# Patient Record
Sex: FEMALE | Race: WHITE | Employment: UNEMPLOYED | URBAN - METROPOLITAN AREA
[De-identification: names, ages, dates, MRNs, and addresses within clinical notes are randomized per-mention and may not be internally consistent; named-entity substitution may affect disease eponyms.]

---

## 2017-07-06 ENCOUNTER — OFFICE VISIT (OUTPATIENT)
Dept: INTERNAL MEDICINE CLINIC | Age: 37
End: 2017-07-06

## 2017-07-06 VITALS
DIASTOLIC BLOOD PRESSURE: 68 MMHG | OXYGEN SATURATION: 98 % | SYSTOLIC BLOOD PRESSURE: 96 MMHG | RESPIRATION RATE: 16 BRPM | HEART RATE: 86 BPM | WEIGHT: 179 LBS | HEIGHT: 65 IN | BODY MASS INDEX: 29.82 KG/M2

## 2017-07-06 DIAGNOSIS — E55.9 VITAMIN D DEFICIENCY: ICD-10-CM

## 2017-07-06 DIAGNOSIS — K58.9 IRRITABLE BOWEL SYNDROME, UNSPECIFIED TYPE: ICD-10-CM

## 2017-07-06 DIAGNOSIS — F31.9 BIPOLAR AFFECTIVE DISORDER, REMISSION STATUS UNSPECIFIED (HCC): ICD-10-CM

## 2017-07-06 DIAGNOSIS — D06.9 ADENOCARCINOMA IN SITU (AIS) OF UTERINE CERVIX: Primary | ICD-10-CM

## 2017-07-06 DIAGNOSIS — M51.36 DDD (DEGENERATIVE DISC DISEASE), LUMBAR: ICD-10-CM

## 2017-07-06 DIAGNOSIS — R11.2 NAUSEA AND VOMITING, INTRACTABILITY OF VOMITING NOT SPECIFIED, UNSPECIFIED VOMITING TYPE: ICD-10-CM

## 2017-07-06 PROCEDURE — 99204 OFFICE O/P NEW MOD 45 MIN: CPT | Performed by: INTERNAL MEDICINE

## 2017-07-06 RX ORDER — ALBUTEROL SULFATE 2.5 MG/3ML
SOLUTION RESPIRATORY (INHALATION)
COMMUNITY
Start: 2016-08-01 | End: 2018-06-04 | Stop reason: SDUPTHER

## 2017-07-06 RX ORDER — OMEPRAZOLE 40 MG/1
40 CAPSULE, DELAYED RELEASE ORAL DAILY
Qty: 30 CAPSULE | Refills: 3 | Status: SHIPPED | OUTPATIENT
Start: 2017-07-06 | End: 2017-09-11 | Stop reason: ALTCHOICE

## 2017-07-06 ASSESSMENT — ENCOUNTER SYMPTOMS
NAUSEA: 1
SHORTNESS OF BREATH: 0
CONSTIPATION: 1
VOMITING: 1
BLOOD IN STOOL: 0
DIARRHEA: 1
COUGH: 0

## 2017-07-12 ENCOUNTER — TELEPHONE (OUTPATIENT)
Dept: GYNECOLOGY | Age: 37
End: 2017-07-12

## 2017-08-03 ENCOUNTER — TELEPHONE (OUTPATIENT)
Dept: INTERNAL MEDICINE CLINIC | Age: 37
End: 2017-08-03

## 2017-08-03 DIAGNOSIS — R51.9 CHRONIC NONINTRACTABLE HEADACHE, UNSPECIFIED HEADACHE TYPE: Primary | ICD-10-CM

## 2017-08-03 DIAGNOSIS — G89.29 CHRONIC NONINTRACTABLE HEADACHE, UNSPECIFIED HEADACHE TYPE: Primary | ICD-10-CM

## 2017-09-11 ENCOUNTER — OFFICE VISIT (OUTPATIENT)
Dept: INTERNAL MEDICINE CLINIC | Age: 37
End: 2017-09-11

## 2017-09-11 ENCOUNTER — TELEPHONE (OUTPATIENT)
Dept: INTERNAL MEDICINE CLINIC | Age: 37
End: 2017-09-11

## 2017-09-11 VITALS
HEIGHT: 65 IN | HEART RATE: 68 BPM | RESPIRATION RATE: 16 BRPM | SYSTOLIC BLOOD PRESSURE: 106 MMHG | DIASTOLIC BLOOD PRESSURE: 60 MMHG | WEIGHT: 183.6 LBS | BODY MASS INDEX: 30.59 KG/M2

## 2017-09-11 DIAGNOSIS — D06.9 ADENOCARCINOMA IN SITU (AIS) OF UTERINE CERVIX: ICD-10-CM

## 2017-09-11 DIAGNOSIS — R11.2 NON-INTRACTABLE VOMITING WITH NAUSEA, UNSPECIFIED VOMITING TYPE: Primary | ICD-10-CM

## 2017-09-11 DIAGNOSIS — F31.9 BIPOLAR AFFECTIVE DISORDER, REMISSION STATUS UNSPECIFIED (HCC): ICD-10-CM

## 2017-09-11 PROCEDURE — 99214 OFFICE O/P EST MOD 30 MIN: CPT | Performed by: INTERNAL MEDICINE

## 2017-09-11 RX ORDER — PANTOPRAZOLE SODIUM 40 MG/1
TABLET, DELAYED RELEASE ORAL
COMMUNITY
Start: 2017-09-06 | End: 2019-09-16

## 2017-09-11 ASSESSMENT — ENCOUNTER SYMPTOMS
NAUSEA: 1
BLOOD IN STOOL: 0
CONSTIPATION: 1
DIARRHEA: 1
VOMITING: 1
COUGH: 0
SHORTNESS OF BREATH: 0

## 2017-09-13 ENCOUNTER — NURSE ONLY (OUTPATIENT)
Dept: INTERNAL MEDICINE CLINIC | Age: 37
End: 2017-09-13

## 2017-09-13 PROCEDURE — 90471 IMMUNIZATION ADMIN: CPT | Performed by: INTERNAL MEDICINE

## 2017-09-13 PROCEDURE — 90688 IIV4 VACCINE SPLT 0.5 ML IM: CPT | Performed by: INTERNAL MEDICINE

## 2017-09-14 ENCOUNTER — HOSPITAL ENCOUNTER (OUTPATIENT)
Dept: MRI IMAGING | Age: 37
Discharge: HOME | End: 2017-09-14
Admitting: NEUROLOGICAL SURGERY

## 2017-09-14 DIAGNOSIS — M51.26 OTHER INTERVERTEBRAL DISC DISPLACEMENT, LUMBAR REGION: ICD-10-CM

## 2017-09-25 ENCOUNTER — TELEPHONE (OUTPATIENT)
Dept: INTERNAL MEDICINE CLINIC | Age: 37
End: 2017-09-25

## 2017-10-18 ENCOUNTER — HOSPITAL ENCOUNTER (OUTPATIENT)
Dept: MRI IMAGING | Age: 37
Discharge: HOME | End: 2017-10-18
Attending: NEUROLOGICAL SURGERY | Admitting: NEUROLOGICAL SURGERY

## 2017-10-18 DIAGNOSIS — M54.12 CERVICAL RADICULOPATHY: ICD-10-CM

## 2017-12-01 ENCOUNTER — INITIAL CONSULT (OUTPATIENT)
Dept: PSYCHIATRY | Age: 37
End: 2017-12-01

## 2017-12-01 VITALS
RESPIRATION RATE: 16 BRPM | HEART RATE: 68 BPM | WEIGHT: 184 LBS | SYSTOLIC BLOOD PRESSURE: 98 MMHG | BODY MASS INDEX: 30.66 KG/M2 | DIASTOLIC BLOOD PRESSURE: 56 MMHG | HEIGHT: 65 IN

## 2017-12-01 DIAGNOSIS — F60.3 BORDERLINE PERSONALITY DISORDER (HCC): Primary | ICD-10-CM

## 2017-12-01 DIAGNOSIS — F43.10 PTSD (POST-TRAUMATIC STRESS DISORDER): ICD-10-CM

## 2017-12-01 DIAGNOSIS — Z86.59 HISTORY OF BIPOLAR DISORDER: ICD-10-CM

## 2017-12-01 PROCEDURE — G8417 CALC BMI ABV UP PARAM F/U: HCPCS | Performed by: PSYCHIATRY & NEUROLOGY

## 2017-12-01 PROCEDURE — 99244 OFF/OP CNSLTJ NEW/EST MOD 40: CPT | Performed by: PSYCHIATRY & NEUROLOGY

## 2017-12-01 PROCEDURE — G8484 FLU IMMUNIZE NO ADMIN: HCPCS | Performed by: PSYCHIATRY & NEUROLOGY

## 2017-12-01 PROCEDURE — G8427 DOCREV CUR MEDS BY ELIG CLIN: HCPCS | Performed by: PSYCHIATRY & NEUROLOGY

## 2017-12-01 RX ORDER — CLOMIPRAMINE HYDROCHLORIDE 25 MG/1
CAPSULE ORAL
Qty: 60 CAPSULE | Refills: 2 | Status: SHIPPED | OUTPATIENT
Start: 2017-12-01 | End: 2018-05-18 | Stop reason: SDUPTHER

## 2017-12-01 ASSESSMENT — ENCOUNTER SYMPTOMS
SHORTNESS OF BREATH: 0
NAUSEA: 0
CHEST TIGHTNESS: 0
DIARRHEA: 0
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
## 2017-12-08 ENCOUNTER — OFFICE VISIT (OUTPATIENT)
Dept: PSYCHOLOGY | Age: 37
End: 2017-12-08

## 2017-12-08 DIAGNOSIS — F43.10 PTSD (POST-TRAUMATIC STRESS DISORDER): Primary | ICD-10-CM

## 2017-12-08 DIAGNOSIS — F60.3 BORDERLINE PERSONALITY DISORDER (HCC): ICD-10-CM

## 2017-12-08 PROCEDURE — 90791 PSYCH DIAGNOSTIC EVALUATION: CPT | Performed by: PSYCHOLOGIST

## 2017-12-08 ASSESSMENT — PATIENT HEALTH QUESTIONNAIRE - PHQ9
5. POOR APPETITE OR OVEREATING: 3
8. MOVING OR SPEAKING SO SLOWLY THAT OTHER PEOPLE COULD HAVE NOTICED. OR THE OPPOSITE, BEING SO FIGETY OR RESTLESS THAT YOU HAVE BEEN MOVING AROUND A LOT MORE THAN USUAL: 3
10. IF YOU CHECKED OFF ANY PROBLEMS, HOW DIFFICULT HAVE THESE PROBLEMS MADE IT FOR YOU TO DO YOUR WORK, TAKE CARE OF THINGS AT HOME, OR GET ALONG WITH OTHER PEOPLE: 3
4. FEELING TIRED OR HAVING LITTLE ENERGY: 3
3. TROUBLE FALLING OR STAYING ASLEEP: 3
2. FEELING DOWN, DEPRESSED OR HOPELESS: 3
1. LITTLE INTEREST OR PLEASURE IN DOING THINGS: 3
9. THOUGHTS THAT YOU WOULD BE BETTER OFF DEAD, OR OF HURTING YOURSELF: 2
6. FEELING BAD ABOUT YOURSELF - OR THAT YOU ARE A FAILURE OR HAVE LET YOURSELF OR YOUR FAMILY DOWN: 3
SUM OF ALL RESPONSES TO PHQ QUESTIONS 1-9: 26
SUM OF ALL RESPONSES TO PHQ9 QUESTIONS 1 & 2: 6
7. TROUBLE CONCENTRATING ON THINGS, SUCH AS READING THE NEWSPAPER OR WATCHING TELEVISION: 3

## 2017-12-08 ASSESSMENT — ANXIETY QUESTIONNAIRES
5. BEING SO RESTLESS THAT IT IS HARD TO SIT STILL: 3-NEARLY EVERY DAY
GAD7 TOTAL SCORE: 21
6. BECOMING EASILY ANNOYED OR IRRITABLE: 3-NEARLY EVERY DAY
2. NOT BEING ABLE TO STOP OR CONTROL WORRYING: 3-NEARLY EVERY DAY
7. FEELING AFRAID AS IF SOMETHING AWFUL MIGHT HAPPEN: 3-NEARLY EVERY DAY
1. FEELING NERVOUS, ANXIOUS, OR ON EDGE: 3-NEARLY EVERY DAY
3. WORRYING TOO MUCH ABOUT DIFFERENT THINGS: 3-NEARLY EVERY DAY
4. TROUBLE RELAXING: 3-NEARLY EVERY DAY

## 2017-12-08 NOTE — PROGRESS NOTES
tobacco.  ETOH:   reports that she drinks alcohol. Family History:   Family History   Problem Relation Age of Onset    Other Father      cirrhosis    Substance Abuse Father          A:    See S: above. Pt presenting with chronic trauma related anxiety in PTSD spectrum with co-morbid BPD. Pt has solid understanding of her mental health issues, understands the benefit of DBT treatment. Discussed [facility] but that they don't take current insurance. Good news is that her insurance will change in January. Encouraged her to contact [facility] asap even with insurance not changing yet as there could be a wait list for the DBT program. She expressed understanding of this. Will call first thing Monday. Reported daily morbid ideation, but denied any suicidal thoughts to harm herself or others. Contracted for safety. We reviewed emotional safety plan to use if mood status changes at any time. F/u with Dr. Glez Officer on 1/12/18. I'm going to bridge until she is has psychotherapist is in place. F/u with me in 3-4 weeks. PHQ Scores 12/8/2017   PHQ2 Score 6   PHQ9 Score 26     Interpretation of Total Score Depression Severity: 1-4 = Minimal depression, 5-9 = Mild depression, 10-14 = Moderate depression, 15-19 = Moderately severe depression, 20-27 = Severe depression    BONITA 7 SCORE 12/8/2017   BONITA-7 Total Score 21     Interpretation of BONITA-7 score: 5-9 = mild anxiety, 10-14 = moderate anxiety, 15+ = severe anxiety. Recommend referral to behavioral health for scores 10 or greater.     Diagnosis:    PTSD, BPD      Diagnosis Date    Asthma     Back pain     Bilateral ovarian cysts     Bipolar 1 disorder (HCC)     Cancer (HCC)     adenoca in situ cervix    DDD (degenerative disc disease), lumbar     Headache(784.0)     IBS (irritable bowel syndrome)     OCD (obsessive compulsive disorder)     PTSD (post-traumatic stress disorder)     Vertigo      Problems with primary support group, Problems related to the social environment, Occupational problems, Economic problems and Other psychosocial and environmental problems      Plan:  Pt interventions:    Discussed self-care (sleep, nutrition, rewarding activities, social support, exercise), Discussed benefits of referral for specialty care, Provided education on PTSD symptoms and treatment options for evidence-based treatment (Cognitive Processing Therapy and Prolonged Exposure), Discussed potential barriers to change, Established rapport, Conducted functional assessment and Provided Psychoeducation re: DBT treatment. Pt Behavioral Change Plan:    1. Call [phone] for DBT   2. Go to Dr. Judge Womack on  for medication management  3.  Return to clinic for Dr. Greg Salcido in 3-4 weeks

## 2018-05-18 ENCOUNTER — OFFICE VISIT (OUTPATIENT)
Dept: PSYCHIATRY | Age: 38
End: 2018-05-18

## 2018-05-18 VITALS
WEIGHT: 194 LBS | RESPIRATION RATE: 16 BRPM | SYSTOLIC BLOOD PRESSURE: 101 MMHG | HEART RATE: 69 BPM | BODY MASS INDEX: 32.32 KG/M2 | HEIGHT: 65 IN | DIASTOLIC BLOOD PRESSURE: 63 MMHG

## 2018-05-18 DIAGNOSIS — F43.10 PTSD (POST-TRAUMATIC STRESS DISORDER): ICD-10-CM

## 2018-05-18 DIAGNOSIS — Z86.59 HISTORY OF BIPOLAR DISORDER: ICD-10-CM

## 2018-05-18 DIAGNOSIS — F60.3 BORDERLINE PERSONALITY DISORDER (HCC): Primary | ICD-10-CM

## 2018-05-18 PROCEDURE — G8427 DOCREV CUR MEDS BY ELIG CLIN: HCPCS | Performed by: PSYCHIATRY & NEUROLOGY

## 2018-05-18 PROCEDURE — G8417 CALC BMI ABV UP PARAM F/U: HCPCS | Performed by: PSYCHIATRY & NEUROLOGY

## 2018-05-18 PROCEDURE — 99214 OFFICE O/P EST MOD 30 MIN: CPT | Performed by: PSYCHIATRY & NEUROLOGY

## 2018-05-18 PROCEDURE — 1036F TOBACCO NON-USER: CPT | Performed by: PSYCHIATRY & NEUROLOGY

## 2018-05-18 RX ORDER — CLOMIPRAMINE HYDROCHLORIDE 25 MG/1
CAPSULE ORAL
Qty: 60 CAPSULE | Refills: 2 | Status: SHIPPED | OUTPATIENT
Start: 2018-05-18 | End: 2018-07-09 | Stop reason: SDUPTHER

## 2018-05-18 RX ORDER — PRAZOSIN HYDROCHLORIDE 1 MG/1
1 CAPSULE ORAL NIGHTLY
Qty: 30 CAPSULE | Refills: 3 | Status: SHIPPED | OUTPATIENT
Start: 2018-05-18 | End: 2018-07-09 | Stop reason: SDUPTHER

## 2018-05-22 ENCOUNTER — OFFICE VISIT (OUTPATIENT)
Dept: PSYCHOLOGY | Age: 38
End: 2018-05-22

## 2018-05-22 DIAGNOSIS — F43.10 PTSD (POST-TRAUMATIC STRESS DISORDER): Primary | ICD-10-CM

## 2018-05-22 PROCEDURE — 90834 PSYTX W PT 45 MINUTES: CPT | Performed by: PSYCHOLOGIST

## 2018-05-22 ASSESSMENT — PATIENT HEALTH QUESTIONNAIRE - PHQ9
7. TROUBLE CONCENTRATING ON THINGS, SUCH AS READING THE NEWSPAPER OR WATCHING TELEVISION: 3
2. FEELING DOWN, DEPRESSED OR HOPELESS: 3
5. POOR APPETITE OR OVEREATING: 3
SUM OF ALL RESPONSES TO PHQ9 QUESTIONS 1 & 2: 6
8. MOVING OR SPEAKING SO SLOWLY THAT OTHER PEOPLE COULD HAVE NOTICED. OR THE OPPOSITE, BEING SO FIGETY OR RESTLESS THAT YOU HAVE BEEN MOVING AROUND A LOT MORE THAN USUAL: 3
3. TROUBLE FALLING OR STAYING ASLEEP: 3
6. FEELING BAD ABOUT YOURSELF - OR THAT YOU ARE A FAILURE OR HAVE LET YOURSELF OR YOUR FAMILY DOWN: 3
4. FEELING TIRED OR HAVING LITTLE ENERGY: 3
9. THOUGHTS THAT YOU WOULD BE BETTER OFF DEAD, OR OF HURTING YOURSELF: 3
SUM OF ALL RESPONSES TO PHQ QUESTIONS 1-9: 27
1. LITTLE INTEREST OR PLEASURE IN DOING THINGS: 3
10. IF YOU CHECKED OFF ANY PROBLEMS, HOW DIFFICULT HAVE THESE PROBLEMS MADE IT FOR YOU TO DO YOUR WORK, TAKE CARE OF THINGS AT HOME, OR GET ALONG WITH OTHER PEOPLE: 3

## 2018-05-22 ASSESSMENT — ANXIETY QUESTIONNAIRES
3. WORRYING TOO MUCH ABOUT DIFFERENT THINGS: 3-NEARLY EVERY DAY
7. FEELING AFRAID AS IF SOMETHING AWFUL MIGHT HAPPEN: 3-NEARLY EVERY DAY
1. FEELING NERVOUS, ANXIOUS, OR ON EDGE: 3-NEARLY EVERY DAY
2. NOT BEING ABLE TO STOP OR CONTROL WORRYING: 3-NEARLY EVERY DAY
GAD7 TOTAL SCORE: 21
6. BECOMING EASILY ANNOYED OR IRRITABLE: 3-NEARLY EVERY DAY
5. BEING SO RESTLESS THAT IT IS HARD TO SIT STILL: 3-NEARLY EVERY DAY
4. TROUBLE RELAXING: 3-NEARLY EVERY DAY

## 2018-06-04 ENCOUNTER — OFFICE VISIT (OUTPATIENT)
Dept: INTERNAL MEDICINE CLINIC | Age: 38
End: 2018-06-04

## 2018-06-04 VITALS
SYSTOLIC BLOOD PRESSURE: 108 MMHG | RESPIRATION RATE: 16 BRPM | HEART RATE: 64 BPM | DIASTOLIC BLOOD PRESSURE: 60 MMHG | WEIGHT: 192 LBS | BODY MASS INDEX: 31.99 KG/M2 | HEIGHT: 65 IN

## 2018-06-04 DIAGNOSIS — R11.0 CHRONIC NAUSEA: ICD-10-CM

## 2018-06-04 DIAGNOSIS — J45.40 MODERATE PERSISTENT ASTHMA WITHOUT COMPLICATION: Primary | ICD-10-CM

## 2018-06-04 DIAGNOSIS — R42 VERTIGO: ICD-10-CM

## 2018-06-04 LAB
ANION GAP SERPL CALCULATED.3IONS-SCNC: 14 MMOL/L (ref 3–16)
BUN BLDV-MCNC: 9 MG/DL (ref 7–20)
CALCIUM SERPL-MCNC: 9.3 MG/DL (ref 8.3–10.6)
CHLORIDE BLD-SCNC: 99 MMOL/L (ref 99–110)
CO2: 23 MMOL/L (ref 21–32)
CREAT SERPL-MCNC: 0.6 MG/DL (ref 0.6–1.1)
GFR AFRICAN AMERICAN: >60
GFR NON-AFRICAN AMERICAN: >60
GLUCOSE BLD-MCNC: 89 MG/DL (ref 70–99)
POTASSIUM SERPL-SCNC: 3.9 MMOL/L (ref 3.5–5.1)
SODIUM BLD-SCNC: 136 MMOL/L (ref 136–145)

## 2018-06-04 PROCEDURE — 1036F TOBACCO NON-USER: CPT | Performed by: INTERNAL MEDICINE

## 2018-06-04 PROCEDURE — 99213 OFFICE O/P EST LOW 20 MIN: CPT | Performed by: INTERNAL MEDICINE

## 2018-06-04 PROCEDURE — 36415 COLL VENOUS BLD VENIPUNCTURE: CPT | Performed by: INTERNAL MEDICINE

## 2018-06-04 PROCEDURE — G8417 CALC BMI ABV UP PARAM F/U: HCPCS | Performed by: INTERNAL MEDICINE

## 2018-06-04 PROCEDURE — G8427 DOCREV CUR MEDS BY ELIG CLIN: HCPCS | Performed by: INTERNAL MEDICINE

## 2018-06-04 RX ORDER — ONDANSETRON 4 MG/1
4 TABLET, FILM COATED ORAL EVERY 8 HOURS PRN
Qty: 30 TABLET | Refills: 3 | Status: SHIPPED | OUTPATIENT
Start: 2018-06-04 | End: 2018-12-17 | Stop reason: SDUPTHER

## 2018-06-04 RX ORDER — ALBUTEROL SULFATE 90 UG/1
2 AEROSOL, METERED RESPIRATORY (INHALATION) EVERY 6 HOURS PRN
Qty: 1 INHALER | Refills: 12 | Status: SHIPPED | OUTPATIENT
Start: 2018-06-04 | End: 2019-07-01 | Stop reason: SDUPTHER

## 2018-06-04 RX ORDER — BUDESONIDE AND FORMOTEROL FUMARATE DIHYDRATE 160; 4.5 UG/1; UG/1
2 AEROSOL RESPIRATORY (INHALATION) 2 TIMES DAILY
Qty: 1 INHALER | Refills: 12 | Status: SHIPPED | OUTPATIENT
Start: 2018-06-04 | End: 2019-06-25 | Stop reason: SDUPTHER

## 2018-06-04 RX ORDER — MECLIZINE HCL 12.5 MG/1
12.5 TABLET ORAL 3 TIMES DAILY PRN
Qty: 30 TABLET | Refills: 5 | Status: SHIPPED | OUTPATIENT
Start: 2018-06-04 | End: 2019-05-29 | Stop reason: SDUPTHER

## 2018-06-04 RX ORDER — ALBUTEROL SULFATE 2.5 MG/3ML
SOLUTION RESPIRATORY (INHALATION)
Qty: 120 EACH | Refills: 12 | Status: SHIPPED | OUTPATIENT
Start: 2018-06-04 | End: 2019-06-25 | Stop reason: SDUPTHER

## 2018-06-04 RX ORDER — ONDANSETRON 4 MG/1
4 TABLET, FILM COATED ORAL EVERY 8 HOURS PRN
COMMUNITY
End: 2018-06-04 | Stop reason: SDUPTHER

## 2018-06-04 ASSESSMENT — ENCOUNTER SYMPTOMS
VOMITING: 1
SHORTNESS OF BREATH: 0
COUGH: 0
NAUSEA: 1

## 2018-06-12 DIAGNOSIS — F41.9 ANXIETY: Primary | ICD-10-CM

## 2018-06-12 RX ORDER — DIAZEPAM 5 MG/1
5 TABLET ORAL PRN
Qty: 30 TABLET | Refills: 2 | Status: SHIPPED | OUTPATIENT
Start: 2018-06-12 | End: 2018-07-09 | Stop reason: SDUPTHER

## 2018-06-13 ENCOUNTER — OFFICE VISIT (OUTPATIENT)
Dept: ORTHOPEDIC SURGERY | Age: 38
End: 2018-06-13

## 2018-06-13 VITALS
RESPIRATION RATE: 16 BRPM | SYSTOLIC BLOOD PRESSURE: 117 MMHG | HEART RATE: 96 BPM | HEIGHT: 65 IN | TEMPERATURE: 96.1 F | DIASTOLIC BLOOD PRESSURE: 89 MMHG | BODY MASS INDEX: 31.99 KG/M2 | WEIGHT: 192 LBS

## 2018-06-13 DIAGNOSIS — S93.402A SPRAIN OF LEFT ANKLE, UNSPECIFIED LIGAMENT, INITIAL ENCOUNTER: ICD-10-CM

## 2018-06-13 DIAGNOSIS — S99.912A LEFT ANKLE INJURY, INITIAL ENCOUNTER: Primary | ICD-10-CM

## 2018-06-13 PROCEDURE — 99202 OFFICE O/P NEW SF 15 MIN: CPT | Performed by: PHYSICIAN ASSISTANT

## 2018-06-13 PROCEDURE — G8427 DOCREV CUR MEDS BY ELIG CLIN: HCPCS | Performed by: PHYSICIAN ASSISTANT

## 2018-06-13 PROCEDURE — G8417 CALC BMI ABV UP PARAM F/U: HCPCS | Performed by: PHYSICIAN ASSISTANT

## 2018-06-13 PROCEDURE — 1036F TOBACCO NON-USER: CPT | Performed by: PHYSICIAN ASSISTANT

## 2018-06-13 PROCEDURE — L4360 PNEUMAT WALKING BOOT PRE CST: HCPCS | Performed by: PHYSICIAN ASSISTANT

## 2018-06-14 ENCOUNTER — TELEPHONE (OUTPATIENT)
Dept: ORTHOPEDIC SURGERY | Age: 38
End: 2018-06-14

## 2018-06-14 PROBLEM — S93.402A SPRAIN OF LEFT ANKLE: Status: ACTIVE | Noted: 2018-06-14

## 2018-06-29 ENCOUNTER — OFFICE VISIT (OUTPATIENT)
Dept: PSYCHOLOGY | Age: 38
End: 2018-06-29

## 2018-06-29 DIAGNOSIS — F43.10 PTSD (POST-TRAUMATIC STRESS DISORDER): Primary | ICD-10-CM

## 2018-06-29 PROCEDURE — 90832 PSYTX W PT 30 MINUTES: CPT | Performed by: PSYCHOLOGIST

## 2018-06-29 ASSESSMENT — PATIENT HEALTH QUESTIONNAIRE - PHQ9
SUM OF ALL RESPONSES TO PHQ QUESTIONS 1-9: 27
9. THOUGHTS THAT YOU WOULD BE BETTER OFF DEAD, OR OF HURTING YOURSELF: 3
3. TROUBLE FALLING OR STAYING ASLEEP: 3
8. MOVING OR SPEAKING SO SLOWLY THAT OTHER PEOPLE COULD HAVE NOTICED. OR THE OPPOSITE, BEING SO FIGETY OR RESTLESS THAT YOU HAVE BEEN MOVING AROUND A LOT MORE THAN USUAL: 3
4. FEELING TIRED OR HAVING LITTLE ENERGY: 3
1. LITTLE INTEREST OR PLEASURE IN DOING THINGS: 3
6. FEELING BAD ABOUT YOURSELF - OR THAT YOU ARE A FAILURE OR HAVE LET YOURSELF OR YOUR FAMILY DOWN: 3
7. TROUBLE CONCENTRATING ON THINGS, SUCH AS READING THE NEWSPAPER OR WATCHING TELEVISION: 3
5. POOR APPETITE OR OVEREATING: 3
SUM OF ALL RESPONSES TO PHQ9 QUESTIONS 1 & 2: 6
2. FEELING DOWN, DEPRESSED OR HOPELESS: 3
10. IF YOU CHECKED OFF ANY PROBLEMS, HOW DIFFICULT HAVE THESE PROBLEMS MADE IT FOR YOU TO DO YOUR WORK, TAKE CARE OF THINGS AT HOME, OR GET ALONG WITH OTHER PEOPLE: 3

## 2018-06-29 ASSESSMENT — ANXIETY QUESTIONNAIRES
5. BEING SO RESTLESS THAT IT IS HARD TO SIT STILL: 3-NEARLY EVERY DAY
4. TROUBLE RELAXING: 3-NEARLY EVERY DAY
6. BECOMING EASILY ANNOYED OR IRRITABLE: 3-NEARLY EVERY DAY
1. FEELING NERVOUS, ANXIOUS, OR ON EDGE: 3-NEARLY EVERY DAY
GAD7 TOTAL SCORE: 21
7. FEELING AFRAID AS IF SOMETHING AWFUL MIGHT HAPPEN: 3-NEARLY EVERY DAY
3. WORRYING TOO MUCH ABOUT DIFFERENT THINGS: 3-NEARLY EVERY DAY
2. NOT BEING ABLE TO STOP OR CONTROL WORRYING: 3-NEARLY EVERY DAY

## 2018-07-03 ENCOUNTER — OFFICE VISIT (OUTPATIENT)
Dept: ORTHOPEDIC SURGERY | Age: 38
End: 2018-07-03

## 2018-07-03 VITALS
WEIGHT: 192 LBS | HEIGHT: 65 IN | HEART RATE: 96 BPM | BODY MASS INDEX: 31.99 KG/M2 | DIASTOLIC BLOOD PRESSURE: 79 MMHG | SYSTOLIC BLOOD PRESSURE: 116 MMHG | TEMPERATURE: 98.2 F

## 2018-07-03 DIAGNOSIS — S93.402A SPRAIN OF LEFT ANKLE, UNSPECIFIED LIGAMENT, INITIAL ENCOUNTER: Primary | ICD-10-CM

## 2018-07-03 PROCEDURE — 1036F TOBACCO NON-USER: CPT | Performed by: PHYSICIAN ASSISTANT

## 2018-07-03 PROCEDURE — 99212 OFFICE O/P EST SF 10 MIN: CPT | Performed by: PHYSICIAN ASSISTANT

## 2018-07-03 PROCEDURE — G8427 DOCREV CUR MEDS BY ELIG CLIN: HCPCS | Performed by: PHYSICIAN ASSISTANT

## 2018-07-03 PROCEDURE — G8417 CALC BMI ABV UP PARAM F/U: HCPCS | Performed by: PHYSICIAN ASSISTANT

## 2018-07-09 ENCOUNTER — TELEPHONE (OUTPATIENT)
Dept: INTERNAL MEDICINE CLINIC | Age: 38
End: 2018-07-09

## 2018-07-09 ENCOUNTER — OFFICE VISIT (OUTPATIENT)
Dept: PSYCHIATRY | Age: 38
End: 2018-07-09

## 2018-07-09 VITALS
HEART RATE: 117 BPM | OXYGEN SATURATION: 98 % | BODY MASS INDEX: 29.57 KG/M2 | DIASTOLIC BLOOD PRESSURE: 82 MMHG | HEIGHT: 66 IN | SYSTOLIC BLOOD PRESSURE: 128 MMHG | WEIGHT: 184 LBS

## 2018-07-09 DIAGNOSIS — F60.3 BORDERLINE PERSONALITY DISORDER (HCC): Primary | ICD-10-CM

## 2018-07-09 DIAGNOSIS — F43.10 PTSD (POST-TRAUMATIC STRESS DISORDER): ICD-10-CM

## 2018-07-09 DIAGNOSIS — F41.9 ANXIETY: ICD-10-CM

## 2018-07-09 PROCEDURE — 1036F TOBACCO NON-USER: CPT | Performed by: PSYCHIATRY & NEUROLOGY

## 2018-07-09 PROCEDURE — G8417 CALC BMI ABV UP PARAM F/U: HCPCS | Performed by: PSYCHIATRY & NEUROLOGY

## 2018-07-09 PROCEDURE — G8427 DOCREV CUR MEDS BY ELIG CLIN: HCPCS | Performed by: PSYCHIATRY & NEUROLOGY

## 2018-07-09 PROCEDURE — 99214 OFFICE O/P EST MOD 30 MIN: CPT | Performed by: PSYCHIATRY & NEUROLOGY

## 2018-07-09 RX ORDER — DIAZEPAM 5 MG/1
5 TABLET ORAL PRN
Qty: 30 TABLET | Refills: 2 | Status: SHIPPED | OUTPATIENT
Start: 2018-07-09 | End: 2018-07-09 | Stop reason: SDUPTHER

## 2018-07-09 RX ORDER — PRAZOSIN HYDROCHLORIDE 2 MG/1
2 CAPSULE ORAL NIGHTLY
Qty: 30 CAPSULE | Refills: 1 | Status: SHIPPED | OUTPATIENT
Start: 2018-07-09 | End: 2018-11-05 | Stop reason: SDUPTHER

## 2018-07-09 RX ORDER — PRAZOSIN HYDROCHLORIDE 1 MG/1
1 CAPSULE ORAL NIGHTLY
Qty: 30 CAPSULE | Refills: 3 | Status: SHIPPED | OUTPATIENT
Start: 2018-07-09 | End: 2018-07-09 | Stop reason: SDUPTHER

## 2018-07-09 RX ORDER — DIAZEPAM 5 MG/1
5 TABLET ORAL DAILY PRN
Qty: 30 TABLET | Refills: 2 | Status: SHIPPED | OUTPATIENT
Start: 2018-07-09 | End: 2018-11-05 | Stop reason: SDUPTHER

## 2018-07-09 RX ORDER — CLOMIPRAMINE HYDROCHLORIDE 25 MG/1
CAPSULE ORAL
Qty: 60 CAPSULE | Refills: 2 | Status: SHIPPED | OUTPATIENT
Start: 2018-07-09 | End: 2018-09-21

## 2018-07-27 ENCOUNTER — OFFICE VISIT (OUTPATIENT)
Dept: PSYCHOLOGY | Age: 38
End: 2018-07-27

## 2018-07-27 DIAGNOSIS — F43.10 PTSD (POST-TRAUMATIC STRESS DISORDER): Primary | ICD-10-CM

## 2018-07-27 PROCEDURE — 90832 PSYTX W PT 30 MINUTES: CPT | Performed by: PSYCHOLOGIST

## 2018-07-27 ASSESSMENT — PATIENT HEALTH QUESTIONNAIRE - PHQ9
3. TROUBLE FALLING OR STAYING ASLEEP: 2
7. TROUBLE CONCENTRATING ON THINGS, SUCH AS READING THE NEWSPAPER OR WATCHING TELEVISION: 3
8. MOVING OR SPEAKING SO SLOWLY THAT OTHER PEOPLE COULD HAVE NOTICED. OR THE OPPOSITE, BEING SO FIGETY OR RESTLESS THAT YOU HAVE BEEN MOVING AROUND A LOT MORE THAN USUAL: 3
5. POOR APPETITE OR OVEREATING: 3
10. IF YOU CHECKED OFF ANY PROBLEMS, HOW DIFFICULT HAVE THESE PROBLEMS MADE IT FOR YOU TO DO YOUR WORK, TAKE CARE OF THINGS AT HOME, OR GET ALONG WITH OTHER PEOPLE: 3
6. FEELING BAD ABOUT YOURSELF - OR THAT YOU ARE A FAILURE OR HAVE LET YOURSELF OR YOUR FAMILY DOWN: 3
4. FEELING TIRED OR HAVING LITTLE ENERGY: 3
SUM OF ALL RESPONSES TO PHQ QUESTIONS 1-9: 23
2. FEELING DOWN, DEPRESSED OR HOPELESS: 2
9. THOUGHTS THAT YOU WOULD BE BETTER OFF DEAD, OR OF HURTING YOURSELF: 2
SUM OF ALL RESPONSES TO PHQ9 QUESTIONS 1 & 2: 4
1. LITTLE INTEREST OR PLEASURE IN DOING THINGS: 2

## 2018-07-27 ASSESSMENT — ANXIETY QUESTIONNAIRES
4. TROUBLE RELAXING: 3-NEARLY EVERY DAY
5. BEING SO RESTLESS THAT IT IS HARD TO SIT STILL: 3-NEARLY EVERY DAY
2. NOT BEING ABLE TO STOP OR CONTROL WORRYING: 3-NEARLY EVERY DAY
1. FEELING NERVOUS, ANXIOUS, OR ON EDGE: 3-NEARLY EVERY DAY
GAD7 TOTAL SCORE: 21
7. FEELING AFRAID AS IF SOMETHING AWFUL MIGHT HAPPEN: 3-NEARLY EVERY DAY
3. WORRYING TOO MUCH ABOUT DIFFERENT THINGS: 3-NEARLY EVERY DAY
6. BECOMING EASILY ANNOYED OR IRRITABLE: 3-NEARLY EVERY DAY

## 2018-07-27 NOTE — PROGRESS NOTES
Behavioral Health Consultation Follow-up  Rafa Vigil PsyD  Psychologist  7/27/2018  3:02 PM      Time spent with Patient: 30 minutes  This is patient's fourth  [facility] appointment. Reason for Consult:  PTSD  Referring Provider: Daniel Elizalde MD  [id number]  [address]  [facility] [place] [address]    Feedback given to PCP. S:    Last appt: 6/29. Stopped temporarily with Sameul Human at [facility] due to parking, [place] office. Was approved disability!!! Got the letter on 7/20. Interview on 8/2 to determine compensation. More motivational interviewing interventions to move pt towards engaging in longer term case management and therapy services. O:  MSE:    Appearance    Very happy about SSDI news!, alert, cooperative  Appetite abnormal: poor  Sleep disturbance a bit better  Fatigue Yes  Loss of pleasure Yes  Impulsive behavior No  Speech    normal rate, normal volume and well articulated  Mood    Anxious  Depressed  Stress levels down a bit  Affect    Congruent with full range  Thought Content    intact  Thought Process    linear, goal directed and coherent  Associations    logical connections  Insight    Good  Judgment    Intact  Orientation    oriented to person, place, time, and general circumstances  Memory    recent and remote memory intact  Attention/Concentration    intact  Morbid ideation No  Suicide Assessment    no suicidal ideation      History:    Medications:   Current Outpatient Prescriptions   Medication Sig Dispense Refill    clomiPRAMINE (ANAFRANIL) 25 MG capsule 1 cap nightly for 1 week, then 2 caps nightly. 60 capsule 2    prazosin (MINIPRESS) 2 MG capsule Take 1 capsule by mouth nightly 30 capsule 1    diazepam (VALIUM) 5 MG tablet Take 1 tablet by mouth daily as needed for Anxiety for up to 30 days. . 30 tablet 2    albuterol sulfate  (90 Base) MCG/ACT inhaler Inhale 2 puffs into the lungs every 6 hours as needed for Wheezing 1 Inhaler 12    albuterol (PROVENTIL) (2.5 MG/3ML) 0.083% nebulizer solution INHALE ONE VIAL VIA NEBULIZER BY MOUTH EVERY 6 HOURS AS NEEDED FOR SHORTNESS OF BREATH OR FOR WHEEZING 120 each 12    budesonide-formoterol (SYMBICORT) 160-4.5 MCG/ACT AERO Inhale 2 puffs into the lungs 2 times daily 1 Inhaler 12    meclizine (ANTIVERT) 12.5 MG tablet Take 1 tablet by mouth 3 times daily as needed for Dizziness 30 tablet 5    ondansetron (ZOFRAN) 4 MG tablet Take 1 tablet by mouth every 8 hours as needed for Nausea or Vomiting 30 tablet 3    ibuprofen (ADVIL;MOTRIN) 600 MG tablet Take 1 tablet by mouth every 6 hours as needed for Pain 20 tablet 0    acetaminophen (APAP EXTRA STRENGTH) 500 MG tablet Take 2 tablets by mouth every 6 hours as needed for Pain DO NOT TAKE WITH OTHER MEDICATIONS CONTAINING ACETAMINOPHEN. 30 tablet 0    pantoprazole (PROTONIX) 40 MG tablet       baclofen (LIORESAL) 10 MG tablet prn      ferrous sulfate 325 (65 FE) MG EC tablet Take 325 mg by mouth 3 times daily (with meals)      VITAMIN D, ERGOCALCIFEROL, PO Take by mouth once a week      SUMAtriptan (IMITREX) 50 MG tablet Take 50 mg by mouth once as needed for Migraine      montelukast (SINGULAIR) 10 MG tablet Take 10 mg by mouth nightly       No current facility-administered medications for this visit. Social History:   Social History     Social History    Marital status: Single     Spouse name: N/A    Number of children: N/A    Years of education: N/A     Occupational History    Not on file. Social History Main Topics    Smoking status: Never Smoker    Smokeless tobacco: Never Used    Alcohol use Yes      Comment: occasional    Drug use: No    Sexual activity: Yes     Partners: Male     Other Topics Concern    Not on file     Social History Narrative    No narrative on file       TOBACCO:   reports that she has never smoked. She has never used smokeless tobacco.  ETOH:   reports that she drinks alcohol.     Family History:   Family History   Problem

## 2018-07-30 ENCOUNTER — OFFICE VISIT (OUTPATIENT)
Dept: ORTHOPEDIC SURGERY | Age: 38
End: 2018-07-30

## 2018-07-30 VITALS
HEART RATE: 107 BPM | DIASTOLIC BLOOD PRESSURE: 77 MMHG | TEMPERATURE: 97.2 F | SYSTOLIC BLOOD PRESSURE: 109 MMHG | HEIGHT: 66 IN | WEIGHT: 184 LBS | RESPIRATION RATE: 16 BRPM | BODY MASS INDEX: 29.57 KG/M2

## 2018-07-30 DIAGNOSIS — S93.402A SPRAIN OF LEFT ANKLE, UNSPECIFIED LIGAMENT, INITIAL ENCOUNTER: Primary | ICD-10-CM

## 2018-07-30 PROCEDURE — 99212 OFFICE O/P EST SF 10 MIN: CPT | Performed by: PHYSICIAN ASSISTANT

## 2018-07-30 PROCEDURE — G8427 DOCREV CUR MEDS BY ELIG CLIN: HCPCS | Performed by: PHYSICIAN ASSISTANT

## 2018-07-30 PROCEDURE — G8417 CALC BMI ABV UP PARAM F/U: HCPCS | Performed by: PHYSICIAN ASSISTANT

## 2018-07-30 PROCEDURE — 1036F TOBACCO NON-USER: CPT | Performed by: PHYSICIAN ASSISTANT

## 2018-08-01 NOTE — PROGRESS NOTES
PROGRESS NOTE  [facility] HOSPITALIST SERVICE    Patient: Supriya Sewell Today's Date: 10/22/2017   YOB: 1932 Date of Admission: 10/20/2017   MR Number: [id number] Attending Physician: Sid Lehman MD. FACP.      Code Status:  Full Resuscitation    Hospital Day: 3    Primary Care Provider: Non- Pcp  Consulting Physician(s): Neurology with Dr. Lieberman.   Transfusion:  None   Procedures:  None    Active Issues and Reason for Visit:  Active Problems:    CVA (cerebral vascular accident) (CMS/[facility])    Acute UTI      Assessment and Plan:  Problem list as noted above.  · Recurrent acute, Left Thalamus involved. Cont Stroke protocol. Aspirin and Plavix discontinued, He is started on Aggrenox. Neurological symptoms are improving gradually.   · Continue physical therapy and occupational therapy evaluation and treatment. Patient is gradually improving, cont PT. Not ready to be discharged.  · Acute UTI, IV abx started.  · DVT prophylaxis with Lovenox, DESTINY hose and SCDs.    Disposition:  Anticipate discharge tomorrow to Nursing home for rehab if she is clinically stable.     All orders have been entered electronically through [employer].  Patient seen during multidisciplinary rounds with RN.  Care plan communicated with patient and staff.    Subjective:  Supriya Sewell is a 85 year old year old female who is being seen in follow up for <principal problem not specified>. Patient is sitting up in the recliner, Speech therapist was present in the meeting. Patient denies fever, chills, n/v/sob.     Past Medical/Surgical/Social/Family Histories reviewed as recorded in the admit H&P (dated 10/20/2017).  Meds and Allergies reviewed as recorded in EPIC.    Scheduled meds and infusions:  • cefTRIAXone (ROCEPHIN) IVPB  1,000 mg Intravenous Daily   • vitamin - therapeutic multivitamins w/minerals  1 tablet Oral Daily   • aspirin-dipyridamole  1 capsule Oral 2 times per day   • sodium chloride (PF)  2 mL Injection 2 times  6 hours as needed for Wheezing 1 Inhaler 12    albuterol (PROVENTIL) (2.5 MG/3ML) 0.083% nebulizer solution INHALE ONE VIAL VIA NEBULIZER BY MOUTH EVERY 6 HOURS AS NEEDED FOR SHORTNESS OF BREATH OR FOR WHEEZING 120 each 12    budesonide-formoterol (SYMBICORT) 160-4.5 MCG/ACT AERO Inhale 2 puffs into the lungs 2 times daily 1 Inhaler 12    meclizine (ANTIVERT) 12.5 MG tablet Take 1 tablet by mouth 3 times daily as needed for Dizziness 30 tablet 5    ondansetron (ZOFRAN) 4 MG tablet Take 1 tablet by mouth every 8 hours as needed for Nausea or Vomiting 30 tablet 3    ibuprofen (ADVIL;MOTRIN) 600 MG tablet Take 1 tablet by mouth every 6 hours as needed for Pain 20 tablet 0    acetaminophen (APAP EXTRA STRENGTH) 500 MG tablet Take 2 tablets by mouth every 6 hours as needed for Pain DO NOT TAKE WITH OTHER MEDICATIONS CONTAINING ACETAMINOPHEN. 30 tablet 0    pantoprazole (PROTONIX) 40 MG tablet       baclofen (LIORESAL) 10 MG tablet prn      ferrous sulfate 325 (65 FE) MG EC tablet Take 325 mg by mouth 3 times daily (with meals)      VITAMIN D, ERGOCALCIFEROL, PO Take by mouth once a week      SUMAtriptan (IMITREX) 50 MG tablet Take 50 mg by mouth once as needed for Migraine      montelukast (SINGULAIR) 10 MG tablet Take 10 mg by mouth nightly       No current facility-administered medications for this visit. Objective:     She is alert, oriented x 3, pleasant, well nourished, developed and in no acute distress. /77   Pulse 107   Temp 97.2 °F (36.2 °C) (Temporal)   Resp 16   Ht 5' 6\" (1.676 m)   Wt 184 lb (83.5 kg)   LMP 02/02/2016   BMI 29.70 kg/m²      ANKLE EXAM:  Examination of the left ankle demonstrates: There is no swelling of the ankle. There is  no joint effusion. There is no tenderness of the lateral malleolus. There is no tenderness of the medial malleolus. There is no tenderness of the fifth metatarsal.  There is mild tenderness over the ATFL.    There is no per day   • enoxaparin (LOVENOX) injection  40 mg Subcutaneous Q24H   • atorvastatin  80 mg Oral Nightly          Review of Systems:  Constitutional:  (+)generalized weakness, (-)fever, (-)chills, (-)nights sweats, (-)unintentional weight loss  Eyes:  (-)blurry vision, (-)diplopia, (-)conjunctivitis, (-)discharge   HENT:  (-)trauma, (-)headache, (-)change in hearing or tinnitus, (-)rhinorrhea, (-)bloody nose, (-)sore throat, (-)difficulty swallowing, (-)thrush  Respiratory:  (-)cough, (-)wheezing, (-)shortness of breath, (-)dyspnea on exertion  Cardiovascular:  (-)chest pain, (-)palpitations, (-)PND, (-)orthopnea, (-)lower extremity edema  GI:  (-)abdominal pain, (-)distention, (-)nausea, (-)vomiting, (-)diarrhea, (-)constipation, (-)bloody stool, (-)palpable masses  :  (-)dysuria, (-)hematuria, (-)urinary frequency  Musculoskeletal:  (-)joint swelling, (-)deformity, (-)myalgias, (-)arthralgias, (-)back pain  Integument:  (-)skin rash, (-)sores, (-)open ulcers  Neurologic:  (-)numbness or tingling, (+)asymetric loss of strength or function of extremities, (-)changes in speech, (-)memory changes, (-)balance changes  Endocrine:  (-)polyuria, (-)polydipsia, (-)unintentional weight gain  Lymphatic:  (-)abnormal bruising or bleeding, (-)swollen glands, (-)lymphedema  Psychiatric: (-)changes in mood or affect, (-)anxiety, (-)confusion  Other:  None    OBJECTIVE:  Vital 24 Hour Range Most Recent Value   Temperature Temp  Min: 97.5 °F (36.4 °C)  Max: 98.7 °F (37.1 °C) 98.7 °F (37.1 °C)   Pulse Pulse  Min: 64  Max: 86 86   Respiratory Resp  Min: 8  Max: 18 8   Blood Pressure BP  Min: 114/58  Max: 183/91 114/58   Pulse Oximetry SpO2  Min: 93 %  Max: 95 %    O2 O2 Flow Rate (L/min)  Av L/min  Min: 2 L/min   Min taken time: 10/21/17 1845  Max: 2 L/min   Max taken time: 10/21/17 1845      Vital Most Recent Value First Value   Weight 65.8 kg Weight: 65.9 kg   Height 5' 5\" (165.1 cm) Height: 5' 5\" (165.1 cm)        Intake/Output Summary (Last 24 hours) at 10/22/17 1410  Last data filed at 10/22/17 1256   Gross per 24 hour   Intake              520 ml   Output             1150 ml   Net             -630 ml     Current diet:  Cardiac Diet    Physical Exam:  General - Pt is sitting up in the recliner.  Cor - S1, S2, RRR.  Pulm - clear to auscultate bilaterally, no rales or wheezing.  Abd - positive bowel sound, soft, nontender, not distended.  Ext  -  Warm without clubbing or cyanosis.  Trace ankle edema, patient also have a some varicose veins bilaterally.  Skin - No rashes or lesions. Warm and dry.  No decubitus ulcers.  Neuro - Patient is alert, her speech is better.    Ancillary Studies including laboratory results are reviewed as recorded in EPIC 10/22/2017 2:10 PM.  A subset of labs and results are listed below:  lab last 12 hrs;   Recent Results (from the past 12 hour(s))   Urinalysis & Reflex Micro with Culture if Indicated    Collection Time: 10/22/17  5:10 AM   Result Value Ref Range    COLOR YELLOW YELLOW    APPEARANCE HAZY     GLUCOSE(URINE) NEGATIVE NEGATIVE mg/dL    BILIRUBIN NEGATIVE NEGATIVE    KETONES NEGATIVE NEGATIVE mg/dL    SPECIFIC GRAVITY 1.020 1.005 - 1.030    BLOOD NEGATIVE NEGATIVE    pH 6.0 5.0 - 7.0 Units    PROTEIN(URINE) NEGATIVE NEGATIVE mg/dL    UROBILINOGEN 0.2 0.0 - 1.0 mg/dL    NITRITE POSITIVE (A) NEGATIVE    LEUKOCYTE ESTERASE NEGATIVE NEGATIVE    SPECIMEN TYPE URINE, CLEAN CATCH/MIDSTREAM    Urinalysis Microscopic    Collection Time: 10/22/17  5:10 AM   Result Value Ref Range    Squamous EPI'S 1 to 5 0 - 5 /hpf    RBC 1 to 3 0 - 3 /hpf    WBC 6 to 10 0 - 5 /hpf    BACTERIA LARGE (A) NONE SEEN /hpf    Hyaline Casts 6 to 10 0 - 5 /lpf   CBC & Auto Differential    Collection Time: 10/22/17  6:18 AM   Result Value Ref Range    WBC 7.7 4.2 - 11.0 K/mcL    RBC 4.04 4.00 - 5.20 mil/mcL    HGB 13.1 12.0 - 15.5 g/dL    HCT 39.6 36.0 - 46.5 %    MCV 98.0 78.0 - 100.0 fl    MCH 32.4 26.0 - 34.0 pg     MCHC 33.1 32.0 - 36.5 g/dL    RDW-CV 14.2 11.0 - 15.0 %     140 - 450 K/mcL    DIFF TYPE AUTOMATED DIFFERENTIAL     Neutrophil 83 %    LYMPH 10 %    MONO 6 %    EOSIN 1 %    BASO 0 %    Absolute Neutrophil 6.4 1.8 - 7.7 K/mcL    Absolute Lymph 0.8 (L) 1.0 - 4.0 K/mcL    Absolute Mono 0.4 0.3 - 0.9 K/mcL    Absolute Eos 0.1 0.1 - 0.5 K/mcL    Absolute Baso 0.0 0.0 - 0.3 K/mcL   Basic Metabolic Panel    Collection Time: 10/22/17  6:18 AM   Result Value Ref Range    Sodium 139 135 - 145 mmol/L    Potassium 3.8 3.4 - 5.1 mmol/L    Chloride 104 98 - 107 mmol/L    Carbon Dioxide 24 21 - 32 mmol/L    Anion Gap 15 10 - 20 mmol/L    Glucose 126 (H) 65 - 99 mg/dL    BUN 19 6 - 20 mg/dL    Creatinine 0.71 0.51 - 0.95 mg/dL    GFR Estimate,  >90     GFR Estimate, Non African American 78     BUN/Creatinine Ratio 27 (H) 7 - 25    CALCIUM 8.6 8.4 - 10.2 mg/dL   Magnesium Level    Collection Time: 10/22/17  6:18 AM   Result Value Ref Range    MAGNESIUM 1.8 1.7 - 2.4 mg/dL   Hepatic Function Panel    Collection Time: 10/22/17  6:18 AM   Result Value Ref Range    Albumin 3.1 (L) 3.6 - 5.1 g/dL    TOTAL BILIRUBIN 0.5 0.2 - 1.0 mg/dL    DIRECT BILIRUBIN 0.1 0.0 - 0.2 mg/dL    ALK PHOSPHATASE 65 45 - 117 Units/L    ALT/SGPT 23 <79 Units/L    AST/SGOT 28 <38 Units/L    TOTAL PROTEIN 7.0 6.4 - 8.2 g/dL       Xr Chest Ap Or Pa - Portable    Result Date: 10/20/2017  XR CHEST AP OR PA 10/20/2017 7:21 PM HISTORY:   ams      . COMPARISON: 3/19/2017; 11/20/2014. TECHNIQUE:  Single AP portable view.    FINDINGS:    The lungs are clear. The cardiomediastinal silhouette and pulmonary vasculature appear within normal limits. The bony thorax appears normal.        IMPRESSION:    1.  No evidence of an acute cardiopulmonary process.        Ct Head Level 1    Result Date: 10/20/2017  CT HEAD LEVEL 1 HISTORY: Altered mental status. Comparison: 3/19/2017 FINDINGS: There is stable moderate prominence of the ventricles and sulci,  not atypical for age.There is mild, diffuse periventricular low attenuation. There is no midline shift.  There is no evidence of intracranial hemorrhage.  There are no extra-axial fluid collections. The calvaria are within normal limits.Included portions of the paranasal and mastoid sinuses are clear. CONCLUSION: 1. Stable moderate age-related cortical atrophy and associated microvascular ischemic disease. 2. No acute intracranial process. The results of this examination were called to PETRA OAKES at     on 10/20/2017 6:45 PM.     Ct Angiogram Head And Neck    Result Date: 10/20/2017  CT ANGIOGRAM OF THE HEAD WITH CONTRAST CLINICAL HISTORY: Aphasia. TECHNIQUE: CT angiographic images of the neck and brain were obtained following the bolus intravenous administration of 100 mL of iodinated nonionic Isovue-370 (370mg/mL). Sagital and coronal reformatted images were obtained. Three-dimensional images are reviewed. FINDINGS: Mild atherosclerotic calcification noted at the origins of the great vessels extending off of the thoracic aorta with no evidence of significant stenosis. There is an aberrant right subclavian artery. NECK: Right common carotid artery, internal carotid artery and external carotid artery appear widely patent without evidence of atherosclerosis, stenosis or dissection. The left common carotid artery is patent without evidence of dissection. There is mild atherosclerotic calcification noted within the left carotid bulb extending into the origin of the left internal carotid artery resulting in no more than 10-20% stenosis at the level of the left internal carotid artery origin.. The remaining left internal carotid artery and left external carotid artery are patent throughout their entire distribution. INTRACRANIAL CIRCULATION: Intracranial internal carotid arteries: Mild atherosclerotic calcification. No stenosis. Anterior cerebral arteries: Negative. Middle cerebral arteries: Negative. Posterior  cerebral arteries: Negative. Distal vertebral artery and basilar artery: Negative. Other:        IMPRESSION: 1. Aberrant right subclavian artery. 2. Approximately 10-20% stenosis at the origin of the left internal carotid artery. 3. The right carotid arterial system is widely patent. 4. Mild atherosclerotic calcification of the intracranial internal carotid arteries bilaterally without evidence of stenosis or dissection.     Unresulted Labs     Start     Ordered    10/22/17 0600  Basic Metabolic Panel  AM DRAW,   AMDR      10/21/17 0811    10/22/17 0600  CBC & Auto Differential  AM DRAW,   AMDR      10/21/17 0811    10/22/17 0600  Magnesium Level  AM DRAW,   AMDR      10/21/17 0811    10/22/17 0600  Hepatic Function Panel  AM DRAW,   AMDR      10/21/17 0811    10/21/17 0600  CBC & Auto Differential  AM DRAW,   AMDR      10/20/17 2215    10/20/17 1844  Metered blood glucose  ONE TIME,   STAT      10/20/17 1843    10/20/17 1844  Glycohemoglobin  ONE TIME,   STAT      10/20/17 1843        Unresulted Procedure     Start     Ordered    10/20/17 2216  Echo M-Mode/2D/Doppler (Routine)  1 TIME IMAGING,   STAT      10/20/17 2215    10/20/17 2216  MRI Brain  1 TIME IMAGING,   STAT      10/20/17 2215        Microbiology:  Microbiology Results     None          Sid Lehman MD. FACP.  Hospitalist  [facility]  10/22/2017 2:10 PM

## 2018-08-06 ENCOUNTER — HOSPITAL ENCOUNTER (OUTPATIENT)
Dept: OTHER | Age: 38
Discharge: HOME | End: 2018-08-31
Attending: PHYSICIAN ASSISTANT | Admitting: PHYSICIAN ASSISTANT

## 2018-08-06 NOTE — PROGRESS NOTES
Physical Therapy  Cancellation/No-show Note  Patient Name:  Dave Rosa  :  1980   Date:  2018  Cancelled visits to date: 1  No-shows to date: 0    For today's appointment patient:  [x]  Cancelled - initial evaluation  []  Rescheduled appointment  []  No-show     Reason given by patient:  []  Patient ill  []  Conflicting appointment  [x]  No transportation    []  Conflict with work  []  No reason given  []  Other:     Comments:      Electronically signed by:  Isabel Temple, [id number] [address]

## 2018-08-13 ENCOUNTER — HOSPITAL ENCOUNTER (OUTPATIENT)
Dept: PHYSICAL THERAPY | Age: 38
Discharge: HOME | End: 2018-08-14
Admitting: PHYSICIAN ASSISTANT

## 2018-08-13 NOTE — FLOWSHEET NOTE
Physical Therapy Daily Treatment Note  Date:  2018    Patient Name:  María Schwartz    :  1980  MRN: [id number]    Restrictions/Precautions:    High fall risk - has fallen several times  Pertinent Medical History: Additional Pertinent Hx: asthma, bipolar, cervical CA, DDD, HA, IBS,  OCD, PTSD    Medical/Treatment Diagnosis Information:  · Diagnosis: Left ankle sprain  · Treatment Diagnosis: Decreased functional mobility following left ankle sprain    Insurance/Certification information:  PT Insurance Information: [employer]  Physician Information:  Referring Practitioner: JR Soto  Plan of care signed (Y/N):  Sent     Visit# / total visits:    Pain level: 2-6/10     G-Code (if applicable):  Visit 1  24 walking and moving around Current  CL / Goal  CK       History of Injury:   Subjective: Pt notes that she fell when right leg gave out due to back issues. Has been in the boot since Crystal 3, Currently FWB. Has fallen two times, once when not wearing boot, once when wearing boot. Subjective:   Pain 2-6/10. Feels that right leg and back have contributed to left ankle issues due to history of nuimbness and pain on right side. Currently goes around the house with  gym shoes on, uses boot when outdoors. Can do steps one at a time, using  right to lead. Objective:  Limited left ankle AROM, strength and gait/balance      Exercise/Equipment Resistance/Repetitions Other comments     Left ankle sprain 6/3/18    nusstep add Right side can get numb and weak at times.    GSS incline add         Standing  Mini squats  HR/TR   10  10           Tandem bal  Right fwd  Left fwd  Feet together EO - turn head  Feet together EC     30 sec  30 sec  Add    add    Rocker board  Orange balance disc  bj gumdrop stance    Fitter f/b       Add  F/b s/s  Add cw/ccw  Add    Add as santa    Tband  DF  PF  Pro  Sup   yellow               cryocuff 12 min post                 Other Therapeutic Activities:  Pt

## 2018-08-13 NOTE — PROGRESS NOTES
tenderness to palpation anterior talofib and sinus tarsi  Observation: light eccymosis lateral malleolus,foot and ankle. Edema: minimal left ankle    AROM RLE (degrees)  RLE AROM: WFL  AROM LLE (degrees)  LLE General AROM: df 4a, pf 50,  pronation 20           supination  22    Strength RLE  Strength RLE: WFL  Strength LLE  Comment: df 4-/5 pf 4-/5 supine, inversion 4-/5 eversion 4-/5 lateral discomfort with inversion and eversion     Additional Measures  Flexibility: min limited left GSS flexibility. Girth: lat malleolus right  23.5 cm   left  24 cm. Special Tests: min limited post  glide of talus           Ambulation  Ambulation?: Yes  Ambulation 1  Quality of Gait: Pt demonstrates decreased DF in midstance. , decreased pushoff on left  Pt demonstrates decreased stance time on left  Balance  Standing - Static: Good  Standing - Dynamic: Good;-  Tandem Stance R Leg: 10  Tandem Stance L Le  Single Leg Stance R Le  Single Leg Stance L Leg: 3      Assessment   Conditions Requiring Skilled Therapeutic Intervention  Body structures, Functions, Activity limitations: Decreased functional mobility ; Decreased ROM; Decreased strength;Decreased balance  Assessment: Pt limited post left lateral ankle sprain. Prior function - indep. Current function- wearing boot, antalgic gait, limited adl, activity  Treatment Diagnosis: Decreased functional mobility following left ankle sprain  Prognosis: Good  Decision Making: Medium Complexity  Patient Education: role of PT, HEP  Barriers to Learning: none noted  REQUIRES PT FOLLOW UP: Yes  Treatment Initiated : POC  Activity Tolerance  Activity Tolerance: Patient limited by pain; Patient Tolerated treatment well         Plan   Plan  Times per week: 2x/week for 6 -8 weeks  Current Treatment Recommendations: Strengthening, ROM, Balance Training, Functional Mobility Training, Gait Training, Stair training, Manual Therapy - Soft Tissue Mobilization    G-Code  PT G-Codes  Functional

## 2018-08-20 ENCOUNTER — HOSPITAL ENCOUNTER (OUTPATIENT)
Dept: PHYSICAL THERAPY | Age: 38
Discharge: HOME | End: 2018-08-21
Admitting: PHYSICIAN ASSISTANT

## 2018-08-22 ENCOUNTER — HOSPITAL ENCOUNTER (OUTPATIENT)
Dept: PHYSICAL THERAPY | Age: 38
Discharge: HOME | End: 2018-08-23
Admitting: PHYSICIAN ASSISTANT

## 2018-08-22 NOTE — FLOWSHEET NOTE
Resistance/Repetitions Other comments     Left ankle sprain 6/3/18    nustep 5 min level 0 seat 7 UE 8 Right side can get numb and weak at times. GSS incline 20 sec x 3    Df step on step 10 sec x 3      Standing  Mini squats  HR/TR  Gumdrop rom       10  10  Cw/ccw x 10 each      Tandem bal  Right fwd  Left fwd  Feet together EO - turn head  Feet together EC     30 sec  30 sec  1 min    1 min    Rocker board  Orange balance disc  bj gumdrop stance    Fitter f/b     Add  F/b s/s  Add cw/ccw  x30 sec     Add as santa    Tband  DF  PF  Pro  Sup     Yellow 10x  Yellow 10x  Yellow 10x  Yellow 10x         cryocuff 12 min post          HEP 4 way arom , standing HR/TR, calf stretch towel      Other Therapeutic Activities:  Pt was educated on PT POC, Diagnosis, Prognosis, pathomechanics as well as frequency and duration of scheduling future physical therapy appointments. Time was also taken on this day to answer all patient questions and participation in PT. Reviewed appointment policy in detail with patient and patient verbalized understanding. Home Exercise Program:  Patient instructed in the following for HEP:   Patient verbalized/demonstrated understanding and was issued written HEP. Timed Code Treatment Minutes:  25    Total Treatment Minutes: 35    Treatment/Activity Tolerance:  [] Patient tolerated treatment well [] Patient limited by fatigue  [] Patient limited by pain  [] Patient limited by other medical complications  [] Other:     Prognosis: [x] Good [] Fair  [] Poor    Goals:    Short term goals  Time Frame for Short term goals: 2 weeks  Short term goal 1: Indep with HEP      Long term goals  Time Frame for Long term goals : Discharge  Long term goal 1: decrease pain to 1-2 on VAS to allow improved tolerance for ADL and light homemaking. Long term goal 2: Increase DF to 10 deg to normalize gait mechanics  Long term goal 3:  Increase strength to 4+/5 for normal gait on steps     Patient Requires

## 2018-08-27 ENCOUNTER — HOSPITAL ENCOUNTER (OUTPATIENT)
Dept: PHYSICAL THERAPY | Age: 38
Discharge: HOME | End: 2018-08-28
Admitting: PHYSICIAN ASSISTANT

## 2018-08-27 NOTE — FLOWSHEET NOTE
No    Plan:   [x] Continue per plan of care [] Alter current plan (see comments)  [] Plan of care initiated [] Hold pending MD visit [] Discharge    Plan for Next Session:  Add interactive bal    Electronically signed by:  Erica Diehl, [facility]

## 2018-08-29 ENCOUNTER — HOSPITAL ENCOUNTER (OUTPATIENT)
Dept: PHYSICAL THERAPY | Age: 38
Discharge: HOME | End: 2018-08-30
Admitting: PHYSICIAN ASSISTANT

## 2018-08-31 ENCOUNTER — OFFICE VISIT (OUTPATIENT)
Dept: PSYCHIATRY | Age: 38
End: 2018-08-31

## 2018-08-31 ENCOUNTER — HOSPITAL ENCOUNTER (OUTPATIENT)
Dept: PHYSICAL THERAPY | Age: 38
Discharge: HOME | End: 2018-09-01
Admitting: PHYSICIAN ASSISTANT

## 2018-08-31 VITALS — BODY MASS INDEX: 29.21 KG/M2 | WEIGHT: 181 LBS

## 2018-08-31 DIAGNOSIS — F60.3 BORDERLINE PERSONALITY DISORDER (HCC): Primary | ICD-10-CM

## 2018-08-31 DIAGNOSIS — F41.9 ANXIETY: ICD-10-CM

## 2018-08-31 DIAGNOSIS — F43.10 PTSD (POST-TRAUMATIC STRESS DISORDER): ICD-10-CM

## 2018-08-31 PROCEDURE — G8417 CALC BMI ABV UP PARAM F/U: HCPCS | Performed by: PSYCHIATRY & NEUROLOGY

## 2018-08-31 PROCEDURE — 1036F TOBACCO NON-USER: CPT | Performed by: PSYCHIATRY & NEUROLOGY

## 2018-08-31 PROCEDURE — G8427 DOCREV CUR MEDS BY ELIG CLIN: HCPCS | Performed by: PSYCHIATRY & NEUROLOGY

## 2018-08-31 PROCEDURE — 99214 OFFICE O/P EST MOD 30 MIN: CPT | Performed by: PSYCHIATRY & NEUROLOGY

## 2018-08-31 NOTE — FLOWSHEET NOTE
overall discomfort. ie back aggravation. Objective:  Limited left ankle AROM, strength and gait/balance      Exercise/Equipment Resistance/Repetitions Other comments     Left ankle sprain 6/3/18    nustep 5 min level 2  seat 7 UE 8 Right side can get numb and weak at times. GSS incline 20 sec x 3 Pt to wear boot outside till 9/12. Df step on step 10 sec x 3      Standing  Mini squats  HR/TR  BAPS  Fitter f/b ss    Pilates  2-3 springs     10  10  Level 2  CW / CCW 20  1 thin x 10 each  - inc to 15    Squats x 20 add  Heel raises x 20 add      Rocker board  BOSU  Tandem  Tandem gait  Side steps  reebok      Interactive balance   F/b s/s 1  Min each  1 min stance  R/l 30 sec  10 ft x 4  Add  Side to side       Wii - penguin, tilt table. 8 min      5 min FC walking 5 min on track    cryocuff 15min post          HEP 4 way arom , standing HR/TR, calf stretch towel, tband 4 way      Other Therapeutic Activities:  Pt was educated on PT POC, Diagnosis, Prognosis, pathomechanics as well as frequency and duration of scheduling future physical therapy appointments. Time was also taken on this day to answer all patient questions and participation in PT. Reviewed appointment policy in detail with patient and patient verbalized understanding. Home Exercise Program:  Patient instructed in the following for HEP:   Patient verbalized/demonstrated understanding and was issued written HEP.     Timed Code Treatment Minutes:35    Total Treatment Minutes:  50    Treatment/Activity Tolerance:  [] Patient tolerated treatment well [] Patient limited by fatigue  [] Patient limited by pain  [] Patient limited by other medical complications  [] Other:     Prognosis: [x] Good [] Fair  [] Poor    Goals:    Short term goals  Time Frame for Short term goals: 2 weeks  Short term goal 1: Indep with HEP      Long term goals  Time Frame for Long term goals : Discharge  Long term goal 1: decrease pain to 1-2 on VAS to allow improved

## 2018-08-31 NOTE — PROGRESS NOTES
Psych Follow Up Progress Note    8/31/18  Gregorio Schultz  [id number]    I have reviewed recent documentation:  Gregorio Schultz is a 40 y.o. female  This patient  has a past medical history of Asthma; Back pain; Bilateral ovarian cysts; Bipolar 1 disorder ([facility] [address] Cancer ([facility] [address] DDD (degenerative disc disease), lumbar; Headache(784.0); IBS (irritable bowel syndrome); OCD (obsessive compulsive disorder); PTSD (post-traumatic stress disorder); and Vertigo. Chief Complaint   Patient presents with    Other       Subjective/Interval Hx:  Has been mostly taking clomip 50, but sometimes 25 if she knows she has to get up for something. Really sleeping better. Up and down mood. Objective:  Vitals:    08/31/18 1654   Weight: 181 lb (82.1 kg)        Body mass index is 29.21 kg/m². No results found for this or any previous visit (from the past 168 hour(s)). Current Outpatient Prescriptions   Medication Sig Dispense Refill    clomiPRAMINE (ANAFRANIL) 25 MG capsule 1 cap nightly for 1 week, then 2 caps nightly.  60 capsule 2    prazosin (MINIPRESS) 2 MG capsule Take 1 capsule by mouth nightly 30 capsule 1    albuterol sulfate  (90 Base) MCG/ACT inhaler Inhale 2 puffs into the lungs every 6 hours as needed for Wheezing 1 Inhaler 12    albuterol (PROVENTIL) (2.5 MG/3ML) 0.083% nebulizer solution INHALE ONE VIAL VIA NEBULIZER BY MOUTH EVERY 6 HOURS AS NEEDED FOR SHORTNESS OF BREATH OR FOR WHEEZING 120 each 12    budesonide-formoterol (SYMBICORT) 160-4.5 MCG/ACT AERO Inhale 2 puffs into the lungs 2 times daily 1 Inhaler 12    meclizine (ANTIVERT) 12.5 MG tablet Take 1 tablet by mouth 3 times daily as needed for Dizziness 30 tablet 5    ondansetron (ZOFRAN) 4 MG tablet Take 1 tablet by mouth every 8 hours as needed for Nausea or Vomiting 30 tablet 3    ibuprofen (ADVIL;MOTRIN) 600 MG tablet Take 1 tablet by mouth every 6 hours as needed for Pain 20 tablet 0    acetaminophen (APAP EXTRA STRENGTH) 500 MG tablet
for Nausea or Vomiting 30 tablet 3    ibuprofen (ADVIL;MOTRIN) 600 MG tablet Take 1 tablet by mouth every 6 hours as needed for Pain 20 tablet 0    acetaminophen (APAP EXTRA STRENGTH) 500 MG tablet Take 2 tablets by mouth every 6 hours as needed for Pain DO NOT TAKE WITH OTHER MEDICATIONS CONTAINING ACETAMINOPHEN. 30 tablet 0    pantoprazole (PROTONIX) 40 MG tablet       baclofen (LIORESAL) 10 MG tablet prn      ferrous sulfate 325 (65 FE) MG EC tablet Take 325 mg by mouth 3 times daily (with meals)      VITAMIN D, ERGOCALCIFEROL, PO Take by mouth once a week      SUMAtriptan (IMITREX) 50 MG tablet Take 50 mg by mouth once as needed for Migraine      montelukast (SINGULAIR) 10 MG tablet Take 10 mg by mouth nightly       No current facility-administered medications for this visit. ROS:  No tremor, gait limited by boot. MSE:  A-casually dressed, good EC  A-full  M-goes back and forth between happy and sad quickly. S-grossly A + O  I/J-fair/fair  T-linear, goal-directed.  Speech c nl r/t/v/a.  No S/H I, no A/V H.       1.  BAD, PTSD, OCD by hx, BPD-We'll try clomip 50, prazosin 2 qhs.  Cont valium 5 mg daily prn.  No hx of manic hospitalization, moods up and down over the course of hours to days, for example. Trinh Burkett at [facility].  R/b/a d/w pt including worse mood, depression, suicidality, dyan.   We'll cautiously proceed.

## 2018-09-01 ENCOUNTER — HOSPITAL ENCOUNTER (OUTPATIENT)
Dept: OTHER | Age: 38
Discharge: HOME | End: 2018-09-01
Attending: PHYSICIAN ASSISTANT | Admitting: PHYSICIAN ASSISTANT

## 2018-09-04 RX ORDER — PRAZOSIN HYDROCHLORIDE 2 MG/1
CAPSULE ORAL
Qty: 30 CAPSULE | Refills: 0 | OUTPATIENT
Start: 2018-09-04

## 2018-09-17 ENCOUNTER — PATIENT MESSAGE (OUTPATIENT)
Dept: PSYCHIATRY | Age: 38
End: 2018-09-17

## 2018-09-21 ENCOUNTER — OFFICE VISIT (OUTPATIENT)
Dept: PSYCHIATRY | Age: 38
End: 2018-09-21

## 2018-09-21 VITALS
SYSTOLIC BLOOD PRESSURE: 120 MMHG | WEIGHT: 192 LBS | BODY MASS INDEX: 31.99 KG/M2 | HEART RATE: 89 BPM | DIASTOLIC BLOOD PRESSURE: 83 MMHG | HEIGHT: 65 IN

## 2018-09-21 DIAGNOSIS — F41.9 ANXIETY: ICD-10-CM

## 2018-09-21 DIAGNOSIS — Z86.59 HISTORY OF BIPOLAR DISORDER: ICD-10-CM

## 2018-09-21 DIAGNOSIS — F43.10 PTSD (POST-TRAUMATIC STRESS DISORDER): ICD-10-CM

## 2018-09-21 DIAGNOSIS — F60.3 BORDERLINE PERSONALITY DISORDER (HCC): Primary | ICD-10-CM

## 2018-09-21 PROCEDURE — G8417 CALC BMI ABV UP PARAM F/U: HCPCS | Performed by: PSYCHIATRY & NEUROLOGY

## 2018-09-21 PROCEDURE — G8427 DOCREV CUR MEDS BY ELIG CLIN: HCPCS | Performed by: PSYCHIATRY & NEUROLOGY

## 2018-09-21 PROCEDURE — 1036F TOBACCO NON-USER: CPT | Performed by: PSYCHIATRY & NEUROLOGY

## 2018-09-21 PROCEDURE — 99214 OFFICE O/P EST MOD 30 MIN: CPT | Performed by: PSYCHIATRY & NEUROLOGY

## 2018-09-21 RX ORDER — PAROXETINE 10 MG/1
10 TABLET, FILM COATED ORAL DAILY
Qty: 30 TABLET | Refills: 3 | Status: SHIPPED | OUTPATIENT
Start: 2018-09-21 | End: 2019-01-25

## 2018-09-21 NOTE — PROGRESS NOTES
Psych Follow Up Progress Note    9/21/18  Carleen Lafleur  [id number]    I have reviewed recent documentation:  Carleen Lafleur is a 40 y.o. female  This patient  has a past medical history of Asthma; Back pain; Bilateral ovarian cysts; Bipolar 1 disorder ([facility] [address] Cancer ([facility] [address] DDD (degenerative disc disease), lumbar; Headache(784.0); IBS (irritable bowel syndrome); OCD (obsessive compulsive disorder); PTSD (post-traumatic stress disorder); and Vertigo. Chief Complaint   Patient presents with    Other       Subjective/Interval Hx:  Seemed to get more irritable, almost a bit paranoid? But not psychotic. But really allowing self to externalize anger. Objective:  Vitals:    09/21/18 1405   BP: 120/83   Pulse: 89   Weight: 192 lb (87.1 kg)   Height: 5' 5\" (1.651 m)       Body mass index is 31.95 kg/m². No results found for this or any previous visit (from the past 168 hour(s)). Current Outpatient Prescriptions   Medication Sig Dispense Refill    clomiPRAMINE (ANAFRANIL) 25 MG capsule 1 cap nightly for 1 week, then 2 caps nightly.  60 capsule 2    prazosin (MINIPRESS) 2 MG capsule Take 1 capsule by mouth nightly 30 capsule 1    albuterol sulfate  (90 Base) MCG/ACT inhaler Inhale 2 puffs into the lungs every 6 hours as needed for Wheezing 1 Inhaler 12    albuterol (PROVENTIL) (2.5 MG/3ML) 0.083% nebulizer solution INHALE ONE VIAL VIA NEBULIZER BY MOUTH EVERY 6 HOURS AS NEEDED FOR SHORTNESS OF BREATH OR FOR WHEEZING 120 each 12    budesonide-formoterol (SYMBICORT) 160-4.5 MCG/ACT AERO Inhale 2 puffs into the lungs 2 times daily 1 Inhaler 12    meclizine (ANTIVERT) 12.5 MG tablet Take 1 tablet by mouth 3 times daily as needed for Dizziness 30 tablet 5    ondansetron (ZOFRAN) 4 MG tablet Take 1 tablet by mouth every 8 hours as needed for Nausea or Vomiting 30 tablet 3    ibuprofen (ADVIL;MOTRIN) 600 MG tablet Take 1 tablet by mouth every 6 hours as needed for Pain 20 tablet 0    acetaminophen (APAP

## 2018-09-25 ENCOUNTER — OFFICE VISIT (OUTPATIENT)
Dept: PSYCHOLOGY | Age: 38
End: 2018-09-25
Payer: MEDICARE

## 2018-09-25 DIAGNOSIS — F43.10 PTSD (POST-TRAUMATIC STRESS DISORDER): Primary | ICD-10-CM

## 2018-09-25 PROCEDURE — 90832 PSYTX W PT 30 MINUTES: CPT | Performed by: PSYCHOLOGIST

## 2018-09-25 ASSESSMENT — ANXIETY QUESTIONNAIRES
1. FEELING NERVOUS, ANXIOUS, OR ON EDGE: 3-NEARLY EVERY DAY
3. WORRYING TOO MUCH ABOUT DIFFERENT THINGS: 3-NEARLY EVERY DAY
7. FEELING AFRAID AS IF SOMETHING AWFUL MIGHT HAPPEN: 3-NEARLY EVERY DAY
6. BECOMING EASILY ANNOYED OR IRRITABLE: 3-NEARLY EVERY DAY
GAD7 TOTAL SCORE: 21
4. TROUBLE RELAXING: 3-NEARLY EVERY DAY
2. NOT BEING ABLE TO STOP OR CONTROL WORRYING: 3-NEARLY EVERY DAY
5. BEING SO RESTLESS THAT IT IS HARD TO SIT STILL: 3-NEARLY EVERY DAY

## 2018-09-25 ASSESSMENT — PATIENT HEALTH QUESTIONNAIRE - PHQ9
10. IF YOU CHECKED OFF ANY PROBLEMS, HOW DIFFICULT HAVE THESE PROBLEMS MADE IT FOR YOU TO DO YOUR WORK, TAKE CARE OF THINGS AT HOME, OR GET ALONG WITH OTHER PEOPLE: 3
3. TROUBLE FALLING OR STAYING ASLEEP: 3
8. MOVING OR SPEAKING SO SLOWLY THAT OTHER PEOPLE COULD HAVE NOTICED. OR THE OPPOSITE, BEING SO FIGETY OR RESTLESS THAT YOU HAVE BEEN MOVING AROUND A LOT MORE THAN USUAL: 3
SUM OF ALL RESPONSES TO PHQ QUESTIONS 1-9: 25
SUM OF ALL RESPONSES TO PHQ9 QUESTIONS 1 & 2: 5
9. THOUGHTS THAT YOU WOULD BE BETTER OFF DEAD, OR OF HURTING YOURSELF: 3
5. POOR APPETITE OR OVEREATING: 3
SUM OF ALL RESPONSES TO PHQ QUESTIONS 1-9: 25
1. LITTLE INTEREST OR PLEASURE IN DOING THINGS: 2
2. FEELING DOWN, DEPRESSED OR HOPELESS: 3
6. FEELING BAD ABOUT YOURSELF - OR THAT YOU ARE A FAILURE OR HAVE LET YOURSELF OR YOUR FAMILY DOWN: 3
4. FEELING TIRED OR HAVING LITTLE ENERGY: 2
7. TROUBLE CONCENTRATING ON THINGS, SUCH AS READING THE NEWSPAPER OR WATCHING TELEVISION: 3

## 2018-09-25 NOTE — PATIENT INSTRUCTIONS
1. Go to [facility]:     Main and Administrative Office:  [address], [address]; [address]  [phone] [facility] general information  [phone] [facility] new intake questions (no appointment necessary, Deloris, 8:30 amnoon)  [phone] general information        2. Ask for Ildefonso Lora at the [facility] office for therapy services:     Pari Garcia Office:  [address] [place] [address]; [address]  [phone] [facility] general information  [phone] [facility] Open Access hours     3. Consider down the road [address] for Stress for more specialized treatment: [facility] an [facility] call [phone]     4. Return to clinic for Dr. Fabiana Temple as needed  5. Continue to take break from [address] at [facility] for now  6. Go to Grandmother's 80 th birthday, leave for [facility] this Thursday  7.  Return to clinic for Dr. Jose Hanson in 4weeks

## 2018-09-25 NOTE — PROGRESS NOTES
Behavioral Health Consultation Follow-up  Nic Velez PsyD  Psychologist  2018  1:11 PM      Time spent with Patient: 30 minutes  This is patient's fifth  [facility] appointment. Reason for Consult:  PTSD  Referring Provider: Franki Kebede MD  [id number]  [address]  [facility] [address]    Feedback given to PCP. S:    Last appt: . 1 year anniversary of father's death today, visited [facility] to see father's headstone in September. Wasn't the headstone pt wanted. Woman, Rei Anthony that took care of father will be at court hearing in 2 month for  father's estate. Going to grandmother's 80  birthday outside [facility], leave Thursday and will be gone for about 1 week. Mother will be off for week so visit with her. Predicted if she goes to visit family, it may take the next month to recovery emotionally. With other health problems, pt hasn't been able to follow through with referral to [facility] so we agreed she is welcome to continue to return to me as needed with [facility] consult services. More motivational interviewing interventions to move pt towards engaging in long term psychotherapy services.      O:  MSE:    Appearance    alert, cooperative  Appetite abnormal: poor  Sleep disturbance Yes  Fatigue Yes  Loss of pleasure Yes  Impulsive behavior No  Speech    normal rate, normal volume and well articulated  Mood    Anxious  Depressed  Low self-esteem  Affect    Congruent with full range  Thought Content    intact  Thought Process    linear, goal directed and coherent  Associations    logical connections  Insight    Good  Judgment    Intact  Orientation    oriented to person, place, time, and general circumstances  Memory    recent and remote memory intact  Attention/Concentration    intact  Morbid ideation Yes  Suicide Assessment    no suicidal ideation      History:    Medications:   Current Outpatient Prescriptions   Medication Sig Dispense Refill    PARoxetine (PAXIL) 10 MG tablet Take 1 tablet by mouth daily 30 tablet 3    prazosin (MINIPRESS) 2 MG capsule Take 1 capsule by mouth nightly 30 capsule 1    albuterol sulfate  (90 Base) MCG/ACT inhaler Inhale 2 puffs into the lungs every 6 hours as needed for Wheezing 1 Inhaler 12    albuterol (PROVENTIL) (2.5 MG/3ML) 0.083% nebulizer solution INHALE ONE VIAL VIA NEBULIZER BY MOUTH EVERY 6 HOURS AS NEEDED FOR SHORTNESS OF BREATH OR FOR WHEEZING 120 each 12    budesonide-formoterol (SYMBICORT) 160-4.5 MCG/ACT AERO Inhale 2 puffs into the lungs 2 times daily 1 Inhaler 12    meclizine (ANTIVERT) 12.5 MG tablet Take 1 tablet by mouth 3 times daily as needed for Dizziness 30 tablet 5    ondansetron (ZOFRAN) 4 MG tablet Take 1 tablet by mouth every 8 hours as needed for Nausea or Vomiting 30 tablet 3    ibuprofen (ADVIL;MOTRIN) 600 MG tablet Take 1 tablet by mouth every 6 hours as needed for Pain 20 tablet 0    acetaminophen (APAP EXTRA STRENGTH) 500 MG tablet Take 2 tablets by mouth every 6 hours as needed for Pain DO NOT TAKE WITH OTHER MEDICATIONS CONTAINING ACETAMINOPHEN. 30 tablet 0    pantoprazole (PROTONIX) 40 MG tablet       baclofen (LIORESAL) 10 MG tablet prn      ferrous sulfate 325 (65 FE) MG EC tablet Take 325 mg by mouth 3 times daily (with meals)      VITAMIN D, ERGOCALCIFEROL, PO Take by mouth once a week      SUMAtriptan (IMITREX) 50 MG tablet Take 50 mg by mouth once as needed for Migraine      montelukast (SINGULAIR) 10 MG tablet Take 10 mg by mouth nightly       No current facility-administered medications for this visit. Social History:   Social History     Social History    Marital status: Single     Spouse name: N/A    Number of children: N/A    Years of education: N/A     Occupational History    Not on file.      Social History Main Topics    Smoking status: Never Smoker    Smokeless tobacco: Never Used    Alcohol use Yes      Comment: occasional    Drug use: No    Sexual activity: Yes Partners: Male     Other Topics Concern    Not on file     Social History Narrative    No narrative on file       TOBACCO:   reports that she has never smoked. She has never used smokeless tobacco.  ETOH:   reports that she drinks alcohol. Family History:   Family History   Problem Relation Age of Onset    Other Father         cirrhosis    Substance Abuse Father          A:    See S: above. Reported baseline thoughts of death but denied any thoughts to harm self or others. No plan or intent. Contracted for safety. Will go to ER if mood worsens between medical appts. F/u with Dr. Yeimi Weller on 11/5. F/u with me on 10/23. PHQ Scores 9/25/2018 7/27/2018 6/29/2018 5/22/2018 12/8/2017   PHQ2 Score 5 4 6 6 6   PHQ9 Score 25 23 27 27 26     Interpretation of Total Score Depression Severity: 1-4 = Minimal depression, 5-9 = Mild depression, 10-14 = Moderate depression, 15-19 = Moderately severe depression, 20-27 = Severe depression    BONITA 7 SCORE 9/25/2018 7/27/2018 6/29/2018 5/22/2018 12/8/2017   BONITA-7 Total Score 21 21 21 21 21     Interpretation of BONITA-7 score: 5-9 = mild anxiety, 10-14 = moderate anxiety, 15+ = severe anxiety. Recommend referral to behavioral health for scores 10 or greater.     Diagnosis:    PTSD      Diagnosis Date    Asthma     Back pain     Bilateral ovarian cysts     Bipolar 1 disorder (HCC)     Cancer (HCC)     adenoca in situ cervix    DDD (degenerative disc disease), lumbar     Headache(784.0)     IBS (irritable bowel syndrome)     OCD (obsessive compulsive disorder)     PTSD (post-traumatic stress disorder)     Vertigo      Economic problems    Plan:  Pt interventions:    Practiced assertive communication, Trained in strategies for increasing balanced thinking, Discussed self-care (sleep, nutrition, rewarding activities, social support, exercise), Discussed benefits of referral for specialty care, Motivational Interviewing to determine importance and readiness for change,

## 2018-09-26 ENCOUNTER — HOSPITAL ENCOUNTER (OUTPATIENT)
Dept: PHYSICAL THERAPY | Age: 38
Setting detail: THERAPIES SERIES
Discharge: HOME | End: 2018-09-26
Payer: MEDICARE

## 2018-09-26 PROCEDURE — 97110 THERAPEUTIC EXERCISES: CPT

## 2018-09-26 NOTE — FLOWSHEET NOTE
Physical Therapy Daily Treatment Note  Date:  2018    Patient Name:  Gregorio Schultz    :  1980  MRN: [id number]    Restrictions/Precautions:    High fall risk - has fallen several times  Pertinent Medical History: Additional Pertinent Hx: asthma, bipolar, cervical CA, DDD, HA, IBS,  OCD, PTSD    Medical/Treatment Diagnosis Information:  · Diagnosis: Left ankle sprain  · Treatment Diagnosis: Decreased functional mobility following left ankle sprain    Insurance/Certification information:  PT Insurance Information: Medicare (became active )  Physician Information:  Referring Practitioner: JR Tate f/U [id number]  Plan of care signed (Y/N):  Sent     Visit# / total visits:   Pain level: 3/10     G-Code   Visit 1    PT G-Codes  Functional Assessment Tool Used: LEFs  Score: 24  Functional Limitation: Mobility: Walking and moving around  Mobility: Walking and Moving Around Current Status (): At least 60 percent but less than 80 percent impaired, limited or restricted  Mobility: Walking and Moving Around Goal Status (): At least 40 percent but less than 60 percent impaired, limited or restricted     History of Injury:   Subjective: Pt notes that she fell when right leg gave out due to back issues. Has been in the boot since [address], Currently FWB. Has fallen two times, once when not wearing boot, once when wearing boot. Subjective:   Pain 2-6/10. Feels that right leg and back have contributed to left ankle issues due to history of nuimbness and pain on right side. Currently goes around the house with  gym shoes on, uses boot when outdoors. Can do steps one at a time, using  right to lead.  - Using ace wrap when not using boot at home, has been working on doing steps the proper way to protect Left foot. : Has been doing HEP as instructed. Just a little sore today.  - feels that foot does get swollen as she does more.    Other orthopedic issues Patient verbalized/demonstrated understanding and was issued written HEP. Timed Code Treatment Minutes:40    Total Treatment Minutes:  55    Treatment/Activity Tolerance:  [] Patient tolerated treatment well [] Patient limited by fatigue  [] Patient limited by pain  [] Patient limited by other medical complications  [] Other:     Prognosis: [x] Good [] Fair  [] Poor    Goals:    Short term goals  Time Frame for Short term goals: 2 weeks  Short term goal 1: Indep with HEP      Long term goals  Time Frame for Long term goals : Discharge  Long term goal 1: decrease pain to 1-2 on VAS to allow improved tolerance for ADL and light homemaking. Long term goal 2: Increase DF to 10 deg to normalize gait mechanics  Long term goal 3:  Increase strength to 4+/5 for normal gait on steps     Patient Requires Follow-up: [x] Yes  [] No    Plan:   [x] Continue per plan of care [] Alter current plan (see comments)  [] Plan of care initiated [] Hold pending MD visit [] Discharge    Plan for Next Session: continue with balance ex    Electronically signed by:  Mikel Jesus

## 2018-10-01 ENCOUNTER — APPOINTMENT (OUTPATIENT)
Dept: PHYSICAL THERAPY | Age: 38
End: 2018-10-01
Payer: MEDICARE

## 2018-10-03 ENCOUNTER — APPOINTMENT (OUTPATIENT)
Dept: PHYSICAL THERAPY | Age: 38
End: 2018-10-03
Payer: MEDICARE

## 2018-10-08 ENCOUNTER — APPOINTMENT (OUTPATIENT)
Dept: PHYSICAL THERAPY | Age: 38
End: 2018-10-08
Payer: MEDICARE

## 2018-10-12 ENCOUNTER — HOSPITAL ENCOUNTER (OUTPATIENT)
Dept: PHYSICAL THERAPY | Age: 38
Setting detail: THERAPIES SERIES
Discharge: HOME | End: 2018-10-12
Payer: MEDICARE

## 2018-10-12 PROCEDURE — 97530 THERAPEUTIC ACTIVITIES: CPT

## 2018-10-12 PROCEDURE — G8978 MOBILITY CURRENT STATUS: HCPCS

## 2018-10-12 PROCEDURE — G8979 MOBILITY GOAL STATUS: HCPCS

## 2018-10-12 PROCEDURE — 97110 THERAPEUTIC EXERCISES: CPT

## 2018-10-15 ENCOUNTER — HOSPITAL ENCOUNTER (OUTPATIENT)
Dept: PHYSICAL THERAPY | Age: 38
Setting detail: THERAPIES SERIES
Discharge: HOME | End: 2018-10-15
Payer: MEDICARE

## 2018-10-15 PROCEDURE — 97110 THERAPEUTIC EXERCISES: CPT

## 2018-10-15 PROCEDURE — 97530 THERAPEUTIC ACTIVITIES: CPT

## 2018-10-17 ENCOUNTER — OFFICE VISIT (OUTPATIENT)
Dept: ORTHOPEDIC SURGERY | Age: 38
End: 2018-10-17
Payer: MEDICAID

## 2018-10-17 ENCOUNTER — HOSPITAL ENCOUNTER (OUTPATIENT)
Dept: PHYSICAL THERAPY | Age: 38
Setting detail: THERAPIES SERIES
Discharge: HOME | End: 2018-10-17
Payer: MEDICARE

## 2018-10-17 VITALS
HEIGHT: 66 IN | DIASTOLIC BLOOD PRESSURE: 76 MMHG | BODY MASS INDEX: 30.22 KG/M2 | SYSTOLIC BLOOD PRESSURE: 119 MMHG | TEMPERATURE: 97.8 F | HEART RATE: 92 BPM | WEIGHT: 188 LBS

## 2018-10-17 DIAGNOSIS — S93.402A SPRAIN OF LEFT ANKLE, UNSPECIFIED LIGAMENT, INITIAL ENCOUNTER: Primary | ICD-10-CM

## 2018-10-17 PROCEDURE — G8417 CALC BMI ABV UP PARAM F/U: HCPCS | Performed by: PHYSICIAN ASSISTANT

## 2018-10-17 PROCEDURE — 99212 OFFICE O/P EST SF 10 MIN: CPT | Performed by: PHYSICIAN ASSISTANT

## 2018-10-17 PROCEDURE — 1036F TOBACCO NON-USER: CPT | Performed by: PHYSICIAN ASSISTANT

## 2018-10-17 PROCEDURE — 97110 THERAPEUTIC EXERCISES: CPT

## 2018-10-17 PROCEDURE — G8484 FLU IMMUNIZE NO ADMIN: HCPCS | Performed by: PHYSICIAN ASSISTANT

## 2018-10-17 PROCEDURE — G8427 DOCREV CUR MEDS BY ELIG CLIN: HCPCS | Performed by: PHYSICIAN ASSISTANT

## 2018-10-17 NOTE — FLOWSHEET NOTE
Physical Therapy Daily Treatment Note  Date:  10/17/2018    Patient Name:  Bethany Sandoval    :  1980  MRN: [id number]    Restrictions/Precautions:    High fall risk - has fallen several times  Pertinent Medical History: Additional Pertinent Hx: asthma, bipolar, cervical CA, DDD, HA, IBS,  OCD, PTSD    Medical/Treatment Diagnosis Information:  · Diagnosis: Left ankle sprain  · Treatment Diagnosis: Decreased functional mobility following left ankle sprain    Insurance/Certification information:  PT Insurance Information: Medicare (became active )  Physician Information:  Referring Practitioner: JR Munson f/U [id number]  Plan of care signed (Y/N):  Sent     Visit# / total visits:    Pain level: 3/10     G-Code   Visit 10  10/12/18  PT G-Codes  Functional Assessment Tool Used: LEFs  Score: 41    Functional Limitation: Mobility: Walking and moving around  Mobility: Walking and Moving Around Current Status (): At least 40 percent but less than 59 percent impaired, limited or restricted  Mobility: Walking and Moving Around Goal Status (): At least 20 percent but less than 39  percent impaired, limited or restricted     History of Injury:   Subjective: Pt notes that she fell when right leg gave out due to back issues. Has been in the boot since [address], Currently FWB. Has fallen two times, once when not wearing boot, once when wearing boot. Subjective:   Saw MD, pleased with progress. Finish remaining PT and cont strengthening ex. To [facility] as soon as approved by insurance. Objective:  Limited left ankle AROM, strength and gait/balance      Exercise/Equipment Resistance/Repetitions Other comments     Left ankle sprain 6/3/18    nustep 5 min level 2  seat 7 UE 8 Right side can get numb and weak at times. GSS incline 20 sec x 3 Pt to wear boot outside till .    Df step on step 10 sec x 3 L      Standing    BAPS  Fitter f/b ss  Step ups fwds    Pilates 1 red, 1 blue

## 2018-10-17 NOTE — PROGRESS NOTES
Subjective:      Patient ID: Genna Phillips is a 40 y.o.  female. Chief Complaint   Patient presents with    Ankle Pain     Left ankle        HPI: She is here for follow-up treatment. Left ankle sprain. Initial injury 6/3/18. She's had multiple falls since that time. She states her pain is improving and is now down to a 2 out of 10. Minor swelling at times. She is still in physical therapy working on proprioception/ balance. Review of Systems:   Negative for fever or chills. Past Medical History:   Diagnosis Date    Asthma     Back pain     Bilateral ovarian cysts     Bipolar 1 disorder (HCC)     Cancer (HCC)     adenoca in situ cervix    DDD (degenerative disc disease), lumbar     Headache(784.0)     IBS (irritable bowel syndrome)     OCD (obsessive compulsive disorder)     PTSD (post-traumatic stress disorder)     Vertigo        Family History   Problem Relation Age of Onset    Other Father         cirrhosis    Substance Abuse Father        Past Surgical History:   Procedure Laterality Date    CERVIX BIOPSY      HYSTERECTOMY VAGINAL  02/22/2017    HYSTERECTOMY, VAGINAL      WISDOM TOOTH EXTRACTION         Social History     Occupational History    Not on file.      Social History Main Topics    Smoking status: Never Smoker    Smokeless tobacco: Never Used    Alcohol use Yes      Comment: occasional    Drug use: No    Sexual activity: Yes     Partners: Male       Current Outpatient Prescriptions   Medication Sig Dispense Refill    PARoxetine (PAXIL) 10 MG tablet Take 1 tablet by mouth daily 30 tablet 3    prazosin (MINIPRESS) 2 MG capsule Take 1 capsule by mouth nightly 30 capsule 1    albuterol sulfate  (90 Base) MCG/ACT inhaler Inhale 2 puffs into the lungs every 6 hours as needed for Wheezing 1 Inhaler 12    albuterol (PROVENTIL) (2.5 MG/3ML) 0.083% nebulizer solution INHALE ONE VIAL VIA NEBULIZER BY MOUTH EVERY 6 HOURS AS NEEDED FOR SHORTNESS OF BREATH OR FOR WHEEZING 120 each 12    budesonide-formoterol (SYMBICORT) 160-4.5 MCG/ACT AERO Inhale 2 puffs into the lungs 2 times daily 1 Inhaler 12    meclizine (ANTIVERT) 12.5 MG tablet Take 1 tablet by mouth 3 times daily as needed for Dizziness 30 tablet 5    ondansetron (ZOFRAN) 4 MG tablet Take 1 tablet by mouth every 8 hours as needed for Nausea or Vomiting 30 tablet 3    ibuprofen (ADVIL;MOTRIN) 600 MG tablet Take 1 tablet by mouth every 6 hours as needed for Pain 20 tablet 0    acetaminophen (APAP EXTRA STRENGTH) 500 MG tablet Take 2 tablets by mouth every 6 hours as needed for Pain DO NOT TAKE WITH OTHER MEDICATIONS CONTAINING ACETAMINOPHEN. 30 tablet 0    pantoprazole (PROTONIX) 40 MG tablet       baclofen (LIORESAL) 10 MG tablet prn      ferrous sulfate 325 (65 FE) MG EC tablet Take 325 mg by mouth 3 times daily (with meals)      VITAMIN D, ERGOCALCIFEROL, PO Take by mouth once a week      SUMAtriptan (IMITREX) 50 MG tablet Take 50 mg by mouth once as needed for Migraine      montelukast (SINGULAIR) 10 MG tablet Take 10 mg by mouth nightly       No current facility-administered medications for this visit. Objective:   She is alert, oriented x 3, pleasant, well nourished, developed and in no acute distress. /76   Pulse 92   Temp 97.8 °F (36.6 °C) (Temporal)   Ht 5' 6\" (1.676 m)   Wt 188 lb (85.3 kg)   LMP 02/02/2016   BMI 30.34 kg/m²      ANKLE EXAM:  Examination of the left ankle demonstrates: There is mild swelling of the ankle. There is no joint effusion. There is no tenderness of the lateral malleolus. There is no tenderness of the medial malleolus. There is no tenderness of the fifth metatarsal.  There is mild tenderness over the ATFL. There is no tenderness over the proximal fibula. There is no tenderness of the calcaneous. The achilles is intact. Mayorga test negative. There is no laxity with anterior drawer testing. There is no laxity with Inversion testing. There is no laxity with Eversion testing. X Rays: not performed in the office today:   Diagnosis:        ICD-10-CM    1. Sprain of left ankle, unspecified ligament, initial encounter S93.402A         Assessment/ Plan:      The natural history of the patient's diagnosis as well as the treatment options were discussed in full and questions were answered. Risks and benefits of the treatment options also reviewed in detail. I recommended that she finish out her physical therapy for her resolving left ankle sprain. She will maintain exercises on a home exercise program thereafter. She's had multiple ankle sprains in the past and stress the need for continued strengthening exercises. Follow Up:   Call or return to clinic prn if these symptoms worsen or fail to improve as anticipated.

## 2018-10-22 ENCOUNTER — HOSPITAL ENCOUNTER (OUTPATIENT)
Dept: PHYSICAL THERAPY | Age: 38
Setting detail: THERAPIES SERIES
Discharge: HOME | End: 2018-10-22
Payer: MEDICARE

## 2018-10-22 ENCOUNTER — APPOINTMENT (OUTPATIENT)
Dept: PHYSICAL THERAPY | Age: 38
End: 2018-10-22
Payer: MEDICARE

## 2018-10-22 NOTE — FLOWSHEET NOTE
Stand Star   SLS purple band     4 steps x 4  15      Level 2 CW/ CCW x 20 ea L  1 thin x 20 each  L     40 # 2 x 10      R/l 30 sec each   30 sec         Rocker board  BOSU    Tandem  Tandem gait  Side steps  Crossovers fwd     bckwds gt  reebok      Interactive balance    FC - elliptical   F/b s/s 1  Min each  1 min stance    R/l 30 sec on gumdrops  10 ft x 4  10 ft x 4   10' x 2    10' x 2  Side to side F/B R/L 1 min ea    Wii -tilt board/ penguin/. raft. 10 min  5 min  Did prior to PT         cryocuff 15min post           4 way arom , standing HR/TR, calf stretch towel, tband 4 way, mini squats      Other Therapeutic Activities:  Pt was educated on PT POC, Diagnosis, Prognosis, pathomechanics as well as frequency and duration of scheduling future physical therapy appointments. Time was also taken on this day to answer all patient questions and participation in PT. Reviewed appointment policy in detail with patient and patient verbalized understanding. HEP  8-26-18 discussed appropriate aquatic ex to try. Home Exercise Program:  Patient instructed in the following for HEP:   Patient verbalized/demonstrated understanding and was issued written HEP. Timed Code Treatment Minutes:40    Total Treatment Minutes:  55    Treatment/Activity Tolerance:  [] Patient tolerated treatment well [] Patient limited by fatigue  [] Patient limited by pain  [] Patient limited by other medical complications  [] Other:     Prognosis: [x] Good [] Fair  [] Poor    Goals:    Short term goals  Time Frame for Short term goals: 2 weeks  Short term goal 1: Indep with HEP      Long term goals  Time Frame for Long term goals : Discharge  Long term goal 1: decrease pain to 1-2 on VAS to allow improved tolerance for ADL and light homemaking. Long term goal 2: Increase DF to 10 deg to normalize gait mechanics  Long term goal 3:  Increase strength to 4+/5 for normal gait on steps     Patient Requires Follow-up: [x] Yes  []

## 2018-10-24 ENCOUNTER — APPOINTMENT (OUTPATIENT)
Dept: PHYSICAL THERAPY | Age: 38
End: 2018-10-24
Payer: MEDICARE

## 2018-10-29 ENCOUNTER — APPOINTMENT (OUTPATIENT)
Dept: PHYSICAL THERAPY | Age: 38
End: 2018-10-29
Payer: MEDICARE

## 2018-11-02 ENCOUNTER — APPOINTMENT (OUTPATIENT)
Dept: PHYSICAL THERAPY | Age: 38
End: 2018-11-02
Payer: MEDICARE

## 2018-11-05 ENCOUNTER — OFFICE VISIT (OUTPATIENT)
Dept: PSYCHIATRY | Age: 38
End: 2018-11-05
Payer: MEDICARE

## 2018-11-05 VITALS
SYSTOLIC BLOOD PRESSURE: 128 MMHG | HEIGHT: 66 IN | HEART RATE: 85 BPM | DIASTOLIC BLOOD PRESSURE: 72 MMHG | WEIGHT: 196 LBS | BODY MASS INDEX: 31.5 KG/M2

## 2018-11-05 DIAGNOSIS — F60.3 BORDERLINE PERSONALITY DISORDER (HCC): Primary | ICD-10-CM

## 2018-11-05 DIAGNOSIS — F41.9 ANXIETY: ICD-10-CM

## 2018-11-05 DIAGNOSIS — F43.10 PTSD (POST-TRAUMATIC STRESS DISORDER): ICD-10-CM

## 2018-11-05 PROCEDURE — 1036F TOBACCO NON-USER: CPT | Performed by: PSYCHIATRY & NEUROLOGY

## 2018-11-05 PROCEDURE — 99213 OFFICE O/P EST LOW 20 MIN: CPT | Performed by: PSYCHIATRY & NEUROLOGY

## 2018-11-05 PROCEDURE — G8427 DOCREV CUR MEDS BY ELIG CLIN: HCPCS | Performed by: PSYCHIATRY & NEUROLOGY

## 2018-11-05 PROCEDURE — G8484 FLU IMMUNIZE NO ADMIN: HCPCS | Performed by: PSYCHIATRY & NEUROLOGY

## 2018-11-05 PROCEDURE — G8417 CALC BMI ABV UP PARAM F/U: HCPCS | Performed by: PSYCHIATRY & NEUROLOGY

## 2018-11-05 RX ORDER — PRAZOSIN HYDROCHLORIDE 2 MG/1
2 CAPSULE ORAL NIGHTLY
Qty: 90 CAPSULE | Refills: 1 | Status: SHIPPED | OUTPATIENT
Start: 2018-11-05 | End: 2019-04-17 | Stop reason: SDUPTHER

## 2018-11-05 RX ORDER — DIAZEPAM 5 MG/1
5 TABLET ORAL DAILY PRN
Qty: 30 TABLET | Refills: 2 | Status: SHIPPED | OUTPATIENT
Start: 2018-11-05 | End: 2019-01-25 | Stop reason: SDUPTHER

## 2018-11-06 RX ORDER — PRAZOSIN HYDROCHLORIDE 2 MG/1
2 CAPSULE ORAL NIGHTLY
Qty: 30 CAPSULE | Refills: 1 | OUTPATIENT
Start: 2018-11-06

## 2018-11-09 ENCOUNTER — HOSPITAL ENCOUNTER (OUTPATIENT)
Dept: PHYSICAL THERAPY | Age: 38
Setting detail: THERAPIES SERIES
Discharge: HOME | End: 2018-11-09
Payer: MEDICARE

## 2018-11-09 NOTE — PROGRESS NOTES
Physical Therapy  Just had epidural - was sent home by PT  Patient Name:  Hector Pantoja  :  1980   Date:  2018  Cancelled visits to date: 5  No-shows to date: 0    For today's appointment patient:  [x]  Cancelled   []  Rescheduled appointment  []  No-show     Reason given by patient:  []  Patient ill  []  Conflicting appointment  []  No transportation    []  Conflict with work  []  No reason given  []  Other:     Comments:      Electronically signed by:  Lubna Smith, [address]

## 2018-11-13 ENCOUNTER — OFFICE VISIT (OUTPATIENT)
Dept: PSYCHOLOGY | Age: 38
End: 2018-11-13
Payer: MEDICARE

## 2018-11-13 DIAGNOSIS — F43.10 PTSD (POST-TRAUMATIC STRESS DISORDER): Primary | ICD-10-CM

## 2018-11-13 PROCEDURE — 90832 PSYTX W PT 30 MINUTES: CPT | Performed by: PSYCHOLOGIST

## 2018-11-13 ASSESSMENT — PATIENT HEALTH QUESTIONNAIRE - PHQ9
SUM OF ALL RESPONSES TO PHQ QUESTIONS 1-9: 26
SUM OF ALL RESPONSES TO PHQ QUESTIONS 1-9: 26
4. FEELING TIRED OR HAVING LITTLE ENERGY: 3
10. IF YOU CHECKED OFF ANY PROBLEMS, HOW DIFFICULT HAVE THESE PROBLEMS MADE IT FOR YOU TO DO YOUR WORK, TAKE CARE OF THINGS AT HOME, OR GET ALONG WITH OTHER PEOPLE: 3
7. TROUBLE CONCENTRATING ON THINGS, SUCH AS READING THE NEWSPAPER OR WATCHING TELEVISION: 3
6. FEELING BAD ABOUT YOURSELF - OR THAT YOU ARE A FAILURE OR HAVE LET YOURSELF OR YOUR FAMILY DOWN: 3
2. FEELING DOWN, DEPRESSED OR HOPELESS: 3
9. THOUGHTS THAT YOU WOULD BE BETTER OFF DEAD, OR OF HURTING YOURSELF: 3
SUM OF ALL RESPONSES TO PHQ9 QUESTIONS 1 & 2: 5
5. POOR APPETITE OR OVEREATING: 3
3. TROUBLE FALLING OR STAYING ASLEEP: 3
1. LITTLE INTEREST OR PLEASURE IN DOING THINGS: 2
8. MOVING OR SPEAKING SO SLOWLY THAT OTHER PEOPLE COULD HAVE NOTICED. OR THE OPPOSITE, BEING SO FIGETY OR RESTLESS THAT YOU HAVE BEEN MOVING AROUND A LOT MORE THAN USUAL: 3

## 2018-11-13 NOTE — PROGRESS NOTES
6/29/2018 5/22/2018 12/8/2017   PHQ2 Score 5 5 4 6 6 6   PHQ9 Score 26 25 23 27 27 26     Interpretation of Total Score Depression Severity: 1-4 = Minimal depression, 5-9 = Mild depression, 10-14 = Moderate depression, 15-19 = Moderately severe depression, 20-27 = Severe depression    Denied any si/hi risk, intent, or plan. Diagnosis:    PTSD      Diagnosis Date    Asthma     Back pain     Bilateral ovarian cysts     Bipolar 1 disorder (HCC)     Cancer (HCC)     adenoca in situ cervix    DDD (degenerative disc disease), lumbar     Headache(784.0)     IBS (irritable bowel syndrome)     OCD (obsessive compulsive disorder)     PTSD (post-traumatic stress disorder)     Vertigo      Problems with primary support group, Problems related to the social environment, Occupational problems and Economic problems      Plan:  Pt interventions:    Practiced assertive communication, Trained in strategies for increasing balanced thinking, Discussed self-care (sleep, nutrition, rewarding activities, social support, exercise), Discussed benefits of referral for specialty care, Motivational Interviewing to determine importance and readiness for change, Discussed potential barriers to change and Supportive techniques    Pt Behavioral Change Plan:    1. Go to [address] [place] [address]: case management     Main and Administrative Office:  [facility], [address] [facility]; [employer] bus [address], [address], [address]  [phone] [facility] general information  [phone] [facility] new intake questions (no appointment necessary, Mon-Fri, 8:30 am-noon)  [phone] general information        2. Go to [facility] office for therapy services:     Demetrio Trejo Office:  [address] [place] [address]; [employer] [address]  [phone] [facility] general information  [phone] [facility] Open Access hours     3. Return to clinic for Dr. Brian Murphy as needed  4.  Continue to take break from AMMY Mensah at [facility] for

## 2018-11-15 ENCOUNTER — HOSPITAL ENCOUNTER (OUTPATIENT)
Dept: PHYSICAL THERAPY | Age: 38
Setting detail: THERAPIES SERIES
Discharge: HOME | End: 2018-11-15
Payer: MEDICARE

## 2018-11-15 PROCEDURE — 97110 THERAPEUTIC EXERCISES: CPT

## 2018-11-15 NOTE — FLOWSHEET NOTE
steps  8\"  Step ups      BAPS  Fitter f/b ss    Leg Press-squats        Stand Star   SLS purple band     4 steps x 4  15      Level 2 CW/ CCW x 20 ea L  1 thin x 20 each  L     40# 2 x 10      R/l 30 sec each   30 sec         Rocker board  BOSU    Tandem  Tandem gait  Side steps  Crossovers fwd     bckwds gt  reebok      Interactive balance    FC - elliptical   F/b s/s 1  Min each  1 min stance    R/l 30 sec on gumdrops  10 ft x 4  10 ft x 4   10' x 2    10' x 2  Side to side F/B R/L 1 min ea      5 min  Did prior to PT                           Held due to time       cryocuff 15min post           4 way arom , standing HR/TR, calf stretch towel, tband 4 way, mini squats      Other Therapeutic Activities:  Pt was educated on PT POC, Diagnosis, Prognosis, pathomechanics as well as frequency and duration of scheduling future physical therapy appointments. Time was also taken on this day to answer all patient questions and participation in PT. Reviewed appointment policy in detail with patient and patient verbalized understanding. HEP  8-26-18 discussed appropriate aquatic ex to try. Home Exercise Program:  Patient instructed in the following for HEP:   Patient verbalized/demonstrated understanding and was issued written HEP. Timed Code Treatment Minutes:35    Total Treatment Minutes:  50    Treatment/Activity Tolerance:  [] Patient tolerated treatment well [] Patient limited by fatigue  [] Patient limited by pain  [] Patient limited by other medical complications  [] Other:     Prognosis: [x] Good [] Fair  [] Poor    Goals:    Short term goals  Time Frame for Short term goals: 2 weeks  Short term goal 1: Indep with HEP      Long term goals  Time Frame for Long term goals : Discharge  Long term goal 1: decrease pain to 1-2 on VAS to allow improved tolerance for ADL and light homemaking. Long term goal 2: Increase DF to 10 deg to normalize gait mechanics  Long term goal 3:  Increase strength to 4+/5 for normal

## 2018-11-16 ENCOUNTER — APPOINTMENT (OUTPATIENT)
Dept: PHYSICAL THERAPY | Age: 38
End: 2018-11-16
Payer: MEDICARE

## 2018-11-19 ENCOUNTER — APPOINTMENT (OUTPATIENT)
Dept: PHYSICAL THERAPY | Age: 38
End: 2018-11-19
Payer: MEDICARE

## 2018-11-30 ENCOUNTER — HOSPITAL ENCOUNTER (OUTPATIENT)
Dept: PHYSICAL THERAPY | Age: 38
Setting detail: THERAPIES SERIES
Discharge: HOME | End: 2018-11-30
Payer: MEDICARE

## 2018-11-30 NOTE — PROGRESS NOTES
Physical Therapy  Cancel  migraine  Patient Name:  Swati Doan  :  1980   Date:  2018  Cancelled visits to date: 10  No-shows to date: 0    For today's appointment patient:  [x]  Cancelled   []  Rescheduled appointment  []  No-show     Reason given by patient:  []  Patient ill  []  Conflicting appointment  []  No transportation    []  Conflict with work  []  No reason given  []  Other:     Comments:      Electronically signed by:  Jaquelin Riggs, [address] [facility] [address]

## 2018-12-07 ENCOUNTER — HOSPITAL ENCOUNTER (OUTPATIENT)
Dept: PHYSICAL THERAPY | Age: 38
Setting detail: THERAPIES SERIES
Discharge: HOME | End: 2018-12-07
Payer: MEDICARE

## 2018-12-07 PROCEDURE — G8979 MOBILITY GOAL STATUS: HCPCS

## 2018-12-07 PROCEDURE — G8980 MOBILITY D/C STATUS: HCPCS

## 2018-12-07 PROCEDURE — 97530 THERAPEUTIC ACTIVITIES: CPT

## 2018-12-07 PROCEDURE — 97110 THERAPEUTIC EXERCISES: CPT

## 2018-12-17 ENCOUNTER — OFFICE VISIT (OUTPATIENT)
Dept: INTERNAL MEDICINE CLINIC | Age: 38
End: 2018-12-17
Payer: MEDICAID

## 2018-12-17 VITALS
HEART RATE: 68 BPM | RESPIRATION RATE: 16 BRPM | DIASTOLIC BLOOD PRESSURE: 70 MMHG | HEIGHT: 66 IN | BODY MASS INDEX: 30.22 KG/M2 | WEIGHT: 188 LBS | SYSTOLIC BLOOD PRESSURE: 106 MMHG

## 2018-12-17 DIAGNOSIS — R11.0 CHRONIC NAUSEA: ICD-10-CM

## 2018-12-17 DIAGNOSIS — J45.40 MODERATE PERSISTENT ASTHMA WITHOUT COMPLICATION: Primary | ICD-10-CM

## 2018-12-17 DIAGNOSIS — R42 VERTIGO: ICD-10-CM

## 2018-12-17 DIAGNOSIS — J30.9 ALLERGIC RHINITIS, UNSPECIFIED SEASONALITY, UNSPECIFIED TRIGGER: ICD-10-CM

## 2018-12-17 DIAGNOSIS — M51.36 DDD (DEGENERATIVE DISC DISEASE), LUMBAR: ICD-10-CM

## 2018-12-17 DIAGNOSIS — L98.9 SKIN LESION: ICD-10-CM

## 2018-12-17 DIAGNOSIS — F31.9 BIPOLAR AFFECTIVE DISORDER, REMISSION STATUS UNSPECIFIED (HCC): ICD-10-CM

## 2018-12-17 PROBLEM — S93.402A SPRAIN OF LEFT ANKLE: Status: RESOLVED | Noted: 2018-06-14 | Resolved: 2018-12-17

## 2018-12-17 PROBLEM — E55.9 VITAMIN D DEFICIENCY: Status: RESOLVED | Noted: 2017-07-06 | Resolved: 2018-12-17

## 2018-12-17 PROCEDURE — G8427 DOCREV CUR MEDS BY ELIG CLIN: HCPCS | Performed by: INTERNAL MEDICINE

## 2018-12-17 PROCEDURE — G0008 ADMIN INFLUENZA VIRUS VAC: HCPCS | Performed by: INTERNAL MEDICINE

## 2018-12-17 PROCEDURE — 90686 IIV4 VACC NO PRSV 0.5 ML IM: CPT | Performed by: INTERNAL MEDICINE

## 2018-12-17 PROCEDURE — G8417 CALC BMI ABV UP PARAM F/U: HCPCS | Performed by: INTERNAL MEDICINE

## 2018-12-17 PROCEDURE — 99213 OFFICE O/P EST LOW 20 MIN: CPT | Performed by: INTERNAL MEDICINE

## 2018-12-17 PROCEDURE — G8482 FLU IMMUNIZE ORDER/ADMIN: HCPCS | Performed by: INTERNAL MEDICINE

## 2018-12-17 PROCEDURE — 1036F TOBACCO NON-USER: CPT | Performed by: INTERNAL MEDICINE

## 2018-12-17 RX ORDER — ONDANSETRON 4 MG/1
4 TABLET, FILM COATED ORAL EVERY 8 HOURS PRN
Qty: 30 TABLET | Refills: 3 | Status: SHIPPED | OUTPATIENT
Start: 2018-12-17 | End: 2019-12-30

## 2018-12-17 ASSESSMENT — ENCOUNTER SYMPTOMS
SHORTNESS OF BREATH: 0
COUGH: 0
NAUSEA: 0
VOMITING: 0

## 2019-01-25 ENCOUNTER — OFFICE VISIT (OUTPATIENT)
Dept: PSYCHIATRY | Age: 39
End: 2019-01-25
Payer: COMMERCIAL

## 2019-01-25 VITALS
HEART RATE: 76 BPM | SYSTOLIC BLOOD PRESSURE: 110 MMHG | HEIGHT: 66 IN | WEIGHT: 197 LBS | BODY MASS INDEX: 31.66 KG/M2 | DIASTOLIC BLOOD PRESSURE: 70 MMHG

## 2019-01-25 DIAGNOSIS — F41.9 ANXIETY: ICD-10-CM

## 2019-01-25 DIAGNOSIS — F60.3 BORDERLINE PERSONALITY DISORDER (HCC): Primary | ICD-10-CM

## 2019-01-25 DIAGNOSIS — F43.10 PTSD (POST-TRAUMATIC STRESS DISORDER): ICD-10-CM

## 2019-01-25 PROCEDURE — G8427 DOCREV CUR MEDS BY ELIG CLIN: HCPCS | Performed by: PSYCHIATRY & NEUROLOGY

## 2019-01-25 PROCEDURE — 1036F TOBACCO NON-USER: CPT | Performed by: PSYCHIATRY & NEUROLOGY

## 2019-01-25 PROCEDURE — G8417 CALC BMI ABV UP PARAM F/U: HCPCS | Performed by: PSYCHIATRY & NEUROLOGY

## 2019-01-25 PROCEDURE — G8482 FLU IMMUNIZE ORDER/ADMIN: HCPCS | Performed by: PSYCHIATRY & NEUROLOGY

## 2019-01-25 PROCEDURE — 99214 OFFICE O/P EST MOD 30 MIN: CPT | Performed by: PSYCHIATRY & NEUROLOGY

## 2019-01-25 RX ORDER — DIAZEPAM 5 MG/1
5 TABLET ORAL DAILY PRN
Qty: 30 TABLET | Refills: 2 | Status: SHIPPED | OUTPATIENT
Start: 2019-01-25 | End: 2019-09-13 | Stop reason: SDUPTHER

## 2019-01-25 RX ORDER — ESCITALOPRAM OXALATE 10 MG/1
10 TABLET ORAL DAILY
Qty: 30 TABLET | Refills: 3 | Status: SHIPPED | OUTPATIENT
Start: 2019-01-25 | End: 2019-04-17

## 2019-04-10 ENCOUNTER — OFFICE VISIT (OUTPATIENT)
Dept: DERMATOLOGY | Age: 39
End: 2019-04-10
Payer: COMMERCIAL

## 2019-04-10 DIAGNOSIS — L82.0 SEBORRHEIC KERATOSES, INFLAMED: Primary | ICD-10-CM

## 2019-04-10 DIAGNOSIS — D22.9 MULTIPLE BENIGN MELANOCYTIC NEVI: ICD-10-CM

## 2019-04-10 DIAGNOSIS — Z12.83 SKIN CANCER SCREENING: ICD-10-CM

## 2019-04-10 PROCEDURE — 99202 OFFICE O/P NEW SF 15 MIN: CPT | Performed by: DERMATOLOGY

## 2019-04-10 PROCEDURE — 17110 DESTRUCTION B9 LES UP TO 14: CPT | Performed by: DERMATOLOGY

## 2019-04-10 NOTE — PATIENT INSTRUCTIONS
Please be mindful of your sun protection strategies, including use of broad spectrum sunscreen with SPF of at least 30, sun guard clothing with UPF (available at most sporting [employer] stores), broad brimmed hat, sunglasses, and sun avoidance during peak hours of the day. ABCDE's of melanoma also reviewed:     ASYMMETRY OF MOLE,   BORDER STRANGE,   COLOR CHANGING OR BECOMING DARKER,   DIAMETER ENLARGING,   EVOLUTION (EXISTING MOLE IS CHANGING WITH ANY OF THE AFOREMENTIONED CHANGES, MOLE IS BLEEDING OR TENDER, OR NEW MOLES DEVELOPING SUDDENLY)    Please email me through [place] or call office if any spot or mole changes, defined as becoming larger, darker, multi-colored, or becomes symptomatic (bleeding, tender, etc).

## 2019-04-10 NOTE — PROGRESS NOTES
[address] [facility], [facility]    Previous clinic visit: none      CC:  mole check, irritated lesion on back    HPI:    1.)  here today for FBSE. Denies new or changing nevi. No personal h/o skin cancers. 2.)  Has an itchy irritated spot on L back under bra strap. No tx to date.      DERM HISTORY:   Personal history of NMSC or MM- no    Family history of NMSC or MM- yes - father, unknown type   Sunburns easily- Yes   Uses sunscreen- Yes  History of tanning bed use- No    ADDITIONAL HISTORY:    I have reviewed past medical and surgical histories, current medications, allergies, social and family histories as documented in the patient's electronic medical record.      Current Outpatient Medications   Medication Sig Dispense Refill    escitalopram (LEXAPRO) 10 MG tablet Take 1 tablet by mouth daily 30 tablet 3    ondansetron (ZOFRAN) 4 MG tablet Take 1 tablet by mouth every 8 hours as needed for Nausea or Vomiting 30 tablet 3    prazosin (MINIPRESS) 2 MG capsule Take 1 capsule by mouth nightly 90 capsule 1    albuterol sulfate  (90 Base) MCG/ACT inhaler Inhale 2 puffs into the lungs every 6 hours as needed for Wheezing 1 Inhaler 12    albuterol (PROVENTIL) (2.5 MG/3ML) 0.083% nebulizer solution INHALE ONE VIAL VIA NEBULIZER BY MOUTH EVERY 6 HOURS AS NEEDED FOR SHORTNESS OF BREATH OR FOR WHEEZING 120 each 12    budesonide-formoterol (SYMBICORT) 160-4.5 MCG/ACT AERO Inhale 2 puffs into the lungs 2 times daily 1 Inhaler 12    meclizine (ANTIVERT) 12.5 MG tablet Take 1 tablet by mouth 3 times daily as needed for Dizziness 30 tablet 5    ibuprofen (ADVIL;MOTRIN) 600 MG tablet Take 1 tablet by mouth every 6 hours as needed for Pain 20 tablet 0    SUMAtriptan (IMITREX) 50 MG tablet Take 50 mg by mouth once as needed for Migraine      montelukast (SINGULAIR) 10 MG tablet Take 10 mg by mouth nightly      acetaminophen (APAP EXTRA STRENGTH) 500 MG tablet Take 2 tablets by mouth every 6 hours as needed for Pain DO NOT TAKE WITH OTHER MEDICATIONS CONTAINING ACETAMINOPHEN. 30 tablet 0    pantoprazole (PROTONIX) 40 MG tablet       baclofen (LIORESAL) 10 MG tablet prn      ferrous sulfate 325 (65 FE) MG EC tablet Take 325 mg by mouth 3 times daily (with meals)      VITAMIN D, ERGOCALCIFEROL, PO Take by mouth once a week       No current facility-administered medications for this visit. ROS:    General: No fevers, chills, sweats, unexplained weight loss or weight gain, fatigue, malaise   Skin: Denies additional lesions    Heme: No history of bleeding diatheses    Allergy: Denies seasonal or environmental allergies or other medication allergies     PHYSICAL EXAM:    General: Well-appearing, NAD      Integument: Examination was performed of the following and were unremarkable except as otherwise noted below:psych/neuro, scalp, hair, face, ears, conjunctivae/eyelids, gums/teeth/lips, buccal mucosa, oropharynx, neck, breast/axilla/chest, abdomen, groin, back, buttocks, RUE, LUE,  digits/nails. Genital exam deferred per patient.      Abnormalities noted include:    1.) Torso and extremities with several variably sized scattered brown to tan round smooth melanocytic macules and papules  2.)  Mid back with a few mm scaly stuck on papule    ASSESSMENT AND PLAN:    1.) Multiple scattered acquired melanocytic nevi with banal features:   - Reassurance  - Edu: patient regarding sun protection strategies, including use of broad spectrum sunscreen with SPF of at least 30, sun guard clothing, broad brimmed hat, sunglasses, and sun avoidance during peak hours of the day. ABCDE's of melanoma also reviewed    2.)  ISK:  - LN2 x 15 sec x 2 cycles x 1 lesions; edu: irritation, dyspigmentation, redness, possible recurrence, use of Vaseline until healed    Return to Clinic:  1-2 yrs and prn changes  Discussed plan with patient and/or primary caretaker. Patient to call clinic with any questions / concerns.    Reviewed

## 2019-04-10 NOTE — LETTER
[facility]  [address] [facility] [place] [address]  Phone: [phone]  Fax: [phone]    Kalie Wade MD        April 10, 2019     [facility] [address]    Patient: Rochelle Leal  MR Number: [id number]  YOB: 1980  Date of Visit: 4/10/2019    Dear Dr. Ty Patterson:    Thank you for the request for consultation for Rochelle Leal to me for the evaluation of moles. Below are the relevant portions of my assessment and plan of care. If you have questions, please do not hesitate to call me. I look forward to following Brunilda along with you.     Sincerely,        Kalie Wade MD

## 2019-04-17 ENCOUNTER — OFFICE VISIT (OUTPATIENT)
Dept: PSYCHIATRY | Age: 39
End: 2019-04-17
Payer: COMMERCIAL

## 2019-04-17 VITALS
DIASTOLIC BLOOD PRESSURE: 70 MMHG | SYSTOLIC BLOOD PRESSURE: 110 MMHG | HEART RATE: 75 BPM | WEIGHT: 200 LBS | HEIGHT: 65 IN | BODY MASS INDEX: 33.32 KG/M2

## 2019-04-17 DIAGNOSIS — F60.3 BORDERLINE PERSONALITY DISORDER (HCC): Primary | ICD-10-CM

## 2019-04-17 DIAGNOSIS — Z86.59 HISTORY OF BIPOLAR DISORDER: ICD-10-CM

## 2019-04-17 DIAGNOSIS — F41.9 ANXIETY: ICD-10-CM

## 2019-04-17 DIAGNOSIS — F43.10 PTSD (POST-TRAUMATIC STRESS DISORDER): ICD-10-CM

## 2019-04-17 PROCEDURE — G8427 DOCREV CUR MEDS BY ELIG CLIN: HCPCS | Performed by: PSYCHIATRY & NEUROLOGY

## 2019-04-17 PROCEDURE — G8417 CALC BMI ABV UP PARAM F/U: HCPCS | Performed by: PSYCHIATRY & NEUROLOGY

## 2019-04-17 PROCEDURE — 99214 OFFICE O/P EST MOD 30 MIN: CPT | Performed by: PSYCHIATRY & NEUROLOGY

## 2019-04-17 PROCEDURE — 1036F TOBACCO NON-USER: CPT | Performed by: PSYCHIATRY & NEUROLOGY

## 2019-04-17 RX ORDER — FLUOXETINE HYDROCHLORIDE 20 MG/1
20 CAPSULE ORAL DAILY
Qty: 30 CAPSULE | Refills: 3 | Status: SHIPPED | OUTPATIENT
Start: 2019-04-17 | End: 2019-06-14 | Stop reason: SDUPTHER

## 2019-04-17 RX ORDER — PRAZOSIN HYDROCHLORIDE 2 MG/1
2 CAPSULE ORAL NIGHTLY
Qty: 90 CAPSULE | Refills: 1 | Status: SHIPPED | OUTPATIENT
Start: 2019-04-17 | End: 2019-06-14 | Stop reason: SDUPTHER

## 2019-04-17 NOTE — PROGRESS NOTES
Psych Follow Up Progress Note    4/17/19  Hector Pantoja  [id number]    I have reviewed recent documentation:  Hector Pantoja is a 41 y.o. female  This patient  has a past medical history of Asthma, Back pain, Bilateral ovarian cysts, Bipolar 1 disorder ([address] Cancer ([facility] [address] DDD (degenerative disc disease), lumbar, Headache(784.0), IBS (irritable bowel syndrome), Multiple benign melanocytic nevi, OCD (obsessive compulsive disorder), PTSD (post-traumatic stress disorder), and Vertigo. Chief Complaint   Patient presents with    Depression    Anxiety       Subjective/Interval Hx:  Started stepping down lexapro last week. Had some strange a/v sx. Very depressed, no energy, eating lots of carbs. Terrible concentration. Not going out of the house. Back pain is miserable. Heath King is dying of old age. At her usual baseline suicidality. Location:  Mind  Severity:  severe  Context:  As above. Modifiers:  Getting off lexapro. Quality:  Depression, anxiety. Objective:  Vitals:    04/17/19 1435   BP: 110/70   Pulse: 75   Weight: 200 lb (90.7 kg)   Height: 5' 5\" (1.651 m)     Body mass index is 33.28 kg/m². No results found for this or any previous visit (from the past 168 hour(s)).     Current Outpatient Medications   Medication Sig Dispense Refill    escitalopram (LEXAPRO) 10 MG tablet Take 1 tablet by mouth daily 30 tablet 3    ondansetron (ZOFRAN) 4 MG tablet Take 1 tablet by mouth every 8 hours as needed for Nausea or Vomiting 30 tablet 3    prazosin (MINIPRESS) 2 MG capsule Take 1 capsule by mouth nightly 90 capsule 1    albuterol sulfate  (90 Base) MCG/ACT inhaler Inhale 2 puffs into the lungs every 6 hours as needed for Wheezing 1 Inhaler 12    albuterol (PROVENTIL) (2.5 MG/3ML) 0.083% nebulizer solution INHALE ONE VIAL VIA NEBULIZER BY MOUTH EVERY 6 HOURS AS NEEDED FOR SHORTNESS OF BREATH OR FOR WHEEZING 120 each 12    budesonide-formoterol (SYMBICORT) 160-4.5 MCG/ACT AERO Inhale 2 puffs into the lungs 2 times daily 1 Inhaler 12    meclizine (ANTIVERT) 12.5 MG tablet Take 1 tablet by mouth 3 times daily as needed for Dizziness 30 tablet 5    ibuprofen (ADVIL;MOTRIN) 600 MG tablet Take 1 tablet by mouth every 6 hours as needed for Pain 20 tablet 0    acetaminophen (APAP EXTRA STRENGTH) 500 MG tablet Take 2 tablets by mouth every 6 hours as needed for Pain DO NOT TAKE WITH OTHER MEDICATIONS CONTAINING ACETAMINOPHEN. 30 tablet 0    pantoprazole (PROTONIX) 40 MG tablet       baclofen (LIORESAL) 10 MG tablet prn      ferrous sulfate 325 (65 FE) MG EC tablet Take 325 mg by mouth 3 times daily (with meals)      VITAMIN D, ERGOCALCIFEROL, PO Take by mouth once a week      SUMAtriptan (IMITREX) 50 MG tablet Take 50 mg by mouth once as needed for Migraine      montelukast (SINGULAIR) 10 MG tablet Take 10 mg by mouth nightly       No current facility-administered medications for this visit. ROS:  No tremor, gait nl    MSE:    A-casually dressed, good EC  A-full to ruefull laughing  M-more depressed, anxious recently. S-grossly A + O  I/J-fair/fair  T-linear, goal-directed.  Speech c nl r/t/v/a.  No S/H I, no A/V H.         1.  BAD by hx, PTSD, OCD by hx, BPD-Pt feels like prozac was helpful in the past, we'll try 20 mg qd. I'll d'c lexapro for pt's irritability and these strange visual phenomena. Cont prazosin 2 qhs.  Cont valium 5 mg daily prn.  This clinical picture is most c/w Borderline PD/PTSD in my opinion. Primus Liz Dr. Susan Matta.  R/b/a d/w pt including worse mood, depression, suicidality, dyan.   I have told pt she really, really need DBT. She agrees. Referred for ECT.     Has been on:  Paxil, clomip, prozac, vpa, amitrip, lexapro.     I have spent >25 mins with this patient on working on coping skills and med mgt, and > 1/2 of that time was spent counseling this pt.

## 2019-04-29 ENCOUNTER — OFFICE VISIT (OUTPATIENT)
Dept: INTERNAL MEDICINE CLINIC | Age: 39
End: 2019-04-29
Payer: COMMERCIAL

## 2019-04-29 VITALS
TEMPERATURE: 98 F | WEIGHT: 202 LBS | BODY MASS INDEX: 33.61 KG/M2 | OXYGEN SATURATION: 99 % | HEART RATE: 77 BPM | SYSTOLIC BLOOD PRESSURE: 110 MMHG | DIASTOLIC BLOOD PRESSURE: 60 MMHG
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
## 2019-04-29 PROCEDURE — G8417 CALC BMI ABV UP PARAM F/U: HCPCS | Performed by: INTERNAL MEDICINE

## 2019-04-29 PROCEDURE — G8427 DOCREV CUR MEDS BY ELIG CLIN: HCPCS | Performed by: INTERNAL MEDICINE

## 2019-04-29 PROCEDURE — 99212 OFFICE O/P EST SF 10 MIN: CPT | Performed by: INTERNAL MEDICINE

## 2019-04-29 PROCEDURE — 1036F TOBACCO NON-USER: CPT | Performed by: INTERNAL MEDICINE

## 2019-04-29 RX ORDER — DOXYCYCLINE HYCLATE 100 MG
100 TABLET ORAL 2 TIMES DAILY
Qty: 28 TABLET | Refills: 0 | Status: SHIPPED | OUTPATIENT
Start: 2019-04-29 | End: 2019-05-13

## 2019-04-29 NOTE — PROGRESS NOTES
Subjective:    cc:  Tick bite  Patient ID: Alexus Cadena is a 41 y.o. female. HPI  Found tick attached to left inner thigh 2 d ago. Worried about lyme dz. No f/c/n/v. No redness around bite. Review of Systems   Constitutional: Negative for chills, fatigue, fever and unexpected weight change. Skin: Negative for rash. Prior to Visit Medications    Medication Sig Taking? Authorizing Provider   prazosin (MINIPRESS) 2 MG capsule Take 1 capsule by mouth nightly Yes Benjamín Mederos MD   FLUoxetine (PROZAC) 20 MG capsule Take 1 capsule by mouth daily Yes Benjamín Mederos MD   ondansetron (ZOFRAN) 4 MG tablet Take 1 tablet by mouth every 8 hours as needed for Nausea or Vomiting Yes Keyla Mckoy MD   albuterol sulfate  (90 Base) MCG/ACT inhaler Inhale 2 puffs into the lungs every 6 hours as needed for Wheezing Yes Keyla Mckoy MD   albuterol (PROVENTIL) (2.5 MG/3ML) 0.083% nebulizer solution INHALE ONE VIAL VIA NEBULIZER BY MOUTH EVERY 6 HOURS AS NEEDED FOR SHORTNESS OF BREATH OR FOR WHEEZING Yes SACHA Potter MD   budesonide-formoterol (SYMBICORT) 160-4.5 MCG/ACT AERO Inhale 2 puffs into the lungs 2 times daily Yes Keyla Mckoy MD   meclizine (ANTIVERT) 12.5 MG tablet Take 1 tablet by mouth 3 times daily as needed for Dizziness Yes Keyla Mckoy MD   ibuprofen (ADVIL;MOTRIN) 600 MG tablet Take 1 tablet by mouth every 6 hours as needed for Pain Yes JR Man   acetaminophen (APAP EXTRA STRENGTH) 500 MG tablet Take 2 tablets by mouth every 6 hours as needed for Pain DO NOT TAKE WITH OTHER MEDICATIONS CONTAINING ACETAMINOPHEN.  Yes JR Man   pantoprazole (PROTONIX) 40 MG tablet  Yes Historical Provider, MD   baclofen (LIORESAL) 10 MG tablet prn Yes Historical Provider, MD   ferrous sulfate 325 (65 FE) MG EC tablet Take 325 mg by mouth 3 times daily (with meals) Yes Historical Provider, MD   VITAMIN D, ERGOCALCIFEROL, PO Take by mouth once a week Yes Historical Provider, MD   SUMAtriptan (IMITREX) 50 MG tablet Take 50 mg by mouth once as needed for Migraine Yes Historical Provider, MD   montelukast (SINGULAIR) 10 MG tablet Take 10 mg by mouth nightly Yes Historical Provider, MD     /60 (Site: Left Upper Arm, Position: Sitting, Cuff Size: Large Adult)   Pulse 77   Temp 98 °F (36.7 °C) (Oral)   Wt 202 lb (91.6 kg)   LMP 02/02/2016   SpO2 99%   BMI 33.61 kg/m²   Allergies   Allergen Reactions    Amitriptyline      Bipolar reaction       Objective:   Physical Exam   Constitutional: She appears well-developed and well-nourished. No distress. Skin: Skin is warm and dry. She is not diaphoretic.   3 mm area of erythema left inner thigh with center punctum. No d/c. Nontender. Assessment:      1. Tick bite, initial encounter            Plan:      Remi Claude was seen today for rash. Diagnoses and all orders for this visit:    Tick bite, initial encounter  -     doxycycline hyclate (VIBRA-TABS) 100 MG tablet;  Take 1 tablet by mouth 2 times daily for 14 days              Kenji Hightower MD

## 2019-05-10 PROBLEM — Z12.83 SKIN CANCER SCREENING: Status: RESOLVED | Noted: 2019-04-10 | Resolved: 2019-05-10

## 2019-05-29 DIAGNOSIS — R42 VERTIGO: ICD-10-CM

## 2019-05-29 RX ORDER — MONTELUKAST SODIUM 10 MG/1
10 TABLET ORAL NIGHTLY
Qty: 30 TABLET | Refills: 3 | Status: SHIPPED | OUTPATIENT
Start: 2019-05-29 | End: 2019-06-27 | Stop reason: SDUPTHER

## 2019-05-29 RX ORDER — MECLIZINE HCL 12.5 MG/1
12.5 TABLET ORAL 3 TIMES DAILY PRN
Qty: 30 TABLET | Refills: 2 | Status: SHIPPED | OUTPATIENT
Start: 2019-05-29 | End: 2021-06-21

## 2019-05-29 NOTE — TELEPHONE ENCOUNTER
Refill request   Future Appointments   Date Time Provider Mina De La Cruzi   6/14/2019  3:30 PM Alex Betts MD [facility]   6/17/2019  1:00 PM Aniket Gorman MD [facility]   6/25/2019  2:00 PM ABNER [facility]      last office visit : 04/29/2019

## 2019-06-14 ENCOUNTER — OFFICE VISIT (OUTPATIENT)
Dept: PSYCHIATRY | Age: 39
End: 2019-06-14
Payer: COMMERCIAL

## 2019-06-14 VITALS
SYSTOLIC BLOOD PRESSURE: 120 MMHG | HEIGHT: 66 IN | DIASTOLIC BLOOD PRESSURE: 80 MMHG | BODY MASS INDEX: 31.66 KG/M2 | HEART RATE: 97 BPM | WEIGHT: 197 LBS

## 2019-06-14 DIAGNOSIS — F41.9 ANXIETY: ICD-10-CM

## 2019-06-14 DIAGNOSIS — F60.3 BORDERLINE PERSONALITY DISORDER (HCC): Primary | ICD-10-CM

## 2019-06-14 DIAGNOSIS — F43.10 PTSD (POST-TRAUMATIC STRESS DISORDER): ICD-10-CM

## 2019-06-14 PROCEDURE — 99214 OFFICE O/P EST MOD 30 MIN: CPT | Performed by: PSYCHIATRY & NEUROLOGY

## 2019-06-14 PROCEDURE — G8417 CALC BMI ABV UP PARAM F/U: HCPCS | Performed by: PSYCHIATRY & NEUROLOGY

## 2019-06-14 PROCEDURE — G8427 DOCREV CUR MEDS BY ELIG CLIN: HCPCS | Performed by: PSYCHIATRY & NEUROLOGY

## 2019-06-14 PROCEDURE — 1036F TOBACCO NON-USER: CPT | Performed by: PSYCHIATRY & NEUROLOGY

## 2019-06-14 RX ORDER — PRAZOSIN HYDROCHLORIDE 1 MG/1
3 CAPSULE ORAL NIGHTLY
Qty: 90 CAPSULE | Refills: 5 | Status: SHIPPED | OUTPATIENT
Start: 2019-06-14 | End: 2019-10-30

## 2019-06-14 RX ORDER — FLUOXETINE HYDROCHLORIDE 20 MG/1
20 CAPSULE ORAL DAILY
Qty: 90 CAPSULE | Refills: 1 | Status: SHIPPED | OUTPATIENT
Start: 2019-06-14 | End: 2019-09-13 | Stop reason: SDUPTHER

## 2019-06-14 NOTE — PROGRESS NOTES
Psych Follow Up Progress Note    6/14/19  Anastacio Constanitno  [id number]    I have reviewed recent documentation:  Anastacio Constantino is a 41 y.o. female  This patient  has a past medical history of Asthma, Back pain, Bilateral ovarian cysts, Bipolar 1 disorder ([address] Cancer ([facility] [address] DDD (degenerative disc disease), lumbar, Headache(784.0), IBS (irritable bowel syndrome), Multiple benign melanocytic nevi, OCD (obsessive compulsive disorder), PTSD (post-traumatic stress disorder), and Vertigo. Chief Complaint   Patient presents with    Other       Subjective/Interval Hx:  Feels prozac is immensely helpful. Able to tune out some obsessive anxiety such that she's not so paralyzed! Still feels things but somehow just isn't attaching existential, paralyzing importance as much. Horrible things still happen, Sadiq has a non-cancerous tumor, aunt ran off with pt's dad's money, etc.  But it's all sort of not ok, but manageable. Meditating! Listening to ragas! Going to get acupuncture! Planting things! Takes valium maybe once a week! More nightmares related to Max not being there. Location:  Mind  Severity:  mod  Context:  As above. Modifiers:  prozac helps  Quality:  Anxiety, depression    Objective:  Vitals:    06/14/19 1533   BP: 120/80   Pulse: 97   Weight: 197 lb (89.4 kg)   Height: 5' 6\" (1.676 m)     Body mass index is 31.8 kg/m². No results found for this or any previous visit (from the past 168 hour(s)).     Current Outpatient Medications   Medication Sig Dispense Refill    montelukast (SINGULAIR) 10 MG tablet Take 1 tablet by mouth nightly 30 tablet 3    meclizine (ANTIVERT) 12.5 MG tablet Take 1 tablet by mouth 3 times daily as needed for Dizziness 30 tablet 2    prazosin (MINIPRESS) 2 MG capsule Take 1 capsule by mouth nightly 90 capsule 1    FLUoxetine (PROZAC) 20 MG capsule Take 1 capsule by mouth daily 30 capsule 3    ondansetron (ZOFRAN) 4 MG tablet Take 1 tablet by mouth every 8 hours as needed for Nausea or Vomiting 30 tablet 3    albuterol sulfate  (90 Base) MCG/ACT inhaler Inhale 2 puffs into the lungs every 6 hours as needed for Wheezing 1 Inhaler 12    albuterol (PROVENTIL) (2.5 MG/3ML) 0.083% nebulizer solution INHALE ONE VIAL VIA NEBULIZER BY MOUTH EVERY 6 HOURS AS NEEDED FOR SHORTNESS OF BREATH OR FOR WHEEZING 120 each 12    budesonide-formoterol (SYMBICORT) 160-4.5 MCG/ACT AERO Inhale 2 puffs into the lungs 2 times daily 1 Inhaler 12    ibuprofen (ADVIL;MOTRIN) 600 MG tablet Take 1 tablet by mouth every 6 hours as needed for Pain 20 tablet 0    acetaminophen (APAP EXTRA STRENGTH) 500 MG tablet Take 2 tablets by mouth every 6 hours as needed for Pain DO NOT TAKE WITH OTHER MEDICATIONS CONTAINING ACETAMINOPHEN. 30 tablet 0    pantoprazole (PROTONIX) 40 MG tablet       baclofen (LIORESAL) 10 MG tablet prn      ferrous sulfate 325 (65 FE) MG EC tablet Take 325 mg by mouth 3 times daily (with meals)      VITAMIN D, ERGOCALCIFEROL, PO Take by mouth once a week      SUMAtriptan (IMITREX) 50 MG tablet Take 50 mg by mouth once as needed for Migraine       No current facility-administered medications for this visit. ROS:  No tremor, gait nl    Controlled Substance Monitoring:    Acute and Chronic Pain Monitoring:   RX Monitoring 6/14/2019   Attestation -   Periodic Controlled Substance Monitoring No signs of potential drug abuse or diversion identified. MSE:  A-casually dressed, good EC  A-full! M-more depressed, anxious recently. S-grossly A + O  I/J-fair/fair  T-linear, goal-directed.  Speech c nl r/t/v/a.  No S/H I, no A/V H.         1.  BAD by hx, PTSD, OCD by hx, BPD-Cont prozac 20 mg qd. Cont prazosin 3 qhs.  Cont valium 5 mg daily prn.  This clinical picture is most c/w Borderline PD/PTSD in my opinion. Pieter Spearing Dr. Guerda Malloy.  R/b/a d/w pt including worse mood, depression, suicidality, dyan.   I have told pt she really, really needs DBT. She's decided to do this online. She agrees. Referred for ECT.     Has been on:  Paxil, clomip, prozac, vpa, amitrip, lexapro. I have spent >25 mins with this patient on working on coping skills and med mgt, and > 1/2 of that time was spent counseling this pt.

## 2019-06-25 ENCOUNTER — OFFICE VISIT (OUTPATIENT)
Dept: PSYCHOLOGY | Age: 39
End: 2019-06-25
Payer: COMMERCIAL

## 2019-06-25 DIAGNOSIS — F43.10 PTSD (POST-TRAUMATIC STRESS DISORDER): Primary | ICD-10-CM

## 2019-06-25 DIAGNOSIS — J45.40 MODERATE PERSISTENT ASTHMA WITHOUT COMPLICATION: ICD-10-CM

## 2019-06-25 PROCEDURE — 90832 PSYTX W PT 30 MINUTES: CPT | Performed by: PSYCHOLOGIST

## 2019-06-25 RX ORDER — BUDESONIDE AND FORMOTEROL FUMARATE DIHYDRATE 160; 4.5 UG/1; UG/1
AEROSOL RESPIRATORY (INHALATION)
Qty: 1 INHALER | Refills: 12 | Status: SHIPPED | OUTPATIENT
Start: 2019-06-25 | End: 2019-06-27 | Stop reason: SDUPTHER

## 2019-06-25 RX ORDER — ALBUTEROL SULFATE 2.5 MG/3ML
SOLUTION RESPIRATORY (INHALATION)
Qty: 360 EACH | Refills: 12 | Status: SHIPPED | OUTPATIENT
Start: 2019-06-25 | End: 2019-06-27 | Stop reason: SDUPTHER

## 2019-06-25 ASSESSMENT — PATIENT HEALTH QUESTIONNAIRE - PHQ9
SUM OF ALL RESPONSES TO PHQ QUESTIONS 1-9: 21
8. MOVING OR SPEAKING SO SLOWLY THAT OTHER PEOPLE COULD HAVE NOTICED. OR THE OPPOSITE, BEING SO FIGETY OR RESTLESS THAT YOU HAVE BEEN MOVING AROUND A LOT MORE THAN USUAL: 3
SUM OF ALL RESPONSES TO PHQ QUESTIONS 1-9: 21
7. TROUBLE CONCENTRATING ON THINGS, SUCH AS READING THE NEWSPAPER OR WATCHING TELEVISION: 3
9. THOUGHTS THAT YOU WOULD BE BETTER OFF DEAD, OR OF HURTING YOURSELF: 0
3. TROUBLE FALLING OR STAYING ASLEEP: 2
6. FEELING BAD ABOUT YOURSELF - OR THAT YOU ARE A FAILURE OR HAVE LET YOURSELF OR YOUR FAMILY DOWN: 3
4. FEELING TIRED OR HAVING LITTLE ENERGY: 1
1. LITTLE INTEREST OR PLEASURE IN DOING THINGS: 3
SUM OF ALL RESPONSES TO PHQ9 QUESTIONS 1 & 2: 6
2. FEELING DOWN, DEPRESSED OR HOPELESS: 3
5. POOR APPETITE OR OVEREATING: 3
10. IF YOU CHECKED OFF ANY PROBLEMS, HOW DIFFICULT HAVE THESE PROBLEMS MADE IT FOR YOU TO DO YOUR WORK, TAKE CARE OF THINGS AT HOME, OR GET ALONG WITH OTHER PEOPLE: 1

## 2019-06-25 ASSESSMENT — ANXIETY QUESTIONNAIRES
GAD7 TOTAL SCORE: 20
2. NOT BEING ABLE TO STOP OR CONTROL WORRYING: 3-NEARLY EVERY DAY
1. FEELING NERVOUS, ANXIOUS, OR ON EDGE: 3-NEARLY EVERY DAY
6. BECOMING EASILY ANNOYED OR IRRITABLE: 2-OVER HALF THE DAYS
3. WORRYING TOO MUCH ABOUT DIFFERENT THINGS: 3-NEARLY EVERY DAY
7. FEELING AFRAID AS IF SOMETHING AWFUL MIGHT HAPPEN: 3-NEARLY EVERY DAY
4. TROUBLE RELAXING: 3-NEARLY EVERY DAY
5. BEING SO RESTLESS THAT IT IS HARD TO SIT STILL: 3-NEARLY EVERY DAY

## 2019-06-25 NOTE — TELEPHONE ENCOUNTER
Refill request   Future Appointments   Date Time Provider Mina Edwards   6/25/2019  2:00 PM [place], [place] [facility]   6/27/2019  2:00 PM Marylee Somerset, [address] [place] [address]   9/13/2019  3:00 PM Sandy Baker MD [facility]      Last office visit : 04/29/2019

## 2019-06-27 ENCOUNTER — OFFICE VISIT (OUTPATIENT)
Dept: INTERNAL MEDICINE CLINIC | Age: 39
End: 2019-06-27
Payer: COMMERCIAL

## 2019-06-27 VITALS
SYSTOLIC BLOOD PRESSURE: 132 MMHG | DIASTOLIC BLOOD PRESSURE: 88 MMHG | HEART RATE: 72 BPM | BODY MASS INDEX: 31.66 KG/M2 | WEIGHT: 197 LBS | HEIGHT: 66 IN

## 2019-06-27 DIAGNOSIS — M50.30 DDD (DEGENERATIVE DISC DISEASE), CERVICAL: ICD-10-CM

## 2019-06-27 DIAGNOSIS — J45.40 MODERATE PERSISTENT ASTHMA WITHOUT COMPLICATION: Primary | ICD-10-CM

## 2019-06-27 DIAGNOSIS — D50.9 IRON DEFICIENCY ANEMIA, UNSPECIFIED IRON DEFICIENCY ANEMIA TYPE: ICD-10-CM

## 2019-06-27 DIAGNOSIS — Z13.31 POSITIVE DEPRESSION SCREENING: ICD-10-CM

## 2019-06-27 DIAGNOSIS — K21.9 GASTROESOPHAGEAL REFLUX DISEASE WITHOUT ESOPHAGITIS: ICD-10-CM

## 2019-06-27 DIAGNOSIS — F43.10 PTSD (POST-TRAUMATIC STRESS DISORDER): ICD-10-CM

## 2019-06-27 DIAGNOSIS — F31.9 BIPOLAR 1 DISORDER (HCC): ICD-10-CM

## 2019-06-27 PROCEDURE — 99214 OFFICE O/P EST MOD 30 MIN: CPT | Performed by: NURSE PRACTITIONER

## 2019-06-27 PROCEDURE — G8431 POS CLIN DEPRES SCRN F/U DOC: HCPCS | Performed by: NURSE PRACTITIONER

## 2019-06-27 PROCEDURE — G8417 CALC BMI ABV UP PARAM F/U: HCPCS | Performed by: NURSE PRACTITIONER

## 2019-06-27 PROCEDURE — 1036F TOBACCO NON-USER: CPT | Performed by: NURSE PRACTITIONER

## 2019-06-27 PROCEDURE — G8427 DOCREV CUR MEDS BY ELIG CLIN: HCPCS | Performed by: NURSE PRACTITIONER

## 2019-06-27 RX ORDER — ALBUTEROL SULFATE 2.5 MG/3ML
SOLUTION RESPIRATORY (INHALATION)
Qty: 360 EACH | Refills: 12 | Status: SHIPPED | OUTPATIENT
Start: 2019-06-27 | End: 2019-09-16 | Stop reason: SDUPTHER

## 2019-06-27 RX ORDER — MONTELUKAST SODIUM 10 MG/1
10 TABLET ORAL NIGHTLY
Qty: 30 TABLET | Refills: 3 | Status: SHIPPED | OUTPATIENT
Start: 2019-06-27 | End: 2019-09-05

## 2019-06-27 RX ORDER — BUDESONIDE AND FORMOTEROL FUMARATE DIHYDRATE 160; 4.5 UG/1; UG/1
AEROSOL RESPIRATORY (INHALATION)
Qty: 1 INHALER | Refills: 12 | Status: SHIPPED | OUTPATIENT
Start: 2019-06-27 | End: 2019-09-16 | Stop reason: SDUPTHER

## 2019-06-27 ASSESSMENT — ENCOUNTER SYMPTOMS
WATER BRASH: 0
FREQUENT THROAT CLEARING: 0
SORE THROAT: 0
HOARSE VOICE: 0
CHOKING: 0
SINUS PAIN: 0
BELCHING: 0
BLURRED VISION: 0
VOMITING: 0
HEMOPTYSIS: 0
CONSTIPATION: 0
SINUS PRESSURE: 0
NAUSEA: 0
BACK PAIN: 0
ABDOMINAL PAIN: 0
STRIDOR: 0
SHORTNESS OF BREATH: 0
DIARRHEA: 0
SPUTUM PRODUCTION: 0
ORTHOPNEA: 0
COLOR CHANGE: 0
EYE PAIN: 0
CHEST TIGHTNESS: 0
COUGH: 0
DIFFICULTY BREATHING: 0
GLOBUS SENSATION: 0
HEARTBURN: 0
WHEEZING: 0
PHOTOPHOBIA: 0
RHINORRHEA: 0
TROUBLE SWALLOWING: 0

## 2019-06-27 NOTE — PROGRESS NOTES
Pt is here for: asthma, bipolar disorder, DDD, iron def anemia, GERD, anxiety/depression, HTN   Chief Complaint   Patient presents with    Asthma     6 month follow up, has been doing accupuncture, needs referral to aquatic therapy       Asthma   There is no chest tightness, cough, difficulty breathing, frequent throat clearing, hemoptysis, hoarse voice, shortness of breath, sputum production or wheezing. This is a chronic problem. The current episode started more than 1 year ago. The problem occurs intermittently. The problem has been waxing and waning. Pertinent negatives include no appetite change, chest pain, fever, headaches, heartburn, malaise/fatigue, orthopnea, PND, rhinorrhea, sneezing, sore throat, sweats, trouble swallowing or weight loss. Her symptoms are aggravated by change in weather, strenuous activity and URI. Her symptoms are alleviated by beta-agonist and change in position. She reports significant improvement on treatment. Her past medical history is significant for asthma. There is no history of bronchiectasis, bronchitis, emphysema or pneumonia. Gastroesophageal Reflux   She reports no abdominal pain, no belching, no chest pain, no choking, no coughing, no dysphagia, no early satiety, no globus sensation, no heartburn, no hoarse voice, no nausea, no sore throat, no stridor, no tooth decay, no water brash or no wheezing. This is a chronic problem. The current episode started more than 1 year ago. The problem has been waxing and waning. The symptoms are aggravated by certain foods. Pertinent negatives include no anemia, fatigue, melena, muscle weakness, orthopnea or weight loss. She has tried a PPI for the symptoms. The treatment provided significant relief. Hypertension   This is a chronic problem. The current episode started more than 1 year ago. The problem is unchanged. The problem is controlled. Associated symptoms include anxiety.  Pertinent negatives include no blurred vision, chest pain, headaches, malaise/fatigue, neck pain, orthopnea, palpitations, peripheral edema, PND, shortness of breath or sweats. Risk factors for coronary artery disease include family history, obesity, stress and sedentary lifestyle. Past treatments include lifestyle changes (minipress). The current treatment provides significant improvement. Compliance problems include diet and exercise. Bipolar 1 disorder  This problem is chronic and stable. Patient f/w Dr. Mey Hernandez as OP. Reports medication compliance, denies mood fluctuations or sleep disturbances. Denies any acute concerns at this time. Iron Deficiency Anemia  This problem is chronic and stable. Patient currently taking replacements as instructed. Denies fatigue, SOB, or fatigue. Will monitor   DDD  This problem is chronic and stable. Patient currntly undergoing accupunture therapy. Requesting Aquatic therapy referral. Currently taking baclofen.    /88   Pulse 72   Ht 5' 6\" (1.676 m)   Wt 197 lb (89.4 kg)   LMP 02/02/2016   BMI 31.80 kg/m²       Past Medical History:   Diagnosis Date    Asthma     Back pain     Bilateral ovarian cysts     Bipolar 1 disorder (HCC)     Cancer (HCC)     adenoca in situ cervix    DDD (degenerative disc disease), lumbar     Headache(784.0)     IBS (irritable bowel syndrome)     Multiple benign melanocytic nevi 4/10/2019    OCD (obsessive compulsive disorder)     PTSD (post-traumatic stress disorder)     Vertigo        Past Surgical History:   Procedure Laterality Date    CERVIX BIOPSY      HYSTERECTOMY VAGINAL  02/22/2017    HYSTERECTOMY, VAGINAL      WISDOM TOOTH EXTRACTION         Allergies   Allergen Reactions    Amitriptyline      Bipolar reaction       Outpatient Medications Marked as Taking for the 6/27/19 encounter (Office Visit) with Marylee Somerset, APRN - CNP   Medication Sig Dispense Refill    SYMBICORT 160-4.5 MCG/ACT AERO INHALE TWO PUFFS BY MOUTH TWICE A DAY 1 Inhaler 12    albuterol (PROVENTIL) (2.5 MG/3ML) 0.083% nebulizer solution INHALE ONE VIAL VIA NEBULIZATION BY MOUTH EVERY 6 HOURS AS NEEDED FOR SHORTNESS OF BREATH OR FOR WHEEZING 360 each 12    prazosin (MINIPRESS) 1 MG capsule Take 3 capsules by mouth nightly 90 capsule 5    FLUoxetine (PROZAC) 20 MG capsule Take 1 capsule by mouth daily 90 capsule 1    montelukast (SINGULAIR) 10 MG tablet Take 1 tablet by mouth nightly 30 tablet 3    meclizine (ANTIVERT) 12.5 MG tablet Take 1 tablet by mouth 3 times daily as needed for Dizziness 30 tablet 2    ondansetron (ZOFRAN) 4 MG tablet Take 1 tablet by mouth every 8 hours as needed for Nausea or Vomiting 30 tablet 3    albuterol sulfate  (90 Base) MCG/ACT inhaler Inhale 2 puffs into the lungs every 6 hours as needed for Wheezing 1 Inhaler 12    ibuprofen (ADVIL;MOTRIN) 600 MG tablet Take 1 tablet by mouth every 6 hours as needed for Pain 20 tablet 0    acetaminophen (APAP EXTRA STRENGTH) 500 MG tablet Take 2 tablets by mouth every 6 hours as needed for Pain DO NOT TAKE WITH OTHER MEDICATIONS CONTAINING ACETAMINOPHEN.  30 tablet 0    pantoprazole (PROTONIX) 40 MG tablet       baclofen (LIORESAL) 10 MG tablet prn      VITAMIN D, ERGOCALCIFEROL, PO Take by mouth once a week      SUMAtriptan (IMITREX) 50 MG tablet Take 50 mg by mouth once as needed for Migraine         Family History   Problem Relation Age of Onset    Other Father         cirrhosis    Substance Abuse Father     Cancer Father        Social History     Socioeconomic History    Marital status: Single     Spouse name: Not on file    Number of children: Not on file    Years of education: Not on file    Highest education level: Not on file   Occupational History    Not on file   Social Needs    Financial resource strain: Not on file    Food insecurity:     Worry: Not on file     Inability: Not on file    Transportation needs:     Medical: Not on file     Non-medical: Not on file   Tobacco Use    Smoking status: Never Smoker    Smokeless tobacco: Never Used   Substance and Sexual Activity    Alcohol use: Yes     Comment: occasional    Drug use: No    Sexual activity: Yes     Partners: Male   Lifestyle    Physical activity:     Days per week: Not on file     Minutes per session: Not on file    Stress: Not on file   Relationships    Social connections:     Talks on phone: Not on file     Gets together: Not on file     Attends Judaism service: Not on file     Active member of club or organization: Not on file     Attends meetings of clubs or organizations: Not on file     Relationship status: Not on file    Intimate partner violence:     Fear of current or ex partner: Not on file     Emotionally abused: Not on file     Physically abused: Not on file     Forced sexual activity: Not on file   Other Topics Concern    Not on file   Social History Narrative    Not on file       Review of Systems   Constitutional: Negative for activity change, appetite change, fatigue, fever, malaise/fatigue and weight loss. HENT: Negative for congestion, hoarse voice, rhinorrhea, sinus pressure, sinus pain, sneezing, sore throat and trouble swallowing. Eyes: Negative for blurred vision, photophobia and pain. Respiratory: Negative for cough, hemoptysis, sputum production, choking, chest tightness, shortness of breath and wheezing. Cardiovascular: Negative for chest pain, palpitations, orthopnea, leg swelling and PND. Gastrointestinal: Negative for abdominal pain, constipation, diarrhea, dysphagia, heartburn, melena, nausea and vomiting. Endocrine: Negative for polydipsia, polyphagia and polyuria. Genitourinary: Negative for difficulty urinating, dysuria and hematuria. Musculoskeletal: Negative for back pain, muscle weakness and neck pain. Skin: Negative for color change, pallor and rash. Neurological: Negative for dizziness, tremors, syncope, weakness, numbness and headaches.    Hematological: Negative for adenopathy. Psychiatric/Behavioral: Negative for confusion, decreased concentration, dysphoric mood, hallucinations, self-injury, sleep disturbance and suicidal ideas. The patient is nervous/anxious. The patient is not hyperactive. Physical Exam   Nursing note reviewed  Wt 197 lb (89.4 kg)   LMP 02/02/2016   BMI 31.80 kg/m²   BP Readings from Last 3 Encounters:   06/14/19 120/80   04/29/19 110/60   04/17/19 110/70     Wt Readings from Last 3 Encounters:   06/27/19 197 lb (89.4 kg)   06/14/19 197 lb (89.4 kg)   04/29/19 202 lb (91.6 kg)     Body mass index is 31.8 kg/m². No results found for this visit on 06/27/19. /88   Pulse 72   Ht 5' 6\" (1.676 m)   Wt 197 lb (89.4 kg)   LMP 02/02/2016   BMI 31.80 kg/m²       Physical Exam   Constitutional: She is oriented to person, place, and time. She appears well-developed and well-nourished. HENT:   Head: Normocephalic. Nose: Right sinus exhibits no maxillary sinus tenderness and no frontal sinus tenderness. Left sinus exhibits no maxillary sinus tenderness and no frontal sinus tenderness. Mouth/Throat: No oropharyngeal exudate, posterior oropharyngeal edema, posterior oropharyngeal erythema or tonsillar abscesses. Eyes: Pupils are equal, round, and reactive to light. Right eye exhibits no discharge. Left eye exhibits no discharge. Neck: Normal range of motion. No JVD present. No tracheal deviation present. No thyromegaly present. Cardiovascular: Normal rate and regular rhythm. No murmur heard. Pulmonary/Chest: Breath sounds normal. No respiratory distress. She has no wheezes. She has no rales. She exhibits no tenderness. Abdominal: Soft. Bowel sounds are normal. She exhibits no distension and no mass. There is no tenderness. There is no rebound and no guarding. Musculoskeletal: Normal range of motion. She exhibits no edema or tenderness. Lymphadenopathy:     She has no cervical adenopathy.    Neurological: She is

## 2019-07-01 DIAGNOSIS — J45.40 MODERATE PERSISTENT ASTHMA WITHOUT COMPLICATION: ICD-10-CM

## 2019-07-01 RX ORDER — ALBUTEROL SULFATE 90 UG/1
AEROSOL, METERED RESPIRATORY (INHALATION)
Qty: 1 INHALER | Refills: 11 | Status: SHIPPED | OUTPATIENT
Start: 2019-07-01 | End: 2019-09-16 | Stop reason: SDUPTHER

## 2019-07-03 ENCOUNTER — HOSPITAL ENCOUNTER (EMERGENCY)
Age: 39
Discharge: HOME | End: 2019-07-03
Payer: COMMERCIAL

## 2019-07-03 VITALS
WEIGHT: 200.4 LBS | DIASTOLIC BLOOD PRESSURE: 81 MMHG | SYSTOLIC BLOOD PRESSURE: 119 MMHG | RESPIRATION RATE: 14 BRPM | OXYGEN SATURATION: 98 % | TEMPERATURE: 98.6 F | BODY MASS INDEX: 32.35 KG/M2 | HEART RATE: 87 BPM

## 2019-07-03 DIAGNOSIS — K08.89 PAIN, DENTAL: Primary | ICD-10-CM

## 2019-07-03 PROCEDURE — 99282 EMERGENCY DEPT VISIT SF MDM: CPT

## 2019-07-03 RX ORDER — ONDANSETRON 4 MG/1
4 TABLET, ORALLY DISINTEGRATING ORAL EVERY 8 HOURS PRN
Qty: 20 TABLET | Refills: 0 | Status: SHIPPED | OUTPATIENT
Start: 2019-07-03 | End: 2019-09-16 | Stop reason: SDUPTHER

## 2019-07-03 RX ORDER — IBUPROFEN 800 MG/1
800 TABLET ORAL EVERY 8 HOURS PRN
Qty: 30 TABLET | Refills: 0 | Status: SHIPPED | OUTPATIENT
Start: 2019-07-03 | End: 2021-06-21

## 2019-07-03 RX ORDER — PENICILLIN V POTASSIUM 500 MG/1
500 TABLET ORAL 4 TIMES DAILY
Qty: 28 TABLET | Refills: 0 | Status: SHIPPED | OUTPATIENT
Start: 2019-07-03 | End: 2019-07-10

## 2019-07-03 RX ORDER — ACETAMINOPHEN 500 MG
500 TABLET ORAL EVERY 6 HOURS PRN
Qty: 30 TABLET | Refills: 0 | Status: SHIPPED | OUTPATIENT
Start: 2019-07-03 | End: 2021-06-21

## 2019-07-03 ASSESSMENT — ENCOUNTER SYMPTOMS
FACIAL SWELLING: 1
DIARRHEA: 0
EYE PAIN: 0
VOMITING: 0
ABDOMINAL PAIN: 0
SHORTNESS OF BREATH: 0
COUGH: 0
NAUSEA: 0
BACK PAIN: 0

## 2019-07-03 ASSESSMENT — PAIN DESCRIPTION - FREQUENCY: FREQUENCY: CONTINUOUS

## 2019-07-03 ASSESSMENT — PAIN DESCRIPTION - PAIN TYPE: TYPE: ACUTE PAIN

## 2019-07-03 ASSESSMENT — PAIN SCALES - GENERAL
PAINLEVEL_OUTOF10: 4
PAINLEVEL_OUTOF10: 4

## 2019-07-03 ASSESSMENT — PAIN DESCRIPTION - ORIENTATION: ORIENTATION: LEFT;LOWER

## 2019-07-03 ASSESSMENT — PAIN DESCRIPTION - DESCRIPTORS: DESCRIPTORS: THROBBING

## 2019-07-03 ASSESSMENT — PAIN DESCRIPTION - LOCATION: LOCATION: TEETH

## 2019-07-03 ASSESSMENT — PAIN - FUNCTIONAL ASSESSMENT: PAIN_FUNCTIONAL_ASSESSMENT: 0-10

## 2019-07-04 NOTE — ED PROVIDER NOTES
which have NOT CHANGED    Details   albuterol sulfate  (90 Base) MCG/ACT inhaler INHALE TWO PUFFS BY MOUTH EVERY 6 HOURS AS NEEDED FOR WHEEZING, Disp-1 Inhaler, R-11Normal      montelukast (SINGULAIR) 10 MG tablet Take 1 tablet by mouth nightly, Disp-30 tablet, R-3Normal      albuterol (PROVENTIL) (2.5 MG/3ML) 0.083% nebulizer solution INHALE ONE VIAL VIA NEBULIZATION BY MOUTH EVERY 6 HOURS AS NEEDED FOR SHORTNESS OF BREATH OR FOR WHEEZING, Disp-360 each, R-12Normal      budesonide-formoterol (SYMBICORT) 160-4.5 MCG/ACT AERO INHALE TWO PUFFS BY MOUTH TWICE A DAY, Disp-1 Inhaler, R-12Normal      prazosin (MINIPRESS) 1 MG capsule Take 3 capsules by mouth nightly, Disp-90 capsule, R-5Normal      FLUoxetine (PROZAC) 20 MG capsule Take 1 capsule by mouth daily, Disp-90 capsule, R-1Normal      meclizine (ANTIVERT) 12.5 MG tablet Take 1 tablet by mouth 3 times daily as needed for Dizziness, Disp-30 tablet, R-2Normal      ondansetron (ZOFRAN) 4 MG tablet Take 1 tablet by mouth every 8 hours as needed for Nausea or Vomiting, Disp-30 tablet, R-3Normal      pantoprazole (PROTONIX) 40 MG tablet Historical Med      baclofen (LIORESAL) 10 MG tablet prnHistorical Med      ferrous sulfate 325 (65 FE) MG EC tablet Take 325 mg by mouth 3 times daily (with meals)Historical Med      VITAMIN D, ERGOCALCIFEROL, PO Take by mouth once a weekHistorical Med      SUMAtriptan (IMITREX) 50 MG tablet Take 50 mg by mouth once as needed for Migraine               Review of Systems:  Review of Systems   Constitutional: Negative for chills, fatigue and fever. HENT: Positive for dental problem and facial swelling. Eyes: Negative for pain. Respiratory: Negative for cough and shortness of breath. Cardiovascular: Negative for chest pain. Gastrointestinal: Negative for abdominal pain, diarrhea, nausea and vomiting. Genitourinary: Negative for dysuria. Musculoskeletal: Negative for back pain, neck pain and neck stiffness.    Skin:

## 2019-07-22 ENCOUNTER — OFFICE VISIT (OUTPATIENT)
Dept: PSYCHOLOGY | Age: 39
End: 2019-07-22
Payer: COMMERCIAL

## 2019-07-22 DIAGNOSIS — F43.10 PTSD (POST-TRAUMATIC STRESS DISORDER): Primary | ICD-10-CM

## 2019-07-22 PROCEDURE — 90832 PSYTX W PT 30 MINUTES: CPT | Performed by: PSYCHOLOGIST

## 2019-07-22 ASSESSMENT — PATIENT HEALTH QUESTIONNAIRE - PHQ9
6. FEELING BAD ABOUT YOURSELF - OR THAT YOU ARE A FAILURE OR HAVE LET YOURSELF OR YOUR FAMILY DOWN: 2
10. IF YOU CHECKED OFF ANY PROBLEMS, HOW DIFFICULT HAVE THESE PROBLEMS MADE IT FOR YOU TO DO YOUR WORK, TAKE CARE OF THINGS AT HOME, OR GET ALONG WITH OTHER PEOPLE: 3
8. MOVING OR SPEAKING SO SLOWLY THAT OTHER PEOPLE COULD HAVE NOTICED. OR THE OPPOSITE, BEING SO FIGETY OR RESTLESS THAT YOU HAVE BEEN MOVING AROUND A LOT MORE THAN USUAL: 3
3. TROUBLE FALLING OR STAYING ASLEEP: 3
SUM OF ALL RESPONSES TO PHQ9 QUESTIONS 1 & 2: 6
9. THOUGHTS THAT YOU WOULD BE BETTER OFF DEAD, OR OF HURTING YOURSELF: 3
SUM OF ALL RESPONSES TO PHQ QUESTIONS 1-9: 26
2. FEELING DOWN, DEPRESSED OR HOPELESS: 3
5. POOR APPETITE OR OVEREATING: 3
SUM OF ALL RESPONSES TO PHQ QUESTIONS 1-9: 26
1. LITTLE INTEREST OR PLEASURE IN DOING THINGS: 3
7. TROUBLE CONCENTRATING ON THINGS, SUCH AS READING THE NEWSPAPER OR WATCHING TELEVISION: 3
4. FEELING TIRED OR HAVING LITTLE ENERGY: 3

## 2019-07-22 ASSESSMENT — ANXIETY QUESTIONNAIRES
5. BEING SO RESTLESS THAT IT IS HARD TO SIT STILL: 3-NEARLY EVERY DAY
2. NOT BEING ABLE TO STOP OR CONTROL WORRYING: 3-NEARLY EVERY DAY
7. FEELING AFRAID AS IF SOMETHING AWFUL MIGHT HAPPEN: 3-NEARLY EVERY DAY
GAD7 TOTAL SCORE: 21
1. FEELING NERVOUS, ANXIOUS, OR ON EDGE: 3-NEARLY EVERY DAY
3. WORRYING TOO MUCH ABOUT DIFFERENT THINGS: 3-NEARLY EVERY DAY
6. BECOMING EASILY ANNOYED OR IRRITABLE: 3-NEARLY EVERY DAY
4. TROUBLE RELAXING: 3-NEARLY EVERY DAY

## 2019-07-22 NOTE — PATIENT INSTRUCTIONS
1. Continue with DBT counseling on line for now, goal is to get to more formalized DBT treatment down the road  2. Continue to take psychiatric medication as prescribed, go to Dr Jerdo Little on 9/13  3. Continue to take break from [address] at [facility] for now  4. Go to specialist appointment with Sadiq for his potential surgery on 9/5   5.  Return to clinic for Dr Sheldon Love, move from 8/20 to 8/13 at 2:00 pm

## 2019-07-22 NOTE — PROGRESS NOTES
Behavioral Health Consultation Follow-up  Denise Almonte PsyD  Psychologist  7/22/2019  2:40 PM      Time spent with Patient: 25 minutes  This is patient's ninth  [facility] appointment. Reason for Consult:  PTSD  Referring Provider: Eunice Hatchet, MD  [place]   [address]  [address], [place] [facility] [address]    Feedback given to PCP. S:    Last appt: 6/25. Came back from pill cam medical procedure with Sadiq, found 2 tickets from [employer] $1000 for Whitney Rowe on 7/24, pt did contested this and won.  Didn't have to pay court cost.     Max surgery: 8/5 follow up with his specialist, insurance wants to have pill cam again at the hospital and could do surgery that day     Continues with acupuncture treatments: 4 weeks in a row, now have to [employer] because they want to stop 5, but hoping once it is resolved with insurance, will be every 2 weeks    Great news: going to get back from [place] [address] in August which is great news, reduces financial stress    O:  MSE:    Appearance    alert, cooperative  Appetite abnormal: poor  Sleep disturbance Yes  Fatigue Yes  Loss of pleasure Yes  Impulsive behavior No  Speech    normal rate, normal volume and well articulated  Mood    Anxious  Depressed  Affect    Congruent with full range  Thought Content    intact  Thought Process    linear, goal directed and coherent  Associations    logical connections  Insight    Good  Judgment    Intact  Orientation    oriented to person, place, time, and general circumstances  Memory    recent and remote memory intact  Attention/Concentration    intact  Morbid ideation Yes  Suicide Assessment    no suicidal ideation      History:    Medications:   Current Outpatient Medications   Medication Sig Dispense Refill    ibuprofen (ADVIL;MOTRIN) 800 MG tablet Take 1 tablet by mouth every 8 hours as needed for Pain 30 tablet 0    acetaminophen (APAP EXTRA STRENGTH) 500 MG tablet Take 1 tablet by mouth every 6 hours as needed for Pain 30 tablet 0   

## 2019-07-24 ENCOUNTER — HOSPITAL ENCOUNTER (OUTPATIENT)
Dept: PHYSICAL THERAPY | Age: 39
Setting detail: THERAPIES SERIES
Discharge: HOME | End: 2019-07-24
Payer: COMMERCIAL

## 2019-07-24 ENCOUNTER — TELEPHONE (OUTPATIENT)
Dept: INTERNAL MEDICINE CLINIC | Age: 39
End: 2019-07-24

## 2019-07-24 DIAGNOSIS — M51.36 DDD (DEGENERATIVE DISC DISEASE), LUMBAR: Primary | ICD-10-CM

## 2019-07-24 DIAGNOSIS — M54.50 LOW BACK PAIN WITHOUT SCIATICA, UNSPECIFIED BACK PAIN LATERALITY, UNSPECIFIED CHRONICITY: ICD-10-CM

## 2019-07-24 PROCEDURE — 97162 PT EVAL MOD COMPLEX 30 MIN: CPT

## 2019-07-24 PROCEDURE — 97530 THERAPEUTIC ACTIVITIES: CPT

## 2019-07-24 ASSESSMENT — PAIN SCALES - QUEBEC BACK PAIN DISABILITY SCALE
QUEBEC DISABILITY INDEX: 60-79%
BEND OVER TO CLEAN THE BATHTUB: 5
PULL OR PUSH HEAVY DOORS: 3
CARRY TWO BAGS OF GROCERIES: 5
RUN ONE BLOCK OR 100M: 5
WALK SEVERAL KILOMETERS  OR MILES: 5
THROW A BALL: 1
MAKE YOUR BED: 3
PUT ON SOCKS OR PANYHOSE: 1
TURN OVER IN BED: 2
STAND UP FOR 20 TO 30 MINUTES: 5
REACH UP TO HIGH SHELVES: 4
QUEBEC CMS MODIFIER: CL
RIDE IN A CAR: 3
MOVE A CHAIR: 3
CLIMB ONE FLIGHT OF STAIRS: 1
SIT IN A CHAIR FOR SEVERAL HOURS: 3
LIFT AND CARRY A HEAVY SUITCASE: 5
TOTAL SCORE: 66
TAKE FOOD OUT OF THE REFRIGERATOR: 2
GET OUT OF BED: 1
WALK A FEW BLOCKS OR 300 TO 400M: 4
SLEEP THROUGH THE NIGHT: 5

## 2019-07-24 ASSESSMENT — PAIN DESCRIPTION - LOCATION: LOCATION: BACK;KNEE

## 2019-07-24 ASSESSMENT — PAIN - FUNCTIONAL ASSESSMENT: PAIN_FUNCTIONAL_ASSESSMENT: PREVENTS OR INTERFERES WITH ALL ACTIVE AND SOME PASSIVE ACTIVITIES

## 2019-07-24 ASSESSMENT — PAIN DESCRIPTION - PROGRESSION: CLINICAL_PROGRESSION: GRADUALLY WORSENING

## 2019-07-24 ASSESSMENT — PAIN DESCRIPTION - ORIENTATION: ORIENTATION: LOWER

## 2019-07-24 NOTE — PROGRESS NOTES
Physical Therapy  Initial Assessment  Date: 2019  Patient Name: Gary Whipple  MRN: [id number]  : 1980     Treatment Diagnosis: Hypermobility, Poor Stabilization Strength    Restrictions  Restrictions/Precautions  Restrictions/Precautions: Fall Risk(moderate risk)    Subjective   General  Chart Reviewed: Yes  Patient assessed for rehabilitation services?: Yes  Additional Pertinent Hx: asthma, back pain, bipolar, cervical cancer, IBS, PTSD, vertigo  Referring Practitioner: Daily Taveras CNP  Referral Date : 19  Diagnosis: Lumbar DDD  PT Visit Information  Onset Date: (1 month ago)  PT Insurance Information: Renetta Parnell  Total # of Visits Approved: 12  Total # of Visits to Date: 1  Subjective  Subjective: Patient reports chronic history of LBP and knee pain. In the past month, patient had to start accupuncture back up to help with autonomic pain reaction. Patient states she was no longer getting relief from injections in the knee or LB. Notes occasionally she falls due to the neuropathy. The falls have aggravated her knee pain. Since starting accupuncture her autonomic pain reaction has improved tremendously. She has calmed down enough at this time that she thinks she can get some benefit from PT. Main c/o now are LBP and R  knee pain. Currently doing pilates reformer in fitness center with instructor. Pain Screening  Patient Currently in Pain: Yes  Pain Assessment  Pain Assessment: 0-10  Pain Level: (pain 4/10 at rest, 9/10 with activity)  Patient's Stated Pain Goal: 1(Pt goal: \"work on my core and balance. \")  Pain Location: Back;Knee  Pain Orientation: Lower  Pain Descriptors: Burning; Lutricia Ravinder; Shooting;Constant;Pins and needles  Clinical Progression: Gradually worsening  Functional Pain Assessment: Prevents or interferes with all active and some passive activities  Non-Pharmaceutical Pain Intervention(s): Rest  Vital Signs  Patient Currently in Pain: Yes    Objective Observation/Palpation  Posture: (slight increased lordosis)  Palpation: slight TTP B lumbar paraspinals  Observation: hypermobility noted in multiple joints throughout body    PROM RLE (degrees)  RLE PROM: WNL  PROM LLE (degrees)  LLE PROM: WNL  Spine  Lumbar: flex=WNL, all other motions=min decreased    Strength RLE  Strength RLE: (grossly 4/5 throughout)  Strength LLE  Strength LLE: WFL  Strength Other  Other: core strength = fair     Additional Measures  Flexibility: HS=WNL  Special Tests: clonus (-), hoffmans (-), SLR (-) L/R, heel-toe=WNL  Sensation  Overall Sensation Status: WNL  Romberg/Narrow Base of Support  Eyes Open: 30 seconds  Sway: Minimal  Eyes Closed: 30 seconds  Sway: Moderate  Strategy: Ankle  Sharpened Romberg/Tandem  Eyes Open: 30 seconds  Sway: Moderate  Strategy: Hip     Assessment   Conditions Requiring Skilled Therapeutic Intervention  Body structures, Functions, Activity limitations: Decreased functional mobility ; Decreased high-level IADLs;Decreased ADL status; Decreased ROM; Decreased strength; Increased Pain  Assessment: Patient reports to PT with hypermobility and poor stabilization strength which are limiting functional activities and would benefit from skilled PT.  (Prior Level of Function: able to walk without falling. )  Treatment Diagnosis: Hypermobility, Poor Stabilization Strength  Prognosis: Good  Decision Making: Medium Complexity  REQUIRES PT FOLLOW UP: Yes  Activity Tolerance  Activity Tolerance: Patient limited by pain         Plan   Plan  Times per week: 2x/week  Plan weeks: 6 weeks  Current Treatment Recommendations: Strengthening, Aquatics, ROM, Balance Training, Modalities, Pain Management, Home Exercise Program, Manual Therapy - Soft Tissue Mobilization    OutComes Score  [place] Total Score: 66 (07/24/19 7777)    Goals  Short term goals  Time Frame for Short term goals: 3 weeks  Short term goal 1: Patient will tolerate 45 mins of aquatic exercises without increased c/o

## 2019-07-24 NOTE — PLAN OF CARE
Outpatient Physical Therapy  [x] [facility]    Phone: [phone]   Fax: [phone]   [] [facility]  Phone: [phone]              Fax: [phone]  [] [facility]   Phone: [phone]   Fax: [phone]     To: Referring Practitioner: Don Gates CNP      Patient: Maximiliano Gillespie   : 1980   MRN: [id number]  Evaluation Date: 2019      Diagnosis Information:  · Diagnosis: Lumbar DDD   · Treatment Diagnosis: Hypermobility, Poor Stabilization Strength     Physical Therapy Certification/Re-Certification Form  Dear Don Gates CNP  The following patient has been evaluated for physical therapy services and for therapy to continue, Medicare requires monthly physician review of the treatment plan. Please review the attached evaluation and/or summary of the patient's plan of care, and verify that you agree therapy should continue by signing the attached document and sending it back to our office. Plan of Care/Treatment to date:  [x] Therapeutic Exercise    [x] Modalities:  [x] Therapeutic Activity     [x] Ultrasound  [x] Electrical Stimulation  [] Gait Training      [] Cervical Traction [] Lumbar Traction  [] Neuromuscular Re-education    [x] Cold/hotpack [] Iontophoresis   [x] Instruction in HEP     Other:  [x] Manual Therapy      []             [x] Aquatic Therapy      []           ? Frequency/Duration:  # Days per week: [] 1 day # Weeks: [] 1 week [] 5 weeks     [x] 2 days? [] 2 weeks [x] 6 weeks     [] 3 days   [] 3 weeks [] 7 weeks     [] 4 days   [] 4 weeks [] 8 weeks    Rehab Potential: [] Excellent [x] Good [] Fair  [] Poor       Electronically signed by:  Dian Armstrong PT , OMT-C,  [id number]        If you have any questions or concerns, please don't hesitate to call.   Thank you for your referral.      Physician Signature:________________________________Date:__________________  By signing above, therapists plan is approved by physician

## 2019-08-01 ENCOUNTER — HOSPITAL ENCOUNTER (OUTPATIENT)
Dept: PHYSICAL THERAPY | Age: 39
Setting detail: THERAPIES SERIES
Discharge: HOME | End: 2019-08-01
Payer: COMMERCIAL

## 2019-08-01 PROCEDURE — 97113 AQUATIC THERAPY/EXERCISES: CPT

## 2019-08-06 ENCOUNTER — APPOINTMENT (OUTPATIENT)
Dept: PHYSICAL THERAPY | Age: 39
End: 2019-08-06
Payer: COMMERCIAL

## 2019-08-08 ENCOUNTER — APPOINTMENT (OUTPATIENT)
Dept: PHYSICAL THERAPY | Age: 39
End: 2019-08-08
Payer: COMMERCIAL

## 2019-08-13 ENCOUNTER — HOSPITAL ENCOUNTER (OUTPATIENT)
Dept: PHYSICAL THERAPY | Age: 39
Setting detail: THERAPIES SERIES
Discharge: HOME | End: 2019-08-13
Payer: COMMERCIAL

## 2019-08-13 ENCOUNTER — OFFICE VISIT (OUTPATIENT)
Dept: PSYCHOLOGY | Age: 39
End: 2019-08-13
Payer: COMMERCIAL

## 2019-08-13 DIAGNOSIS — F43.10 PTSD (POST-TRAUMATIC STRESS DISORDER): Primary | ICD-10-CM

## 2019-08-13 PROCEDURE — 90832 PSYTX W PT 30 MINUTES: CPT | Performed by: PSYCHOLOGIST

## 2019-08-13 ASSESSMENT — PATIENT HEALTH QUESTIONNAIRE - PHQ9
9. THOUGHTS THAT YOU WOULD BE BETTER OFF DEAD, OR OF HURTING YOURSELF: 3
2. FEELING DOWN, DEPRESSED OR HOPELESS: 3
SUM OF ALL RESPONSES TO PHQ9 QUESTIONS 1 & 2: 6
4. FEELING TIRED OR HAVING LITTLE ENERGY: 3
1. LITTLE INTEREST OR PLEASURE IN DOING THINGS: 3
6. FEELING BAD ABOUT YOURSELF - OR THAT YOU ARE A FAILURE OR HAVE LET YOURSELF OR YOUR FAMILY DOWN: 3
7. TROUBLE CONCENTRATING ON THINGS, SUCH AS READING THE NEWSPAPER OR WATCHING TELEVISION: 3
5. POOR APPETITE OR OVEREATING: 3
3. TROUBLE FALLING OR STAYING ASLEEP: 3
8. MOVING OR SPEAKING SO SLOWLY THAT OTHER PEOPLE COULD HAVE NOTICED. OR THE OPPOSITE, BEING SO FIGETY OR RESTLESS THAT YOU HAVE BEEN MOVING AROUND A LOT MORE THAN USUAL: 3
SUM OF ALL RESPONSES TO PHQ QUESTIONS 1-9: 27
10. IF YOU CHECKED OFF ANY PROBLEMS, HOW DIFFICULT HAVE THESE PROBLEMS MADE IT FOR YOU TO DO YOUR WORK, TAKE CARE OF THINGS AT HOME, OR GET ALONG WITH OTHER PEOPLE: 3
SUM OF ALL RESPONSES TO PHQ QUESTIONS 1-9: 27

## 2019-08-13 ASSESSMENT — ANXIETY QUESTIONNAIRES
4. TROUBLE RELAXING: 3-NEARLY EVERY DAY
1. FEELING NERVOUS, ANXIOUS, OR ON EDGE: 3-NEARLY EVERY DAY
6. BECOMING EASILY ANNOYED OR IRRITABLE: 3-NEARLY EVERY DAY
5. BEING SO RESTLESS THAT IT IS HARD TO SIT STILL: 3-NEARLY EVERY DAY
3. WORRYING TOO MUCH ABOUT DIFFERENT THINGS: 3-NEARLY EVERY DAY
7. FEELING AFRAID AS IF SOMETHING AWFUL MIGHT HAPPEN: 3-NEARLY EVERY DAY
GAD7 TOTAL SCORE: 21
2. NOT BEING ABLE TO STOP OR CONTROL WORRYING: 3-NEARLY EVERY DAY

## 2019-08-15 ENCOUNTER — HOSPITAL ENCOUNTER (OUTPATIENT)
Dept: PHYSICAL THERAPY | Age: 39
Setting detail: THERAPIES SERIES
Discharge: HOME | End: 2019-08-15
Payer: COMMERCIAL

## 2019-08-15 PROCEDURE — 97150 GROUP THERAPEUTIC PROCEDURES: CPT

## 2019-08-15 PROCEDURE — 97113 AQUATIC THERAPY/EXERCISES: CPT

## 2019-08-15 NOTE — FLOWSHEET NOTE
Physical Therapy Aquatic Flow Sheet   Date:  8/15/2019    Patient Name:  Maximiliano Gillespie    :  1980     Restrictions/Precautions: Fall Risk(moderate risk)    Pertinent Medical History:  asthma, back pain, bipolar, cervical cancer, IBS, PTSD, vertigo     Medical/Treatment Diagnosis Information:  · Diagnosis: Lumbar DDD  · Treatment Diagnosis: Hypermobility, Poor Stabilization Strength     Insurance/Certification information:  PT Insurance Information: [employer]  Physician Information:  Referring Practitioner: Don Gates CNP  Plan of care signed (Y/N): sent for cosign       Visit# / total visits:  3/12  Pain level:      5/10  LBP                5/10 R knee           Progress Note: []  Yes  [x]  No  Next due by: Visit #10:      History of Injury:   Subjective: Patient reports chronic history of LBP and knee pain. In the past month, patient had to start accupuncture back up to help with autonomic pain reaction. Patient states she was no longer getting relief from injections in the knee or LB. Notes occasionally she falls due to the neuropathy. The falls have aggravated her knee pain. Since starting accupuncture her autonomic pain reaction has improved tremendously. She has calmed down enough at this time that she thinks she can get some benefit from PT. Main c/o now are LBP and R  knee pain. Currently doing pilates reformer in fitness center with instructor.        Subjective:   Did well after first session. Entire R side has pins and needles today. Had 2  to go to last week and missed her therapy and acupuncture appointments.  The acupuncture has been really helpful, so missing it has not been good    Objective:   Observation:  See eval   Test measurements:      Key  B= Belt DB= Dumbells T= Theratube   G=Gloves N= Noodles W= Weights   P= Paddles WW=Water Walker K= Kickboard        Transfers:  Steps ind      % Immersion: 75%            Ambulation:  laps ind Exercises UE:  B

## 2019-08-20 ENCOUNTER — HOSPITAL ENCOUNTER (OUTPATIENT)
Dept: PHYSICAL THERAPY | Age: 39
Setting detail: THERAPIES SERIES
Discharge: HOME | End: 2019-08-20
Payer: COMMERCIAL

## 2019-08-22 ENCOUNTER — HOSPITAL ENCOUNTER (OUTPATIENT)
Dept: PHYSICAL THERAPY | Age: 39
Setting detail: THERAPIES SERIES
Discharge: HOME | End: 2019-08-22
Payer: COMMERCIAL

## 2019-08-22 PROCEDURE — 97150 GROUP THERAPEUTIC PROCEDURES: CPT

## 2019-08-22 PROCEDURE — 97113 AQUATIC THERAPY/EXERCISES: CPT

## 2019-08-27 ENCOUNTER — HOSPITAL ENCOUNTER (OUTPATIENT)
Dept: PHYSICAL THERAPY | Age: 39
Setting detail: THERAPIES SERIES
Discharge: HOME | End: 2019-08-27
Payer: COMMERCIAL

## 2019-08-27 PROCEDURE — 97150 GROUP THERAPEUTIC PROCEDURES: CPT

## 2019-08-27 PROCEDURE — 97113 AQUATIC THERAPY/EXERCISES: CPT

## 2019-08-27 NOTE — FLOWSHEET NOTE
Retro   Rolling  10    Cariocas  Push Downs  10     IR/ER  10     Punching  10   Stretching:  Rowing  10   Gastroc/Soleus  [id number] Elbow Flex/Ext  10   Hamstring  9o12aup Shldr Flex/Ext  10   Knee flex stretch   Shldr Abd/Add  10   Piriformis   Horiz Abd/Add  10   Hip flexor    Arm Circles  10   SKTC   PNF Diagonals  10   DKTC  UE oscillations f/b, s/s    ITB   Wall Push-ups    Quad  Figure 8's    Mid back   Buoy pull/push downs    UT  Tband rows    Rhom  Tband lats    Post Shoulder  Lumbar Rot w/ paddles    Pec   Small ball pull downs                   diagonals             Cervical:       AROM Flex       AROM Extension     LEs exercises:  B AROM Side Bending    Marching  10 AROM Rotation    Heel Raises  10 Chin tucks    Toe Raises  10 Chin nods     Squats  10      Hamstring Curls  10      Hip Flexion  10 Balance:      Hip Abduction  10 SLS    Hip Circles  10 Tandem stance  30sec   TA set  10 NBOS eyes open    Glut Set  10 NBOS eyes closed    Hip Extension  Hand to Opposite Knee    Hip Adduction    Box Step     Hip IR   Noodle Stance     Hip ER  Stop/Go Gait    Fig 8's  Switch Gait                Seated: B Functional:    Ankle Pumps  20 Step up forward    Ankle circles  10 Step up lateral    Knee flex & ext  20 Step down    Hip Abd & Adduction  20 Pacific squats    Bicycle   20 Crate Lifts    Add Set with ball  10 Lunges forward    LX stab with med ball throws  Lunges lateral    Ankle INV  Lunges retro    Ankle EV  Lower ab curl with noodle      Upper ab curl with ball      Med ball straight lifts      Med ball diagonal lifts      Hydrorider          Noodle:      Leg Press  Deep Water:    Noodle hang at wall   Jog    Noodle hang deep water  Jumping Jacks    Noodle Bicycle  Heel to toe      Hand to opposite knee      Cross country skier      Rocking Horse      Goals:    Short term goals  Time Frame for Short term goals: 3 weeks  Short term goal 1: Patient will tolerate 45 mins of aquatic exercises without

## 2019-08-29 ENCOUNTER — HOSPITAL ENCOUNTER (OUTPATIENT)
Dept: PHYSICAL THERAPY | Age: 39
Setting detail: THERAPIES SERIES
Discharge: HOME | End: 2019-08-29
Payer: COMMERCIAL

## 2019-08-29 NOTE — FLOWSHEET NOTE
Physical Therapy  Cancellation/No-show Note  Patient Name:  Jovan Cash  :  1980   Date:  2019  Cancelled visits to date: 3  No-shows to date: 0    For today's appointment patient:  [x]  Cancelled  []  Rescheduled appointment  []  No-show     Reason given by patient:  []  Patient ill - asthma attack  []  Conflicting appointment  []  No transportation    []  Conflict with work  []  No reason given  [x]  Other:     Comments:     Taking  to ER  Electronically signed by:  Maverick Garcia PTA

## 2019-09-05 RX ORDER — MONTELUKAST SODIUM 10 MG/1
TABLET ORAL
Qty: 90 TABLET | Refills: 3 | Status: SHIPPED | OUTPATIENT
Start: 2019-09-05 | End: 2019-09-16 | Stop reason: SDUPTHER

## 2019-09-13 ENCOUNTER — OFFICE VISIT (OUTPATIENT)
Dept: PSYCHIATRY | Age: 39
End: 2019-09-13
Payer: COMMERCIAL

## 2019-09-13 VITALS
HEART RATE: 74 BPM | BODY MASS INDEX: 31.15 KG/M2 | SYSTOLIC BLOOD PRESSURE: 118 MMHG | WEIGHT: 193 LBS | DIASTOLIC BLOOD PRESSURE: 80 MMHG

## 2019-09-13 DIAGNOSIS — F41.9 ANXIETY: ICD-10-CM

## 2019-09-13 DIAGNOSIS — F60.3 BORDERLINE PERSONALITY DISORDER (HCC): Primary | ICD-10-CM

## 2019-09-13 DIAGNOSIS — F43.10 PTSD (POST-TRAUMATIC STRESS DISORDER): ICD-10-CM

## 2019-09-13 PROCEDURE — G8427 DOCREV CUR MEDS BY ELIG CLIN: HCPCS | Performed by: PSYCHIATRY & NEUROLOGY

## 2019-09-13 PROCEDURE — 1036F TOBACCO NON-USER: CPT | Performed by: PSYCHIATRY & NEUROLOGY

## 2019-09-13 PROCEDURE — 99214 OFFICE O/P EST MOD 30 MIN: CPT | Performed by: PSYCHIATRY & NEUROLOGY

## 2019-09-13 PROCEDURE — G8417 CALC BMI ABV UP PARAM F/U: HCPCS | Performed by: PSYCHIATRY & NEUROLOGY

## 2019-09-13 RX ORDER — DIAZEPAM 5 MG/1
5 TABLET ORAL DAILY PRN
Qty: 30 TABLET | Refills: 2 | Status: SHIPPED | OUTPATIENT
Start: 2019-09-13 | End: 2019-11-15 | Stop reason: SDUPTHER

## 2019-09-13 RX ORDER — FLUOXETINE HYDROCHLORIDE 40 MG/1
40 CAPSULE ORAL DAILY
Qty: 90 CAPSULE | Refills: 1 | Status: SHIPPED | OUTPATIENT
Start: 2019-09-13 | End: 2019-12-10 | Stop reason: SDUPTHER

## 2019-09-16 ENCOUNTER — OFFICE VISIT (OUTPATIENT)
Dept: INTERNAL MEDICINE CLINIC | Age: 39
End: 2019-09-16
Payer: COMMERCIAL

## 2019-09-16 VITALS
HEART RATE: 91 BPM | SYSTOLIC BLOOD PRESSURE: 110 MMHG | WEIGHT: 190 LBS | DIASTOLIC BLOOD PRESSURE: 76 MMHG | HEIGHT: 66 IN | OXYGEN SATURATION: 99 % | RESPIRATION RATE: 16 BRPM | BODY MASS INDEX: 30.53 KG/M2

## 2019-09-16 DIAGNOSIS — Z00.00 WELL FEMALE EXAM WITHOUT GYNECOLOGICAL EXAM: Primary | ICD-10-CM

## 2019-09-16 DIAGNOSIS — J45.40 MODERATE PERSISTENT ASTHMA WITHOUT COMPLICATION: ICD-10-CM

## 2019-09-16 DIAGNOSIS — E55.9 VITAMIN D DEFICIENCY: ICD-10-CM

## 2019-09-16 LAB
A/G RATIO: 2 (ref 1.1–2.2)
ALBUMIN SERPL-MCNC: 4.7 G/DL (ref 3.4–5)
ALP BLD-CCNC: 54 U/L (ref 40–129)
ALT SERPL-CCNC: 11 U/L (ref 10–40)
ANION GAP SERPL CALCULATED.3IONS-SCNC: 15 MMOL/L (ref 3–16)
AST SERPL-CCNC: 14 U/L (ref 15–37)
BASOPHILS ABSOLUTE: 0.1 K/UL (ref 0–0.2)
BASOPHILS RELATIVE PERCENT: 1 %
BILIRUB SERPL-MCNC: 0.3 MG/DL (ref 0–1)
BUN BLDV-MCNC: 11 MG/DL (ref 7–20)
CALCIUM SERPL-MCNC: 9.3 MG/DL (ref 8.3–10.6)
CHLORIDE BLD-SCNC: 108 MMOL/L (ref 99–110)
CHOLESTEROL, TOTAL: 201 MG/DL (ref 0–199)
CO2: 20 MMOL/L (ref 21–32)
CREAT SERPL-MCNC: 0.8 MG/DL (ref 0.6–1.1)
EOSINOPHILS ABSOLUTE: 0.5 K/UL (ref 0–0.6)
EOSINOPHILS RELATIVE PERCENT: 6.7 %
GFR AFRICAN AMERICAN: >60
GFR NON-AFRICAN AMERICAN: >60
GLOBULIN: 2.3 G/DL
GLUCOSE BLD-MCNC: 100 MG/DL (ref 70–99)
HCT VFR BLD CALC: 38.7 % (ref 36–48)
HDLC SERPL-MCNC: 56 MG/DL (ref 40–60)
HEMOGLOBIN: 12.8 G/DL (ref 12–16)
LDL CHOLESTEROL CALCULATED: 125 MG/DL
LYMPHOCYTES ABSOLUTE: 2.6 K/UL (ref 1–5.1)
LYMPHOCYTES RELATIVE PERCENT: 35.1 %
MCH RBC QN AUTO: 29.9 PG (ref 26–34)
MCHC RBC AUTO-ENTMCNC: 33.1 G/DL (ref 31–36)
MCV RBC AUTO: 90.5 FL (ref 80–100)
MONOCYTES ABSOLUTE: 0.3 K/UL (ref 0–1.3)
MONOCYTES RELATIVE PERCENT: 4.3 %
NEUTROPHILS ABSOLUTE: 3.9 K/UL (ref 1.7–7.7)
NEUTROPHILS RELATIVE PERCENT: 52.9 %
PDW BLD-RTO: 13.9 % (ref 12.4–15.4)
PLATELET # BLD: 256 K/UL (ref 135–450)
PMV BLD AUTO: 8.5 FL (ref 5–10.5)
POTASSIUM SERPL-SCNC: 4.5 MMOL/L (ref 3.5–5.1)
RBC # BLD: 4.28 M/UL (ref 4–5.2)
SODIUM BLD-SCNC: 143 MMOL/L (ref 136–145)
TOTAL PROTEIN: 7 G/DL (ref 6.4–8.2)
TRIGL SERPL-MCNC: 102 MG/DL (ref 0–150)
VITAMIN D 25-HYDROXY: 24.4 NG/ML
VLDLC SERPL CALC-MCNC: 20 MG/DL
WBC # BLD: 7.4 K/UL (ref 4–11)

## 2019-09-16 PROCEDURE — G0008 ADMIN INFLUENZA VIRUS VAC: HCPCS | Performed by: INTERNAL MEDICINE

## 2019-09-16 PROCEDURE — 36415 COLL VENOUS BLD VENIPUNCTURE: CPT | Performed by: INTERNAL MEDICINE

## 2019-09-16 PROCEDURE — 90670 PCV13 VACCINE IM: CPT | Performed by: INTERNAL MEDICINE

## 2019-09-16 PROCEDURE — 90471 IMMUNIZATION ADMIN: CPT | Performed by: INTERNAL MEDICINE

## 2019-09-16 PROCEDURE — G0009 ADMIN PNEUMOCOCCAL VACCINE: HCPCS | Performed by: INTERNAL MEDICINE

## 2019-09-16 PROCEDURE — 99395 PREV VISIT EST AGE 18-39: CPT | Performed by: INTERNAL MEDICINE

## 2019-09-16 PROCEDURE — 90686 IIV4 VACC NO PRSV 0.5 ML IM: CPT | Performed by: INTERNAL MEDICINE

## 2019-09-16 PROCEDURE — 90715 TDAP VACCINE 7 YRS/> IM: CPT | Performed by: INTERNAL MEDICINE

## 2019-09-16 RX ORDER — MONTELUKAST SODIUM 10 MG/1
10 TABLET ORAL NIGHTLY
Qty: 90 TABLET | Refills: 3 | Status: SHIPPED | OUTPATIENT
Start: 2019-09-16 | End: 2020-09-02

## 2019-09-16 RX ORDER — ALBUTEROL SULFATE 2.5 MG/3ML
SOLUTION RESPIRATORY (INHALATION)
Qty: 360 EACH | Refills: 3 | Status: SHIPPED | OUTPATIENT
Start: 2019-09-16 | End: 2021-06-21

## 2019-09-16 RX ORDER — ALBUTEROL SULFATE 90 UG/1
2 AEROSOL, METERED RESPIRATORY (INHALATION) EVERY 4 HOURS PRN
Qty: 1 INHALER | Refills: 12 | Status: SHIPPED | OUTPATIENT
Start: 2019-09-16 | End: 2021-06-21

## 2019-09-16 RX ORDER — BUDESONIDE AND FORMOTEROL FUMARATE DIHYDRATE 160; 4.5 UG/1; UG/1
AEROSOL RESPIRATORY (INHALATION)
Qty: 1 INHALER | Refills: 12 | Status: SHIPPED | OUTPATIENT
Start: 2019-09-16 | End: 2020-10-12

## 2019-09-16 ASSESSMENT — ENCOUNTER SYMPTOMS
CHOKING: 0
ALLERGIC/IMMUNOLOGIC NEGATIVE: 1
SHORTNESS OF BREATH: 0
WHEEZING: 0
COUGH: 0
EYES NEGATIVE: 1
STRIDOR: 0
COLOR CHANGE: 0
APNEA: 0
CHEST TIGHTNESS: 1
GASTROINTESTINAL NEGATIVE: 1

## 2019-09-16 NOTE — PROGRESS NOTES
Subjective:    cc:  Physical exam  Patient ID: Tim Hooks is a 41 y.o. female. HPI  Here for physical exam.  Seeing gyn. C/o significant allergy sx. Having hives. Swelling of face. Itching. Asthma worse latelyl. No cough. Using benedryl with some relief. Taking claritin intermittently. Having to usie nebulizer more often. Had been using flonase but stopped a while ago. Compliant with singulair. Review of Systems   Constitutional: Negative. HENT: Negative. Eyes: Negative. Respiratory: Positive for chest tightness. Negative for apnea, cough, choking, shortness of breath, wheezing and stridor. Cardiovascular: Negative. Gastrointestinal: Negative. Endocrine: Negative. Genitourinary: Negative. Musculoskeletal: Negative. Skin: Positive for rash. Negative for color change, pallor and wound. Allergic/Immunologic: Negative. Neurological: Negative. Hematological: Negative. Psychiatric/Behavioral: Negative. Allergies   Allergen Reactions    Amitriptyline      Bipolar reaction       Current Outpatient Medications:     FLUoxetine (PROZAC) 40 MG capsule, Take 1 capsule by mouth daily, Disp: 90 capsule, Rfl: 1    diazepam (VALIUM) 5 MG tablet, Take 1 tablet by mouth daily as needed for Anxiety for up to 90 days. , Disp: 30 tablet, Rfl: 2    montelukast (SINGULAIR) 10 MG tablet, TAKE ONE TABLET BY MOUTH ONCE NIGHTLY, Disp: 90 tablet, Rfl: 3    ibuprofen (ADVIL;MOTRIN) 800 MG tablet, Take 1 tablet by mouth every 8 hours as needed for Pain, Disp: 30 tablet, Rfl: 0    acetaminophen (APAP EXTRA STRENGTH) 500 MG tablet, Take 1 tablet by mouth every 6 hours as needed for Pain, Disp: 30 tablet, Rfl: 0    albuterol sulfate  (90 Base) MCG/ACT inhaler, INHALE TWO PUFFS BY MOUTH EVERY 6 HOURS AS NEEDED FOR WHEEZING, Disp: 1 Inhaler, Rfl: 11    albuterol (PROVENTIL) (2.5 MG/3ML) 0.083% nebulizer solution, INHALE ONE VIAL VIA NEBULIZATION BY MOUTH EVERY 6 HOURS Hydroxy; Future    Other orders  -     montelukast (SINGULAIR) 10 MG tablet;  Take 1 tablet by mouth nightly  -     Tdap (age 10y-63y) IM (Adacel)  -     INFLUENZA, QUADV, 3 YRS AND OLDER, IM PF, PREFILL SYR OR SDV, 0.5ML (AFLURIA QUADV, PF)  -     PREVNAR 13 IM (Pneumococcal conjugate vaccine 13-valent)              Vikash Michel MD

## 2019-09-16 NOTE — PROGRESS NOTES
Vaccine Information Sheet, \"Influenza - Inactivated\"  given to Susan Salvador, or parent/legal guardian of  Susan Salvador and verbalized understanding. Patient responses:    Have you ever had a reaction to a flu vaccine? No  Are you able to eat eggs without adverse effects? Yes  Do you have any current illness? No  Have you ever had Guillian Altha Syndrome? No    Flu vaccine given per order. Please see immunization tab.

## 2019-09-17 LAB — TSH SERPL DL<=0.05 MIU/L-ACNC: 2.03 UIU/ML (ref 0.27–4.2)

## 2019-10-22 ENCOUNTER — OFFICE VISIT (OUTPATIENT)
Dept: PSYCHOLOGY | Age: 39
End: 2019-10-22
Payer: COMMERCIAL

## 2019-10-22 DIAGNOSIS — F43.10 PTSD (POST-TRAUMATIC STRESS DISORDER): Primary | ICD-10-CM

## 2019-10-22 PROCEDURE — 90832 PSYTX W PT 30 MINUTES: CPT | Performed by: PSYCHOLOGIST

## 2019-10-22 ASSESSMENT — PATIENT HEALTH QUESTIONNAIRE - PHQ9
SUM OF ALL RESPONSES TO PHQ9 QUESTIONS 1 & 2: 6
6. FEELING BAD ABOUT YOURSELF - OR THAT YOU ARE A FAILURE OR HAVE LET YOURSELF OR YOUR FAMILY DOWN: 3
10. IF YOU CHECKED OFF ANY PROBLEMS, HOW DIFFICULT HAVE THESE PROBLEMS MADE IT FOR YOU TO DO YOUR WORK, TAKE CARE OF THINGS AT HOME, OR GET ALONG WITH OTHER PEOPLE: 3
9. THOUGHTS THAT YOU WOULD BE BETTER OFF DEAD, OR OF HURTING YOURSELF: 0
8. MOVING OR SPEAKING SO SLOWLY THAT OTHER PEOPLE COULD HAVE NOTICED. OR THE OPPOSITE, BEING SO FIGETY OR RESTLESS THAT YOU HAVE BEEN MOVING AROUND A LOT MORE THAN USUAL: 3
1. LITTLE INTEREST OR PLEASURE IN DOING THINGS: 3
SUM OF ALL RESPONSES TO PHQ QUESTIONS 1-9: 24
3. TROUBLE FALLING OR STAYING ASLEEP: 3
7. TROUBLE CONCENTRATING ON THINGS, SUCH AS READING THE NEWSPAPER OR WATCHING TELEVISION: 3
4. FEELING TIRED OR HAVING LITTLE ENERGY: 3
SUM OF ALL RESPONSES TO PHQ QUESTIONS 1-9: 24
5. POOR APPETITE OR OVEREATING: 3
2. FEELING DOWN, DEPRESSED OR HOPELESS: 3

## 2019-10-30 RX ORDER — PRAZOSIN HYDROCHLORIDE 2 MG/1
CAPSULE ORAL
Qty: 90 CAPSULE | Refills: 0 | Status: SHIPPED | OUTPATIENT
Start: 2019-10-30 | End: 2019-11-15 | Stop reason: SDUPTHER

## 2019-11-15 ENCOUNTER — OFFICE VISIT (OUTPATIENT)
Dept: PSYCHIATRY | Age: 39
End: 2019-11-15
Payer: COMMERCIAL

## 2019-11-15 VITALS
HEIGHT: 66 IN | HEART RATE: 85 BPM | DIASTOLIC BLOOD PRESSURE: 74 MMHG | WEIGHT: 198 LBS | BODY MASS INDEX: 31.82 KG/M2 | SYSTOLIC BLOOD PRESSURE: 110 MMHG

## 2019-11-15 DIAGNOSIS — Z86.59 HISTORY OF BIPOLAR DISORDER: ICD-10-CM

## 2019-11-15 DIAGNOSIS — F60.3 BORDERLINE PERSONALITY DISORDER (HCC): Primary | ICD-10-CM

## 2019-11-15 DIAGNOSIS — F43.10 PTSD (POST-TRAUMATIC STRESS DISORDER): ICD-10-CM

## 2019-11-15 DIAGNOSIS — F41.9 ANXIETY: ICD-10-CM

## 2019-11-15 PROCEDURE — 99214 OFFICE O/P EST MOD 30 MIN: CPT | Performed by: PSYCHIATRY & NEUROLOGY

## 2019-11-15 PROCEDURE — G8482 FLU IMMUNIZE ORDER/ADMIN: HCPCS | Performed by: PSYCHIATRY & NEUROLOGY

## 2019-11-15 PROCEDURE — G8417 CALC BMI ABV UP PARAM F/U: HCPCS | Performed by: PSYCHIATRY & NEUROLOGY

## 2019-11-15 PROCEDURE — 1036F TOBACCO NON-USER: CPT | Performed by: PSYCHIATRY & NEUROLOGY

## 2019-11-15 PROCEDURE — G8427 DOCREV CUR MEDS BY ELIG CLIN: HCPCS | Performed by: PSYCHIATRY & NEUROLOGY

## 2019-11-15 RX ORDER — PRAZOSIN HYDROCHLORIDE 2 MG/1
CAPSULE ORAL
Qty: 90 CAPSULE | Refills: 1 | Status: SHIPPED | OUTPATIENT
Start: 2019-11-15 | End: 2021-06-21

## 2019-11-15 RX ORDER — DIAZEPAM 5 MG/1
5 TABLET ORAL DAILY PRN
Qty: 30 TABLET | Refills: 1 | Status: SHIPPED | OUTPATIENT
Start: 2019-12-12 | End: 2021-06-09 | Stop reason: SDUPTHER

## 2019-12-10 ENCOUNTER — OFFICE VISIT (OUTPATIENT)
Dept: PSYCHIATRY | Age: 39
End: 2019-12-10
Payer: COMMERCIAL

## 2019-12-10 VITALS
HEIGHT: 66 IN | WEIGHT: 202 LBS | HEART RATE: 106 BPM | BODY MASS INDEX: 32.47 KG/M2 | DIASTOLIC BLOOD PRESSURE: 76 MMHG | SYSTOLIC BLOOD PRESSURE: 118 MMHG

## 2019-12-10 DIAGNOSIS — F60.3 BORDERLINE PERSONALITY DISORDER (HCC): Primary | ICD-10-CM

## 2019-12-10 DIAGNOSIS — F41.9 ANXIETY: ICD-10-CM

## 2019-12-10 DIAGNOSIS — F43.10 PTSD (POST-TRAUMATIC STRESS DISORDER): ICD-10-CM

## 2019-12-10 DIAGNOSIS — Z86.59 HISTORY OF BIPOLAR DISORDER: ICD-10-CM

## 2019-12-10 PROCEDURE — G8417 CALC BMI ABV UP PARAM F/U: HCPCS | Performed by: PSYCHIATRY & NEUROLOGY

## 2019-12-10 PROCEDURE — 1036F TOBACCO NON-USER: CPT | Performed by: PSYCHIATRY & NEUROLOGY

## 2019-12-10 PROCEDURE — G8482 FLU IMMUNIZE ORDER/ADMIN: HCPCS | Performed by: PSYCHIATRY & NEUROLOGY

## 2019-12-10 PROCEDURE — G8427 DOCREV CUR MEDS BY ELIG CLIN: HCPCS | Performed by: PSYCHIATRY & NEUROLOGY

## 2019-12-10 PROCEDURE — 99214 OFFICE O/P EST MOD 30 MIN: CPT | Performed by: PSYCHIATRY & NEUROLOGY

## 2019-12-10 RX ORDER — FLUOXETINE HYDROCHLORIDE 20 MG/1
60 CAPSULE ORAL DAILY
Qty: 270 CAPSULE | Refills: 1 | Status: SHIPPED | OUTPATIENT
Start: 2019-12-10 | End: 2021-06-09 | Stop reason: SDUPTHER

## 2019-12-13 ENCOUNTER — OFFICE VISIT (OUTPATIENT)
Dept: PSYCHOLOGY | Age: 39
End: 2019-12-13
Payer: COMMERCIAL

## 2019-12-13 DIAGNOSIS — F43.10 PTSD (POST-TRAUMATIC STRESS DISORDER): Primary | ICD-10-CM

## 2019-12-13 PROCEDURE — 90832 PSYTX W PT 30 MINUTES: CPT | Performed by: PSYCHOLOGIST

## 2019-12-13 ASSESSMENT — COLUMBIA-SUICIDE SEVERITY RATING SCALE - C-SSRS
2. HAVE YOU ACTUALLY HAD ANY THOUGHTS OF KILLING YOURSELF?: NO
6. HAVE YOU EVER DONE ANYTHING, STARTED TO DO ANYTHING, OR PREPARED TO DO ANYTHING TO END YOUR LIFE?: NO
1. WITHIN THE PAST MONTH, HAVE YOU WISHED YOU WERE DEAD OR WISHED YOU COULD GO TO SLEEP AND NOT WAKE UP?: NO

## 2019-12-13 ASSESSMENT — ANXIETY QUESTIONNAIRES
4. TROUBLE RELAXING: 3-NEARLY EVERY DAY
7. FEELING AFRAID AS IF SOMETHING AWFUL MIGHT HAPPEN: 3-NEARLY EVERY DAY
3. WORRYING TOO MUCH ABOUT DIFFERENT THINGS: 3-NEARLY EVERY DAY
6. BECOMING EASILY ANNOYED OR IRRITABLE: 3-NEARLY EVERY DAY
2. NOT BEING ABLE TO STOP OR CONTROL WORRYING: 3-NEARLY EVERY DAY
1. FEELING NERVOUS, ANXIOUS, OR ON EDGE: 3-NEARLY EVERY DAY
GAD7 TOTAL SCORE: 21
5. BEING SO RESTLESS THAT IT IS HARD TO SIT STILL: 3-NEARLY EVERY DAY

## 2019-12-13 ASSESSMENT — PATIENT HEALTH QUESTIONNAIRE - PHQ9
9. THOUGHTS THAT YOU WOULD BE BETTER OFF DEAD, OR OF HURTING YOURSELF: 0
1. LITTLE INTEREST OR PLEASURE IN DOING THINGS: 3
5. POOR APPETITE OR OVEREATING: 3
3. TROUBLE FALLING OR STAYING ASLEEP: 3
7. TROUBLE CONCENTRATING ON THINGS, SUCH AS READING THE NEWSPAPER OR WATCHING TELEVISION: 3
SUM OF ALL RESPONSES TO PHQ QUESTIONS 1-9: 24
10. IF YOU CHECKED OFF ANY PROBLEMS, HOW DIFFICULT HAVE THESE PROBLEMS MADE IT FOR YOU TO DO YOUR WORK, TAKE CARE OF THINGS AT HOME, OR GET ALONG WITH OTHER PEOPLE: 3
4. FEELING TIRED OR HAVING LITTLE ENERGY: 3
SUM OF ALL RESPONSES TO PHQ QUESTIONS 1-9: 24
6. FEELING BAD ABOUT YOURSELF - OR THAT YOU ARE A FAILURE OR HAVE LET YOURSELF OR YOUR FAMILY DOWN: 3
SUM OF ALL RESPONSES TO PHQ9 QUESTIONS 1 & 2: 6
2. FEELING DOWN, DEPRESSED OR HOPELESS: 3
8. MOVING OR SPEAKING SO SLOWLY THAT OTHER PEOPLE COULD HAVE NOTICED. OR THE OPPOSITE, BEING SO FIGETY OR RESTLESS THAT YOU HAVE BEEN MOVING AROUND A LOT MORE THAN USUAL: 3

## 2019-12-30 DIAGNOSIS — R11.0 CHRONIC NAUSEA: ICD-10-CM

## 2019-12-30 DIAGNOSIS — R42 VERTIGO: ICD-10-CM

## 2019-12-30 RX ORDER — ONDANSETRON 4 MG/1
TABLET, FILM COATED ORAL
Qty: 30 TABLET | Refills: 0 | Status: SHIPPED | OUTPATIENT
Start: 2019-12-30 | End: 2020-03-13

## 2020-03-10 RX ORDER — FLUOXETINE HYDROCHLORIDE 40 MG/1
CAPSULE ORAL
Qty: 90 CAPSULE | Refills: 0 | OUTPATIENT
Start: 2020-03-10

## 2020-03-16 RX ORDER — ONDANSETRON 4 MG/1
TABLET, FILM COATED ORAL
Qty: 30 TABLET | Refills: 2 | Status: SHIPPED | OUTPATIENT
Start: 2020-03-16 | End: 2021-06-21

## 2020-04-13 ENCOUNTER — TELEPHONE (OUTPATIENT)
Dept: PSYCHOLOGY | Age: 40
End: 2020-04-13

## 2020-04-13 NOTE — TELEPHONE ENCOUNTER
We want to confirm that, for purposes of billing, this is a virtual visit with your provider for which we will submit a claim for reimbursement with your insurance company. You will be responsible for any copays, coinsurance amounts or other amounts not covered by your insurance company. If you do not accept this, unfortunately we will not be able to schedule a virtual visit with the provider. Do you accept?   YES

## 2020-04-28 ENCOUNTER — VIRTUAL VISIT (OUTPATIENT)
Dept: PSYCHOLOGY | Age: 40
End: 2020-04-28
Payer: COMMERCIAL

## 2020-04-28 PROCEDURE — 90832 PSYTX W PT 30 MINUTES: CPT | Performed by: PSYCHOLOGIST

## 2020-04-28 NOTE — PROGRESS NOTES
well controlled  Affect    normal affect  Thought Content    intact  Thought Process    linear, goal directed and coherent  Associations    logical connections  Insight    Good  Judgment    Intact  Orientation    oriented to person, place, time, and general circumstances  Memory    recent and remote memory intact  Attention/Concentration    intact  Morbid ideation No  Suicide Assessment    no suicidal ideation      History:    Medications:   Current Outpatient Medications   Medication Sig Dispense Refill    ondansetron (ZOFRAN) 4 MG tablet TAKE ONE TABLET BY MOUTH EVERY 8 HOURS AS NEEDED FOR NAUSEA AND VOMITING 30 tablet 2    FLUoxetine (PROZAC) 20 MG capsule Take 3 capsules by mouth daily 270 capsule 1    prazosin (MINIPRESS) 2 MG capsule TAKE ONE CAPSULE BY MOUTH ONCE NIGHTLY 90 capsule 1    albuterol (PROVENTIL) (2.5 MG/3ML) 0.083% nebulizer solution INHALE ONE VIAL VIA NEBULIZATION BY MOUTH EVERY 6 HOURS AS NEEDED FOR SHORTNESS OF BREATH OR FOR WHEEZING 360 each 3    albuterol sulfate  (90 Base) MCG/ACT inhaler Inhale 2 puffs into the lungs every 4 hours as needed for Wheezing 1 Inhaler 12    budesonide-formoterol (SYMBICORT) 160-4.5 MCG/ACT AERO INHALE TWO PUFFS BY MOUTH TWICE A DAY 1 Inhaler 12    montelukast (SINGULAIR) 10 MG tablet Take 1 tablet by mouth nightly 90 tablet 3    ibuprofen (ADVIL;MOTRIN) 800 MG tablet Take 1 tablet by mouth every 8 hours as needed for Pain 30 tablet 0    acetaminophen (APAP EXTRA STRENGTH) 500 MG tablet Take 1 tablet by mouth every 6 hours as needed for Pain 30 tablet 0    meclizine (ANTIVERT) 12.5 MG tablet Take 1 tablet by mouth 3 times daily as needed for Dizziness 30 tablet 2    baclofen (LIORESAL) 10 MG tablet prn      VITAMIN D, ERGOCALCIFEROL, PO Take by mouth once a week      SUMAtriptan (IMITREX) 50 MG tablet Take 50 mg by mouth once as needed for Migraine       No current facility-administered medications for this visit.         Social History:

## 2020-05-18 ENCOUNTER — TELEPHONE (OUTPATIENT)
Dept: INTERNAL MEDICINE CLINIC | Age: 40
End: 2020-05-18

## 2020-05-18 NOTE — TELEPHONE ENCOUNTER
I have no way of knowing with out seeing it.   Can do a doxy call on wed or she can be seen at [place]

## 2020-05-18 NOTE — TELEPHONE ENCOUNTER
Patient says she has developed a bump that has gotten enlarged on her wrist for 1 month, tender to touch, patient says her boyfriend has the same thing. Is this contagious?

## 2020-05-20 ENCOUNTER — VIRTUAL VISIT (OUTPATIENT)
Dept: INTERNAL MEDICINE CLINIC | Age: 40
End: 2020-05-20
Payer: COMMERCIAL

## 2020-05-20 PROCEDURE — G8417 CALC BMI ABV UP PARAM F/U: HCPCS | Performed by: INTERNAL MEDICINE

## 2020-05-20 PROCEDURE — G8427 DOCREV CUR MEDS BY ELIG CLIN: HCPCS | Performed by: INTERNAL MEDICINE

## 2020-05-20 PROCEDURE — 1036F TOBACCO NON-USER: CPT | Performed by: INTERNAL MEDICINE

## 2020-05-20 PROCEDURE — 99212 OFFICE O/P EST SF 10 MIN: CPT | Performed by: INTERNAL MEDICINE

## 2020-05-20 ASSESSMENT — ENCOUNTER SYMPTOMS: COLOR CHANGE: 0

## 2020-05-20 NOTE — PROGRESS NOTES
visit: Yes    Total time spent for this encounter: 8 min    Services were provided through a video synchronous discussion virtually to substitute for in-person clinic visit. Patient and provider were located at their individual homes. --Silvia Winter MD on 5/20/2020 at 1:38 PM    An electronic signature was used to authenticate this note.     Silvia Winter

## 2020-09-02 RX ORDER — MONTELUKAST SODIUM 10 MG/1
TABLET ORAL
Qty: 90 TABLET | Refills: 3 | Status: SHIPPED | OUTPATIENT
Start: 2020-09-02 | End: 2021-06-21

## 2020-10-12 RX ORDER — BUDESONIDE AND FORMOTEROL FUMARATE DIHYDRATE 160; 4.5 UG/1; UG/1
AEROSOL RESPIRATORY (INHALATION)
Qty: 10.2 G | Refills: 12 | Status: SHIPPED | OUTPATIENT
Start: 2020-10-12 | End: 2021-06-21

## 2020-10-12 NOTE — TELEPHONE ENCOUNTER
lov 5/20/20  Future Appointments   Date Time Provider Mina Edwards   10/23/2020  2:00 PM [place], [place] [facility]

## 2021-03-29 ENCOUNTER — TELEPHONE (OUTPATIENT)
Dept: FAMILY MEDICINE CLINIC | Age: 41
End: 2021-03-29

## 2021-03-29 NOTE — TELEPHONE ENCOUNTER
PT called in stating that she received a letter that Dr. Teo Garcia is retiring and she sees Dr. Ashanti Brand as well. PT would like to know if she is able to establish here in order to continue her care with Dr. Ashanti Brand.      Please call back to adv: [phone]

## 2021-03-30 NOTE — TELEPHONE ENCOUNTER
Dr Gala Hanna will no longer be at this office in may. She will be at [address] [place] [address].  Perhaps [place]

## 2021-03-30 NOTE — TELEPHONE ENCOUNTER
I'm not sure how this works when patient see's Dr. Zara Jaffe. Are any of you willing to take as new patient?

## 2021-05-19 ENCOUNTER — IMMUNIZATION (OUTPATIENT)
Dept: PRIMARY CARE CLINIC | Age: 41
End: 2021-05-19
Payer: COMMERCIAL

## 2021-05-19 PROCEDURE — 91300 COVID-19, PFIZER VACCINE 30MCG/0.3ML DOSE: CPT | Performed by: FAMILY MEDICINE

## 2021-05-19 PROCEDURE — 0002A COVID-19, PFIZER VACCINE 30MCG/0.3ML DOSE: CPT | Performed by: FAMILY MEDICINE

## 2021-06-09 ENCOUNTER — OFFICE VISIT (OUTPATIENT)
Dept: INTERNAL MEDICINE CLINIC | Age: 41
End: 2021-06-09
Payer: MEDICARE

## 2021-06-09 VITALS
SYSTOLIC BLOOD PRESSURE: 108 MMHG | BODY MASS INDEX: 32.62 KG/M2 | DIASTOLIC BLOOD PRESSURE: 68 MMHG | WEIGHT: 203 LBS | RESPIRATION RATE: 16 BRPM | HEIGHT: 66 IN | OXYGEN SATURATION: 97 % | HEART RATE: 92 BPM

## 2021-06-09 DIAGNOSIS — Z72.89 DELIBERATE SELF-CUTTING: ICD-10-CM

## 2021-06-09 DIAGNOSIS — F60.3 BORDERLINE PERSONALITY DISORDER (HCC): ICD-10-CM

## 2021-06-09 DIAGNOSIS — F41.9 ANXIETY: ICD-10-CM

## 2021-06-09 DIAGNOSIS — F43.10 PTSD (POST-TRAUMATIC STRESS DISORDER): ICD-10-CM

## 2021-06-09 DIAGNOSIS — F31.9 BIPOLAR AFFECTIVE DISORDER, REMISSION STATUS UNSPECIFIED (HCC): Primary | ICD-10-CM

## 2021-06-09 PROCEDURE — 1036F TOBACCO NON-USER: CPT | Performed by: INTERNAL MEDICINE

## 2021-06-09 PROCEDURE — 99214 OFFICE O/P EST MOD 30 MIN: CPT | Performed by: INTERNAL MEDICINE

## 2021-06-09 PROCEDURE — G8417 CALC BMI ABV UP PARAM F/U: HCPCS | Performed by: INTERNAL MEDICINE

## 2021-06-09 PROCEDURE — G8427 DOCREV CUR MEDS BY ELIG CLIN: HCPCS | Performed by: INTERNAL MEDICINE

## 2021-06-09 RX ORDER — FLUOXETINE HYDROCHLORIDE 20 MG/1
60 CAPSULE ORAL DAILY
Qty: 270 CAPSULE | Refills: 1 | Status: SHIPPED | OUTPATIENT
Start: 2021-06-09 | End: 2021-06-22 | Stop reason: SDUPTHER

## 2021-06-09 RX ORDER — DIAZEPAM 5 MG/1
5 TABLET ORAL DAILY PRN
Qty: 30 TABLET | Refills: 0 | Status: SHIPPED | OUTPATIENT
Start: 2021-06-09 | End: 2021-09-07

## 2021-06-09 SDOH — ECONOMIC STABILITY: FOOD INSECURITY: WITHIN THE PAST 12 MONTHS, THE FOOD YOU BOUGHT JUST DIDN'T LAST AND YOU DIDN'T HAVE MONEY TO GET MORE.: SOMETIMES TRUE

## 2021-06-09 SDOH — ECONOMIC STABILITY: FOOD INSECURITY: WITHIN THE PAST 12 MONTHS, YOU WORRIED THAT YOUR FOOD WOULD RUN OUT BEFORE YOU GOT MONEY TO BUY MORE.: SOMETIMES TRUE

## 2021-06-09 ASSESSMENT — ENCOUNTER SYMPTOMS
SHORTNESS OF BREATH: 0
COUGH: 0

## 2021-06-09 ASSESSMENT — SOCIAL DETERMINANTS OF HEALTH (SDOH): HOW HARD IS IT FOR YOU TO PAY FOR THE VERY BASICS LIKE FOOD, HOUSING, MEDICAL CARE, AND HEATING?: HARD
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
## 2021-06-09 NOTE — PROGRESS NOTES
Subjective:      Patient ID: Norberto Funez is a 45 y.o. female. HPI  Had been seeing esa for  For ptsd and borderline personality disorder. Doing really well.  esa moved back to . Had been doing well. Pt got very overwhelmed with covid and ran out of meds and did not rf any. Needs to get back on her meds. Has been cutting self. Last episode 2 months ago. Knows she needs to get better. Also dxed with bipolar. Had been on prozac and occasional valium for sleep and night terrors. Review of Systems   Respiratory: Negative for cough and shortness of breath. Cardiovascular: Negative for chest pain, palpitations and leg swelling. Neurological: Negative for syncope and light-headedness. Psychiatric/Behavioral: Positive for dysphoric mood and self-injury. Negative for decreased concentration, sleep disturbance and suicidal ideas. The patient is nervous/anxious. The patient is not hyperactive. Prior to Visit Medications    Medication Sig Taking?  Authorizing Provider   budesonide-formoterol (SYMBICORT) 160-4.5 MCG/ACT AERO INHALE TWO PUFFS BY MOUTH TWICE A DAY  Patient not taking: Reported on 6/9/2021  Ramu Ames MD   montelukast (SINGULAIR) 10 MG tablet TAKE ONE TABLET BY MOUTH ONCE NIGHTLY  Patient not taking: Reported on 6/9/2021  Ramu Ames MD   ondansetron (ZOFRAN) 4 MG tablet TAKE ONE TABLET BY MOUTH EVERY 8 HOURS AS NEEDED FOR NAUSEA AND VOMITING  Patient not taking: Reported on 6/9/2021  Ramu Ames MD   FLUoxetine (PROZAC) 20 MG capsule Take 3 capsules by mouth daily  Patient not taking: Reported on 6/9/2021  Fabiola Salguero MD   prazosin (MINIPRESS) 2 MG capsule TAKE ONE CAPSULE BY MOUTH ONCE NIGHTLY  Patient not taking: Reported on 6/9/2021  Fabiola Salguero MD   albuterol (PROVENTIL) (2.5 MG/3ML) 0.083% nebulizer solution INHALE ONE VIAL VIA NEBULIZATION BY MOUTH EVERY 6 HOURS AS NEEDED FOR SHORTNESS OF BREATH OR FOR WHEEZING  Patient not taking: Reported on 6/9/2021  Polo Olson MD   albuterol sulfate  (90 Base) MCG/ACT inhaler Inhale 2 puffs into the lungs every 4 hours as needed for Wheezing  Patient not taking: Reported on 6/9/2021  Polo Olson MD   ibuprofen (ADVIL;MOTRIN) 800 MG tablet Take 1 tablet by mouth every 8 hours as needed for Pain  Patient not taking: Reported on 6/9/2021  Socorro Scale, PA   acetaminophen (APAP EXTRA STRENGTH) 500 MG tablet Take 1 tablet by mouth every 6 hours as needed for Pain  Patient not taking: Reported on 6/9/2021  Socorro JR Gregg   meclizine (ANTIVERT) 12.5 MG tablet Take 1 tablet by mouth 3 times daily as needed for Dizziness  Patient not taking: Reported on 6/9/2021  RACHELL Dubon - CNP   baclofen (LIORESAL) 10 MG tablet prn  Patient not taking: Reported on 6/9/2021  Historical Provider, MD   VITAMIN D, ERGOCALCIFEROL, PO Take by mouth once a week  Patient not taking: Reported on 6/9/2021  Historical Provider, MD   SUMAtriptan (IMITREX) 50 MG tablet Take 50 mg by mouth once as needed for Migraine  Patient not taking: Reported on 6/9/2021  Historical Provider, MD     /68 (Site: Right Upper Arm, Position: Sitting, Cuff Size: Medium Adult)   Pulse 92   Resp 16   Ht 5' 6\" (1.676 m)   Wt 203 lb (92.1 kg)   LMP 02/02/2016   SpO2 97%   Breastfeeding No   BMI 32.77 kg/m²       Objective:   Physical Exam  Constitutional:       General: She is not in acute distress. Appearance: She is well-developed and normal weight. Neck:      Vascular: No JVD. Comments: No bruits  Cardiovascular:      Rate and Rhythm: Normal rate and regular rhythm. Heart sounds: Normal heart sounds. No murmur heard. No friction rub. No gallop. Pulmonary:      Effort: Pulmonary effort is normal. No respiratory distress. Breath sounds: Normal breath sounds. No wheezing, rhonchi or rales. Musculoskeletal:      Right lower leg: No edema. Left lower leg: No edema. Skin:     General: Skin is warm. Findings: No erythema. Comments: Evidence of recent cutting left forearm. Healing well. Neurological:      Mental Status: She is alert and oriented to person, place, and time. Psychiatric:         Mood and Affect: Mood normal.         Behavior: Behavior normal.         Thought Content: Thought content normal.         Judgment: Judgment normal.         Assessment:      1. Bipolar affective disorder, remission status unspecified ([facility]    2. Borderline personality disorder ([facility]    3. Deliberate self-cutting    4. PTSD (post-traumatic stress disorder)            Plan:      Lilian Spence was seen today for medication refill. Diagnoses and all orders for this visit:    Bipolar affective disorder, remission status unspecified ([facility]):  Restart prozac, 1 pill qd x 1 wk then 2 po qd x 1 wk then 3 qd,  Valium prn, refer to [place] and Cris Green    Borderline personality disorder [facility]):  As above    Deliberate self-cutting:  Not currently active, as above    PTSD (post-traumatic stress disorder):  As above    Anxiety:  Asa lizbeth  -     diazePAM (VALIUM) 5 MG tablet; Take 1 tablet by mouth daily as needed for Anxiety for up to 90 days. Other orders  -     FLUoxetine (PROZAC) 20 MG capsule; Take 3 capsules by mouth daily      Controlled Substance Monitoring:    Acute and Chronic Pain Monitoring:   RX Monitoring 12/10/2019   Attestation -   Periodic Controlled Substance Monitoring No signs of potential drug abuse or diversion identified.    unable to access monitoring system today            Jose Raul Sandoval MD

## 2021-06-10 ENCOUNTER — OFFICE VISIT (OUTPATIENT)
Dept: PSYCHIATRY | Age: 41
End: 2021-06-10
Payer: MEDICARE

## 2021-06-10 DIAGNOSIS — F60.3 BORDERLINE PERSONALITY DISORDER (HCC): Primary | ICD-10-CM

## 2021-06-10 PROCEDURE — 99202 OFFICE O/P NEW SF 15 MIN: CPT | Performed by: NURSE PRACTITIONER

## 2021-06-10 PROCEDURE — G8417 CALC BMI ABV UP PARAM F/U: HCPCS | Performed by: NURSE PRACTITIONER

## 2021-06-10 PROCEDURE — G8427 DOCREV CUR MEDS BY ELIG CLIN: HCPCS | Performed by: NURSE PRACTITIONER

## 2021-06-10 PROCEDURE — 1036F TOBACCO NON-USER: CPT | Performed by: NURSE PRACTITIONER

## 2021-06-10 ASSESSMENT — PATIENT HEALTH QUESTIONNAIRE - PHQ9
4. FEELING TIRED OR HAVING LITTLE ENERGY: 3
10. IF YOU CHECKED OFF ANY PROBLEMS, HOW DIFFICULT HAVE THESE PROBLEMS MADE IT FOR YOU TO DO YOUR WORK, TAKE CARE OF THINGS AT HOME, OR GET ALONG WITH OTHER PEOPLE: 3
2. FEELING DOWN, DEPRESSED OR HOPELESS: 3
SUM OF ALL RESPONSES TO PHQ QUESTIONS 1-9: 24
7. TROUBLE CONCENTRATING ON THINGS, SUCH AS READING THE NEWSPAPER OR WATCHING TELEVISION: 3
6. FEELING BAD ABOUT YOURSELF - OR THAT YOU ARE A FAILURE OR HAVE LET YOURSELF OR YOUR FAMILY DOWN: 3
3. TROUBLE FALLING OR STAYING ASLEEP: 3
9. THOUGHTS THAT YOU WOULD BE BETTER OFF DEAD, OR OF HURTING YOURSELF: 3
8. MOVING OR SPEAKING SO SLOWLY THAT OTHER PEOPLE COULD HAVE NOTICED. OR THE OPPOSITE, BEING SO FIGETY OR RESTLESS THAT YOU HAVE BEEN MOVING AROUND A LOT MORE THAN USUAL: 3
5. POOR APPETITE OR OVEREATING: 3
SUM OF ALL RESPONSES TO PHQ9 QUESTIONS 1 & 2: 6
SUM OF ALL RESPONSES TO PHQ QUESTIONS 1-9: 27
SUM OF ALL RESPONSES TO PHQ QUESTIONS 1-9: 27
1. LITTLE INTEREST OR PLEASURE IN DOING THINGS: 3

## 2021-06-10 ASSESSMENT — ANXIETY QUESTIONNAIRES
3. WORRYING TOO MUCH ABOUT DIFFERENT THINGS: 3-NEARLY EVERY DAY
4. TROUBLE RELAXING: 3-NEARLY EVERY DAY
6. BECOMING EASILY ANNOYED OR IRRITABLE: 3-NEARLY EVERY DAY
2. NOT BEING ABLE TO STOP OR CONTROL WORRYING: 3-NEARLY EVERY DAY
1. FEELING NERVOUS, ANXIOUS, OR ON EDGE: 3-NEARLY EVERY DAY
7. FEELING AFRAID AS IF SOMETHING AWFUL MIGHT HAPPEN: 3-NEARLY EVERY DAY
5. BEING SO RESTLESS THAT IT IS HARD TO SIT STILL: 3-NEARLY EVERY DAY
GAD7 TOTAL SCORE: 21

## 2021-06-21 NOTE — PROGRESS NOTES
6.4 oz (93.2 kg)   06/09/21 203 lb (92.1 kg)   12/10/19 202 lb (91.6 kg)       Physical Exam  Vitals reviewed. Constitutional:       General: She is not in acute distress. Appearance: Normal appearance. She is well-developed. She is obese. She is not ill-appearing. HENT:      Head: Normocephalic and atraumatic. Right Ear: Tympanic membrane, ear canal and external ear normal. There is no impacted cerumen. Left Ear: Tympanic membrane, ear canal and external ear normal. There is no impacted cerumen. Nose: Nose normal. No congestion. Mouth/Throat:      Mouth: Mucous membranes are moist.      Pharynx: Oropharynx is clear. No oropharyngeal exudate or posterior oropharyngeal erythema. Eyes:      General: No scleral icterus. Right eye: No discharge. Left eye: No discharge. Extraocular Movements: Extraocular movements intact. Conjunctiva/sclera: Conjunctivae normal.      Pupils: Pupils are equal, round, and reactive to light. Cardiovascular:      Rate and Rhythm: Normal rate and regular rhythm. Pulses: Normal pulses. Heart sounds: Normal heart sounds. No murmur heard. Comments: Radial and pedal pulses intact  Pulmonary:      Effort: Pulmonary effort is normal. No respiratory distress. Breath sounds: Normal breath sounds. No stridor. No wheezing, rhonchi or rales. Chest:      Chest wall: No tenderness. Abdominal:      General: Bowel sounds are normal.      Palpations: Abdomen is soft. There is no mass. Tenderness: There is no abdominal tenderness. There is no right CVA tenderness, left CVA tenderness, guarding or rebound. Hernia: No hernia is present. Comments: Normal liver and spleen. No organomegaly   Musculoskeletal:         General: No tenderness. Normal range of motion. Cervical back: Normal range of motion and neck supple. Right lower leg: No edema. Left lower leg: No edema.       Comments: Intact in all extremities   Lymphadenopathy:      Cervical: No cervical adenopathy. Skin:     General: Skin is warm. Capillary Refill: Capillary refill takes less than 2 seconds. Findings: No erythema or rash. Comments: Healed cutting scars   Neurological:      General: No focal deficit present. Mental Status: She is alert and oriented to person, place, and time. Mental status is at baseline. Cranial Nerves: No cranial nerve deficit. Sensory: No sensory deficit. Motor: No weakness or abnormal muscle tone. Coordination: Coordination normal.      Gait: Gait normal.      Deep Tendon Reflexes:      Reflex Scores:       Patellar reflexes are 0 on the right side and 0 on the left side. Psychiatric:         Mood and Affect: Mood normal.         Behavior: Behavior normal.         Thought Content: Thought content normal.         Judgment: Judgment normal.         Lab Results   Component Value Date    WBC 7.4 09/16/2019    HGB 12.8 09/16/2019    HCT 38.7 09/16/2019    MCV 90.5 09/16/2019     09/16/2019     Lab Results   Component Value Date     09/16/2019    K 4.5 09/16/2019     09/16/2019    CO2 20 (L) 09/16/2019    BUN 11 09/16/2019    CREATININE 0.8 09/16/2019    GLUCOSE 100 (H) 09/16/2019    CALCIUM 9.3 09/16/2019    PROT 7.0 09/16/2019    LABALBU 4.7 09/16/2019    BILITOT 0.3 09/16/2019    ALKPHOS 54 09/16/2019    AST 14 (L) 09/16/2019    ALT 11 09/16/2019    LABGLOM >60 09/16/2019    GFRAA >60 09/16/2019    AGRATIO 2.0 09/16/2019    GLOB 2.3 09/16/2019     Lab Results   Component Value Date    CHOL 201 (H) 09/16/2019     Lab Results   Component Value Date    TRIG 102 09/16/2019     Lab Results   Component Value Date    HDL 56 09/16/2019     Lab Results   Component Value Date    LDLCALC 125 (H) 09/16/2019     Lab Results   Component Value Date    LABVLDL 20 09/16/2019     No results found for: LABA1C      ASSESSMENT/PLAN:  1.  Encounter to establish care  VS reviewed and WNL BMI reviewed   All questions answered. F/u discussed. Healthy lifestyle modifications discussed. - Comprehensive Metabolic Panel; Future  - Lipid Panel; Future  - Vitamin D 25 Hydroxy; Future    2. Vertigo  Refill   - meclizine (ANTIVERT) 12.5 MG tablet; Take 1 tablet by mouth 3 times daily as needed for Dizziness  Dispense: 30 tablet; Refill: 2  - ondansetron (ZOFRAN) 4 MG tablet; TAKE ONE TABLET BY MOUTH EVERY 8 HOURS AS NEEDED FOR NAUSEA AND VOMITING  Dispense: 30 tablet; Refill: 0    3. Chronic nausea  refill  - ondansetron (ZOFRAN) 4 MG tablet; TAKE ONE TABLET BY MOUTH EVERY 8 HOURS AS NEEDED FOR NAUSEA AND VOMITING  Dispense: 30 tablet; Refill: 0    4. Moderate persistent asthma without complication  Refills rescue and neb meds. Will try a different controller med and she will MyChart me to check in. Will also start treating her allergies again  Will consider singulair in the near future  - albuterol sulfate  (90 Base) MCG/ACT inhaler; Inhale 2 puffs into the lungs every 4 hours as needed for Wheezing  Dispense: 1 Inhaler; Refill: 12  - albuterol (PROVENTIL) (2.5 MG/3ML) 0.083% nebulizer solution; INHALE ONE VIAL VIA NEBULIZATION BY MOUTH EVERY 6 HOURS AS NEEDED FOR SHORTNESS OF BREATH OR FOR WHEEZING  Dispense: 360 each; Refill: 3  - fluticasone-salmeterol (ADVAIR DISKUS) 500-50 MCG/DOSE diskus inhaler; Inhale 1 puff into the lungs every 12 hours  Dispense: 180 each; Refill: 3  - loratadine (CLARITIN) 10 MG tablet; Take 1 tablet by mouth daily  Dispense: 90 tablet; Refill: 1    5. Bipolar affective disorder, remission status unspecified ([facility]  Refilled  Will follow up with [address] [place] [address] John  Will also follow up with me if there is a concern regarding dose or medication  - FLUoxetine (PROZAC) 20 MG capsule; Take 3 capsules by mouth daily  Dispense: 270 capsule; Refill: 1    6. Borderline personality disorder ([facility]  As above  - FLUoxetine (PROZAC) 20 MG capsule;  Take 3 capsules by mouth daily  Dispense: 270 capsule; Refill: 1    7. Migraine without status migrainosus, not intractable, unspecified migraine type  refilled  - SUMAtriptan (IMITREX) 50 MG tablet; Take 1 tablet by mouth once as needed for Migraine  Dispense: 9 tablet; Refill: 1    8. Allergic rhinitis, unspecified seasonality, unspecified trigger  refilled  - loratadine (CLARITIN) 10 MG tablet; Take 1 tablet by mouth daily  Dispense: 90 tablet; Refill: 1    9. Vitamin D deficiency  Update and treat accordingly   - Vitamin D 25 Hydroxy; Future    10. Mixed hyperlipidemia  Update  - Lipid Panel; Future        Discussed use, benefit, and side effects of prescribed medications. Barriers to medication compliance addressed. All patient questions answered. Pt voiced understanding. RTC Return in about 6 months (around 12/22/2021).     Future Appointments   Date Time Provider Mina Edwards   7/1/2021  3:30 PM Leyda Zamorano [facility]   7/7/2021  1:40 PM Nils Estrada MD [facility]   12/15/2021  3:30 PM MD Angela Silva MD  6/22/2021  5:19 PM

## 2021-06-22 ENCOUNTER — OFFICE VISIT (OUTPATIENT)
Dept: PRIMARY CARE CLINIC | Age: 41
End: 2021-06-22
Payer: MEDICAID

## 2021-06-22 VITALS
BODY MASS INDEX: 33.15 KG/M2 | SYSTOLIC BLOOD PRESSURE: 108 MMHG | TEMPERATURE: 97.7 F | WEIGHT: 205.4 LBS | DIASTOLIC BLOOD PRESSURE: 76 MMHG | RESPIRATION RATE: 16 BRPM | HEART RATE: 85 BPM

## 2021-06-22 DIAGNOSIS — G43.909 MIGRAINE WITHOUT STATUS MIGRAINOSUS, NOT INTRACTABLE, UNSPECIFIED MIGRAINE TYPE: ICD-10-CM

## 2021-06-22 DIAGNOSIS — Z76.89 ENCOUNTER TO ESTABLISH CARE: Primary | ICD-10-CM

## 2021-06-22 DIAGNOSIS — R42 VERTIGO: ICD-10-CM

## 2021-06-22 DIAGNOSIS — E55.9 VITAMIN D DEFICIENCY: ICD-10-CM

## 2021-06-22 DIAGNOSIS — R11.0 CHRONIC NAUSEA: ICD-10-CM

## 2021-06-22 DIAGNOSIS — E78.2 MIXED HYPERLIPIDEMIA: ICD-10-CM

## 2021-06-22 DIAGNOSIS — F31.9 BIPOLAR AFFECTIVE DISORDER, REMISSION STATUS UNSPECIFIED (HCC): ICD-10-CM

## 2021-06-22 DIAGNOSIS — J30.9 ALLERGIC RHINITIS, UNSPECIFIED SEASONALITY, UNSPECIFIED TRIGGER: ICD-10-CM

## 2021-06-22 DIAGNOSIS — J45.40 MODERATE PERSISTENT ASTHMA WITHOUT COMPLICATION: ICD-10-CM

## 2021-06-22 DIAGNOSIS — F60.3 BORDERLINE PERSONALITY DISORDER (HCC): ICD-10-CM

## 2021-06-22 PROCEDURE — G8427 DOCREV CUR MEDS BY ELIG CLIN: HCPCS | Performed by: FAMILY MEDICINE

## 2021-06-22 PROCEDURE — 1036F TOBACCO NON-USER: CPT | Performed by: FAMILY MEDICINE

## 2021-06-22 PROCEDURE — 99204 OFFICE O/P NEW MOD 45 MIN: CPT | Performed by: FAMILY MEDICINE

## 2021-06-22 PROCEDURE — G8417 CALC BMI ABV UP PARAM F/U: HCPCS | Performed by: FAMILY MEDICINE

## 2021-06-22 RX ORDER — LORATADINE 10 MG/1
10 TABLET ORAL DAILY
Qty: 90 TABLET | Refills: 1 | Status: SHIPPED | OUTPATIENT
Start: 2021-06-22 | End: 2022-04-20 | Stop reason: SDUPTHER

## 2021-06-22 RX ORDER — ALBUTEROL SULFATE 2.5 MG/3ML
SOLUTION RESPIRATORY (INHALATION)
Qty: 360 EACH | Refills: 3 | Status: SHIPPED | OUTPATIENT
Start: 2021-06-22 | End: 2021-09-09

## 2021-06-22 RX ORDER — SUMATRIPTAN 50 MG/1
50 TABLET, FILM COATED ORAL
Qty: 9 TABLET | Refills: 1 | Status: SHIPPED | OUTPATIENT
Start: 2021-06-22 | End: 2021-09-14 | Stop reason: SDUPTHER

## 2021-06-22 RX ORDER — ONDANSETRON 4 MG/1
TABLET, FILM COATED ORAL
Qty: 30 TABLET | Refills: 0 | Status: SHIPPED | OUTPATIENT
Start: 2021-06-22 | End: 2021-09-14 | Stop reason: SDUPTHER

## 2021-06-22 RX ORDER — ALBUTEROL SULFATE 90 UG/1
2 AEROSOL, METERED RESPIRATORY (INHALATION) EVERY 4 HOURS PRN
Qty: 1 INHALER | Refills: 12 | Status: SHIPPED | OUTPATIENT
Start: 2021-06-22 | End: 2021-09-09

## 2021-06-22 RX ORDER — MECLIZINE HCL 12.5 MG/1
12.5 TABLET ORAL 3 TIMES DAILY PRN
Qty: 30 TABLET | Refills: 2 | Status: SHIPPED | OUTPATIENT
Start: 2021-06-22 | End: 2021-09-14 | Stop reason: SDUPTHER

## 2021-06-22 RX ORDER — FLUOXETINE HYDROCHLORIDE 20 MG/1
60 CAPSULE ORAL DAILY
Qty: 270 CAPSULE | Refills: 1 | Status: SHIPPED | OUTPATIENT
Start: 2021-06-22 | End: 2021-09-14 | Stop reason: SDUPTHER

## 2021-06-22 ASSESSMENT — ENCOUNTER SYMPTOMS
RHINORRHEA: 0
SORE THROAT: 0
ABDOMINAL PAIN: 0
DIARRHEA: 0
BACK PAIN: 1
SHORTNESS OF BREATH: 0
BLOOD IN STOOL: 0
CHEST TIGHTNESS: 0
CONSTIPATION: 0
SINUS PRESSURE: 0
NAUSEA: 0
VOMITING: 0
COUGH: 0
WHEEZING: 0
SINUS PAIN: 0

## 2021-07-22 ENCOUNTER — OFFICE VISIT (OUTPATIENT)
Dept: PSYCHOLOGY | Age: 41
End: 2021-07-22
Payer: MEDICARE

## 2021-07-22 DIAGNOSIS — F43.10 PTSD (POST-TRAUMATIC STRESS DISORDER): Primary | ICD-10-CM

## 2021-07-22 PROCEDURE — 1036F TOBACCO NON-USER: CPT | Performed by: PSYCHOLOGIST

## 2021-07-22 PROCEDURE — 90834 PSYTX W PT 45 MINUTES: CPT | Performed by: PSYCHOLOGIST

## 2021-07-22 NOTE — PROGRESS NOTES
Behavioral Health Consultation  Andres Almonte PsyD  Psychologist  7/22/2021  3:20 PM      Time spent with Patient: 38 minutes  This is patient's first  [facility] appointment. Reason for Consult:  PTSD  Referring Provider: Irene Youssef MD  [address]  [address],  [address]    Feedback given to PCP. S:    Last appt: 4/28/20 virtual visit. Last in person: 12/13/19. Stressor: Mother and grandmother moving in August but pt overall feels this is the right thing to do for her family. They were to move in last August but was was delayed after both of them contracted COVID. House is almost ready, pt moved into attic space, they will live on first floor. Overall doing very well and happy to find new PCP and reconnect with me. Rest of appt focused on monitoring/maintenance plan for chronic PTSD, anticipate her anxiety may worsen during this transition of 2 new family members in her home. Good news she has access to more DBT therapy as needed, we will reassess the need for this at each appt. F/u with me in 1 month. Psychotherapy: continued with on line DBT therapy until COVID infection in May 2020, longer term symptoms from [facility] which exacerbated chronic pain issues. Found out she can return for another \"dose\" DBT as needed.      O:  MSE:    Appearance    alert, cooperative  Appetite normal  Sleep disturbance Yes  Fatigue Yes  Loss of pleasure Yes  Impulsive behavior No  Speech    normal rate, normal volume and well articulated  Mood    Baseline anxiety, managing   Affect    normal affect  Thought Content    intact  Thought Process    linear, goal directed and coherent  Associations    logical connections  Insight    Good  Judgment    Intact  Orientation    oriented to person, place, time, and general circumstances  Memory    recent and remote memory intact  Attention/Concentration    intact  Morbid ideation No  Suicide Assessment    no suicidal ideation      History:    Medications: Current Outpatient Medications   Medication Sig Dispense Refill    SUMAtriptan (IMITREX) 50 MG tablet Take 1 tablet by mouth once as needed for Migraine 9 tablet 1    meclizine (ANTIVERT) 12.5 MG tablet Take 1 tablet by mouth 3 times daily as needed for Dizziness 30 tablet 2    ondansetron (ZOFRAN) 4 MG tablet TAKE ONE TABLET BY MOUTH EVERY 8 HOURS AS NEEDED FOR NAUSEA AND VOMITING 30 tablet 0    albuterol sulfate  (90 Base) MCG/ACT inhaler Inhale 2 puffs into the lungs every 4 hours as needed for Wheezing 1 Inhaler 12    albuterol (PROVENTIL) (2.5 MG/3ML) 0.083% nebulizer solution INHALE ONE VIAL VIA NEBULIZATION BY MOUTH EVERY 6 HOURS AS NEEDED FOR SHORTNESS OF BREATH OR FOR WHEEZING 360 each 3    FLUoxetine (PROZAC) 20 MG capsule Take 3 capsules by mouth daily 270 capsule 1    fluticasone-salmeterol (ADVAIR DISKUS) 500-50 MCG/DOSE diskus inhaler Inhale 1 puff into the lungs every 12 hours 180 each 3    loratadine (CLARITIN) 10 MG tablet Take 1 tablet by mouth daily 90 tablet 1    diazePAM (VALIUM) 5 MG tablet Take 1 tablet by mouth daily as needed for Anxiety for up to 90 days. (Patient not taking: Reported on 6/22/2021) 30 tablet 0     No current facility-administered medications for this visit.        Social History:   Social History     Socioeconomic History    Marital status: Single     Spouse name: Not on file    Number of children: Not on file    Years of education: Not on file    Highest education level: Not on file   Occupational History    Not on file   Tobacco Use    Smoking status: Never Smoker    Smokeless tobacco: Never Used   Vaping Use    Vaping Use: Never used   Substance and Sexual Activity    Alcohol use: Yes     Comment: occasional    Drug use: No    Sexual activity: Yes     Partners: Male   Other Topics Concern    Not on file   Social History Narrative    Not on file     Social Determinants of Health     Financial Resource Strain: High Risk    Difficulty of Paying Living Expenses: Hard   Food Insecurity: Food Insecurity Present    Worried About Running Out of Food in the Last Year: Sometimes true    Ran Out of Food in the Last Year: Sometimes true   Transportation Needs:     Lack of Transportation (Medical):  Lack of Transportation (Non-Medical):    Physical Activity:     Days of Exercise per Week:     Minutes of Exercise per Session:    Stress:     Feeling of Stress :    Social Connections:     Frequency of Communication with Friends and Family:     Frequency of Social Gatherings with Friends and Family:     Attends [employer] Services:     Active Member of Clubs or Organizations:     Attends Club or Organization Meetings:     Marital Status:    Intimate Partner Violence:     Fear of Current or Ex-Partner:     Emotionally Abused:     Physically Abused:     Sexually Abused:        TOBACCO:   reports that she has never smoked. She has never used smokeless tobacco.  ETOH:   reports current alcohol use. Family History:   Family History   Problem Relation Age of Onset    Other Mother         autoimmune dz    Other Father         cirrhosis    Substance Abuse Father     Cancer Father      A:    See S: above    PHQ Scores 6/10/2021 12/13/2019 10/22/2019 8/13/2019 7/22/2019 6/25/2019 11/13/2018   PHQ2 Score 6 6 6 6 6 6 5   PHQ9 Score 27 24 24 27 26 21 26     Interpretation of Total Score Depression Severity: 1-4 = Minimal depression, 5-9 = Mild depression, 10-14 = Moderate depression, 15-19 = Moderately severe depression, 20-27 = Severe depression    Safety: denied any si/hi risk, intent, or plan.      Diagnosis:    PTSD, BPD      Diagnosis Date    Asthma     Back pain     Bilateral ovarian cysts     Bipolar 1 disorder (HCC)     Cancer (HCC)     adenoca in situ cervix    DDD (degenerative disc disease), lumbar     Headache(784.0)     IBS (irritable bowel syndrome)     Multiple benign melanocytic nevi 4/10/2019    OCD (obsessive compulsive disorder)     PTSD (post-traumatic stress disorder)     Vertigo      Problems with primary support group, Problems related to the social environment and Other psychosocial and environmental problems    Plan:  Pt interventions:    Practiced assertive communication, Trained in strategies for increasing balanced thinking, Discussed self-care (sleep, nutrition, rewarding activities, social support, exercise), Discussed benefits of referral for specialty care, Established rapport, Conducted functional assessment and Supportive techniques    Pt Behavioral Change Plan:    1. Continue to work on [employer] for move in date in August for mother and grandmother   2. Continue to use emotion regulation DBT skills daily to manage daily anxiety  3. Continue to take psychiatric medication as prescribed, go to Dr Tena Pettit as needed  4. Continue to consider returning to on line DBT treatment if anxiety worsens over the next 3-6 months  5.  Return to clinic for Dr Zara Jaffe in 1 month

## 2021-07-22 NOTE — PATIENT INSTRUCTIONS
1. Continue to work on [employer] for move in date in August for mother and grandmother   2. Continue to use emotion regulation DBT skills daily to manage daily anxiety  3. Continue to take psychiatric medication as prescribed, go to Dr Elzbieta Garcia as needed  4. Continue to consider returning to on line DBT treatment if anxiety worsens over the next 3-6 months  5.  Return to clinic for Dr Parvez Rizvi in 1 month

## 2021-09-07 ENCOUNTER — NURSE TRIAGE (OUTPATIENT)
Dept: OTHER | Facility: CLINIC | Age: 41
End: 2021-09-07

## 2021-09-07 NOTE — TELEPHONE ENCOUNTER
your last menstrual period? \"         LMP: Hysterectomy     11. OTHER SYMPTOMS: \"Do you have any other symptoms? \"  (e.g., chills, fatigue, headache, loss of smell or taste, muscle pain, sore throat; new loss of smell or taste especially support the diagnosis of COVID-19)        Fatigue, diarrhea, congestion, runny nose, intermittent chest pain, SOB    Protocols used: CORONAVIRUS (COVID-19) DIAGNOSED OR SUSPECTED-ADULT-AH    Received call from Jhony at [facility] with Red Flag Complaint. Brief description of triage: See above note    Triage indicates for patient to: ED Now    Care advice provided, patient verbalizes understanding; denies any other questions or concerns; instructed to call back for any new or worsening symptoms. Attention Provider: Thank you for allowing me to participate in the care of your patient. The patient was connected to triage in response to information provided to the ECC. Please do not respond through this encounter as the response is not directed to a shared pool.

## 2021-09-09 ENCOUNTER — HOSPITAL ENCOUNTER (EMERGENCY)
Age: 41
Discharge: HOME | End: 2021-09-09
Payer: MEDICARE

## 2021-09-09 ENCOUNTER — VIRTUAL VISIT (OUTPATIENT)
Dept: PRIMARY CARE CLINIC | Age: 41
End: 2021-09-09
Payer: MEDICARE

## 2021-09-09 ENCOUNTER — APPOINTMENT (OUTPATIENT)
Dept: GENERAL RADIOLOGY | Age: 41
End: 2021-09-09
Payer: MEDICARE

## 2021-09-09 VITALS
TEMPERATURE: 98 F | WEIGHT: 210.76 LBS | BODY MASS INDEX: 35.11 KG/M2 | HEIGHT: 65 IN | DIASTOLIC BLOOD PRESSURE: 70 MMHG | RESPIRATION RATE: 30 BRPM | OXYGEN SATURATION: 99 % | HEART RATE: 85 BPM | SYSTOLIC BLOOD PRESSURE: 115 MMHG

## 2021-09-09 DIAGNOSIS — R07.89 CHEST TIGHTNESS: ICD-10-CM

## 2021-09-09 DIAGNOSIS — R06.02 SHORTNESS OF BREATH: Primary | ICD-10-CM

## 2021-09-09 DIAGNOSIS — B34.9 VIRAL ILLNESS: ICD-10-CM

## 2021-09-09 DIAGNOSIS — J45.41 MODERATE PERSISTENT ASTHMA WITH EXACERBATION: Primary | ICD-10-CM

## 2021-09-09 DIAGNOSIS — R53.83 FATIGUE, UNSPECIFIED TYPE: ICD-10-CM

## 2021-09-09 LAB
A/G RATIO: 1.3 (ref 1.1–2.2)
ALBUMIN SERPL-MCNC: 4.1 G/DL (ref 3.4–5)
ALP BLD-CCNC: 70 U/L (ref 40–129)
ALT SERPL-CCNC: 12 U/L (ref 10–40)
ANION GAP SERPL CALCULATED.3IONS-SCNC: 11 MMOL/L (ref 3–16)
AST SERPL-CCNC: 13 U/L (ref 15–37)
BASOPHILS ABSOLUTE: 0.1 K/UL (ref 0–0.2)
BASOPHILS RELATIVE PERCENT: 0.8 %
BILIRUB SERPL-MCNC: <0.2 MG/DL (ref 0–1)
BUN BLDV-MCNC: 11 MG/DL (ref 7–20)
CALCIUM SERPL-MCNC: 9.6 MG/DL (ref 8.3–10.6)
CHLORIDE BLD-SCNC: 104 MMOL/L (ref 99–110)
CO2: 24 MMOL/L (ref 21–32)
CREAT SERPL-MCNC: 0.8 MG/DL (ref 0.6–1.1)
D DIMER: <200 NG/ML DDU (ref 0–229)
EKG ATRIAL RATE: 102 BPM
EKG DIAGNOSIS: NORMAL
EKG P AXIS: 46 DEGREES
EKG P-R INTERVAL: 130 MS
EKG Q-T INTERVAL: 340 MS
EKG QRS DURATION: 88 MS
EKG QTC CALCULATION (BAZETT): 443 MS
EKG R AXIS: 2 DEGREES
EKG T AXIS: 26 DEGREES
EKG VENTRICULAR RATE: 102 BPM
EOSINOPHILS ABSOLUTE: 1.5 K/UL (ref 0–0.6)
EOSINOPHILS RELATIVE PERCENT: 14.1 %
GFR AFRICAN AMERICAN: >60
GFR NON-AFRICAN AMERICAN: >60
GLOBULIN: 3.1 G/DL
GLUCOSE BLD-MCNC: 120 MG/DL (ref 70–99)
HCT VFR BLD CALC: 38.9 % (ref 36–48)
HEMOGLOBIN: 13 G/DL (ref 12–16)
LYMPHOCYTES ABSOLUTE: 4 K/UL (ref 1–5.1)
LYMPHOCYTES RELATIVE PERCENT: 36 %
MCH RBC QN AUTO: 29.5 PG (ref 26–34)
MCHC RBC AUTO-ENTMCNC: 33.3 G/DL (ref 31–36)
MCV RBC AUTO: 88.5 FL (ref 80–100)
MONOCYTES ABSOLUTE: 0.6 K/UL (ref 0–1.3)
MONOCYTES RELATIVE PERCENT: 5.2 %
NEUTROPHILS ABSOLUTE: 4.8 K/UL (ref 1.7–7.7)
NEUTROPHILS RELATIVE PERCENT: 43.9 %
PDW BLD-RTO: 13.8 % (ref 12.4–15.4)
PLATELET # BLD: 236 K/UL (ref 135–450)
PMV BLD AUTO: 8.8 FL (ref 5–10.5)
POTASSIUM SERPL-SCNC: 4 MMOL/L (ref 3.5–5.1)
RBC # BLD: 4.4 M/UL (ref 4–5.2)
SARS-COV-2, PCR: NOT DETECTED
SODIUM BLD-SCNC: 139 MMOL/L (ref 136–145)
TOTAL PROTEIN: 7.2 G/DL (ref 6.4–8.2)
TROPONIN: <0.01 NG/ML
TSH REFLEX: 1.53 UIU/ML (ref 0.27–4.2)
WBC # BLD: 11 K/UL (ref 4–11)

## 2021-09-09 PROCEDURE — 85025 COMPLETE CBC W/AUTO DIFF WBC: CPT

## 2021-09-09 PROCEDURE — U0005 INFEC AGEN DETEC AMPLI PROBE: HCPCS

## 2021-09-09 PROCEDURE — U0003 INFECTIOUS AGENT DETECTION BY NUCLEIC ACID (DNA OR RNA); SEVERE ACUTE RESPIRATORY SYNDROME CORONAVIRUS 2 (SARS-COV-2) (CORONAVIRUS DISEASE [COVID-19]), AMPLIFIED PROBE TECHNIQUE, MAKING USE OF HIGH THROUGHPUT TECHNOLOGIES AS DESCRIBED BY CMS-2020-01-R: HCPCS

## 2021-09-09 PROCEDURE — 6360000002 HC RX W HCPCS: Performed by: PHYSICIAN ASSISTANT

## 2021-09-09 PROCEDURE — 99214 OFFICE O/P EST MOD 30 MIN: CPT | Performed by: STUDENT IN AN ORGANIZED HEALTH CARE EDUCATION/TRAINING PROGRAM

## 2021-09-09 PROCEDURE — G8427 DOCREV CUR MEDS BY ELIG CLIN: HCPCS | Performed by: STUDENT IN AN ORGANIZED HEALTH CARE EDUCATION/TRAINING PROGRAM

## 2021-09-09 PROCEDURE — 99283 EMERGENCY DEPT VISIT LOW MDM: CPT

## 2021-09-09 PROCEDURE — 96374 THER/PROPH/DIAG INJ IV PUSH: CPT

## 2021-09-09 PROCEDURE — 84484 ASSAY OF TROPONIN QUANT: CPT

## 2021-09-09 PROCEDURE — 1036F TOBACCO NON-USER: CPT | Performed by: STUDENT IN AN ORGANIZED HEALTH CARE EDUCATION/TRAINING PROGRAM

## 2021-09-09 PROCEDURE — 84443 ASSAY THYROID STIM HORMONE: CPT

## 2021-09-09 PROCEDURE — 71045 X-RAY EXAM CHEST 1 VIEW: CPT

## 2021-09-09 PROCEDURE — 80053 COMPREHEN METABOLIC PANEL: CPT

## 2021-09-09 PROCEDURE — 93010 ELECTROCARDIOGRAM REPORT: CPT | Performed by: INTERNAL MEDICINE

## 2021-09-09 PROCEDURE — 93005 ELECTROCARDIOGRAM TRACING: CPT

## 2021-09-09 PROCEDURE — 85379 FIBRIN DEGRADATION QUANT: CPT

## 2021-09-09 PROCEDURE — G8417 CALC BMI ABV UP PARAM F/U: HCPCS | Performed by: STUDENT IN AN ORGANIZED HEALTH CARE EDUCATION/TRAINING PROGRAM

## 2021-09-09 PROCEDURE — 2580000003 HC RX 258: Performed by: PHYSICIAN ASSISTANT

## 2021-09-09 RX ORDER — ALBUTEROL SULFATE 90 UG/1
2 AEROSOL, METERED RESPIRATORY (INHALATION) EVERY 6 HOURS PRN
Qty: 18 G | Refills: 0 | Status: SHIPPED | OUTPATIENT
Start: 2021-09-09 | End: 2021-09-14 | Stop reason: SDUPTHER

## 2021-09-09 RX ORDER — PREDNISONE 20 MG/1
40 TABLET ORAL DAILY
Qty: 10 TABLET | Refills: 0 | Status: SHIPPED | OUTPATIENT
Start: 2021-09-09 | End: 2021-09-14

## 2021-09-09 RX ORDER — KETOROLAC TROMETHAMINE 15 MG/ML
15 INJECTION, SOLUTION INTRAMUSCULAR; INTRAVENOUS ONCE
Status: COMPLETED | OUTPATIENT
Start: 2021-09-09 | End: 2021-09-09

## 2021-09-09 RX ORDER — 0.9 % SODIUM CHLORIDE 0.9 %
500 INTRAVENOUS SOLUTION INTRAVENOUS ONCE
Status: COMPLETED | OUTPATIENT
Start: 2021-09-09 | End: 2021-09-09

## 2021-09-09 RX ORDER — PREDNISONE 20 MG/1
40 TABLET ORAL DAILY
Qty: 8 TABLET | Refills: 0 | Status: SHIPPED | OUTPATIENT
Start: 2021-09-09 | End: 2021-09-09

## 2021-09-09 RX ADMIN — KETOROLAC TROMETHAMINE 15 MG: 15 INJECTION, SOLUTION INTRAMUSCULAR; INTRAVENOUS at 06:46

## 2021-09-09 RX ADMIN — SODIUM CHLORIDE 500 ML: 9 INJECTION, SOLUTION INTRAVENOUS at 06:55

## 2021-09-09 ASSESSMENT — ENCOUNTER SYMPTOMS
WHEEZING: 1
SINUS PRESSURE: 1
DIARRHEA: 1
VOMITING: 0
CONSTIPATION: 1
SHORTNESS OF BREATH: 1
COUGH: 1
RHINORRHEA: 1
CHEST TIGHTNESS: 1
SHORTNESS OF BREATH: 1
COLOR CHANGE: 0
ABDOMINAL PAIN: 0
BACK PAIN: 0
CHEST TIGHTNESS: 1

## 2021-09-09 ASSESSMENT — PAIN DESCRIPTION - ORIENTATION
ORIENTATION: MID
ORIENTATION: MID

## 2021-09-09 ASSESSMENT — PAIN DESCRIPTION - ONSET
ONSET: GRADUAL
ONSET: GRADUAL

## 2021-09-09 ASSESSMENT — PAIN SCALES - GENERAL
PAINLEVEL_OUTOF10: 1
PAINLEVEL_OUTOF10: 3
PAINLEVEL_OUTOF10: 3

## 2021-09-09 ASSESSMENT — PAIN DESCRIPTION - PAIN TYPE
TYPE: ACUTE PAIN
TYPE: ACUTE PAIN

## 2021-09-09 ASSESSMENT — PAIN - FUNCTIONAL ASSESSMENT: PAIN_FUNCTIONAL_ASSESSMENT: 0-10

## 2021-09-09 ASSESSMENT — PAIN DESCRIPTION - FREQUENCY
FREQUENCY: CONTINUOUS
FREQUENCY: CONTINUOUS

## 2021-09-09 ASSESSMENT — PAIN DESCRIPTION - PROGRESSION
CLINICAL_PROGRESSION: GRADUALLY IMPROVING
CLINICAL_PROGRESSION: GRADUALLY WORSENING

## 2021-09-09 ASSESSMENT — PAIN DESCRIPTION - LOCATION
LOCATION: CHEST
LOCATION: CHEST

## 2021-09-09 ASSESSMENT — PAIN DESCRIPTION - DESCRIPTORS
DESCRIPTORS: DISCOMFORT
DESCRIPTORS: DISCOMFORT

## 2021-09-09 NOTE — ED TRIAGE NOTES
Pt states she has been having extreme exhaustion, and SOB. Pt states she has been having a low grade fever with cold sweats and chills. Pt states she has had a nonproductive cough. Pt also states she has been having some n/v with episodes of diarrhea.   Pt states these symptoms began at the end of august.

## 2021-09-09 NOTE — ED PROVIDER NOTES
[address] [facility]      Pt Name: Mariajose Theodore  MRN: [id number]  [place] 1980  Date of evaluation: 9/9/2021  Provider: JR Russell    This patient was not seen and evaluated by the attending physician No att. providers found. CHIEF COMPLAINT       Chief Complaint   Patient presents with    Shortness of Breath       CRITICAL CARE TIME   I performed a total Critical Care time of 15 minutes, excluding separately reportable procedures. There was a high probability of clinically significant/life threatening deterioration in the patient's condition which required my urgent intervention. Not limited to multiple reexaminations, discussions with attending physician and consultants. HISTORY OF PRESENT ILLNESS  (Location/Symptom, Timing/Onset, Context/Setting, Quality, Duration, Modifying Factors, Severity.)   Mariajose Theodore is a 45 y.o. female who presents to the emergency department with complaint of shortness of breath chest tightness feeling fatigued. Symptoms have been going on for over 2 weeks. She states they started on 20 August.  She has history of asthma and IBS. She never smoked. No calf tenderness or leg swelling. No history of DVT or PE. She is had waxing and waning appetite and waxing waning diarrhea versus constipation. She is fully vaccinated for COVID-19. Did not take anything today for the symptoms. Nursing Notes were reviewed and I agree. REVIEW OF SYSTEMS    (2-9 systems for level 4, 10 or more for level 5)     Review of Systems   Constitutional: Positive for fatigue. Negative for fever. Respiratory: Positive for chest tightness and shortness of breath. Cardiovascular: Negative for leg swelling. Gastrointestinal: Positive for constipation and diarrhea. Negative for abdominal pain and vomiting. Musculoskeletal: Negative for back pain, neck pain and neck stiffness.    Skin: Negative for color change, rash and wound. Neurological: Positive for weakness. Negative for numbness. Psychiatric/Behavioral: Negative for agitation, behavioral problems and confusion. Except as noted above the remainder of the review of systems was reviewed and negative.        PAST MEDICAL HISTORY         Diagnosis Date    Asthma     Back pain     Bilateral ovarian cysts     Bipolar 1 disorder (HCC)     Cancer (HCC)     adenoca in situ cervix    DDD (degenerative disc disease), lumbar     Headache(784.0)     IBS (irritable bowel syndrome)     Multiple benign melanocytic nevi 4/10/2019    OCD (obsessive compulsive disorder)     PTSD (post-traumatic stress disorder)     Vertigo        SURGICAL HISTORY           Procedure Laterality Date    CERVIX BIOPSY      HYSTERECTOMY VAGINAL  02/22/2017    HYSTERECTOMY, VAGINAL      WISDOM TOOTH EXTRACTION         CURRENT MEDICATIONS       Discharge Medication List as of 9/9/2021  8:11 AM      CONTINUE these medications which have NOT CHANGED    Details   SUMAtriptan (IMITREX) 50 MG tablet Take 1 tablet by mouth once as needed for Migraine, Disp-9 tablet, R-1Normal      meclizine (ANTIVERT) 12.5 MG tablet Take 1 tablet by mouth 3 times daily as needed for Dizziness, Disp-30 tablet, R-2Normal      ondansetron (ZOFRAN) 4 MG tablet TAKE ONE TABLET BY MOUTH EVERY 8 HOURS AS NEEDED FOR NAUSEA AND VOMITING, Disp-30 tablet, R-0Normal      FLUoxetine (PROZAC) 20 MG capsule Take 3 capsules by mouth daily, Disp-270 capsule, R-1Normal      fluticasone-salmeterol (ADVAIR DISKUS) 500-50 MCG/DOSE diskus inhaler Inhale 1 puff into the lungs every 12 hours, Disp-180 each, R-3Normal      loratadine (CLARITIN) 10 MG tablet Take 1 tablet by mouth daily, Disp-90 tablet, R-1Normal             ALLERGIES     Amitriptyline    FAMILY HISTORY           Problem Relation Age of Onset    Other Mother         autoimmune dz    Other Father         cirrhosis    Substance Abuse Father     Cancer Father Family Status   Relation Name Status    Mother  Alive    Father          SOCIAL HISTORY      reports that she has never smoked. She has never used smokeless tobacco. She reports current alcohol use. She reports that she does not use drugs. PHYSICAL EXAM    (up to 7 for level 4, 8 or more for level 5)     ED Triage Vitals [21 0528]   BP Temp Temp src Pulse Resp SpO2 Height Weight   135/86 98 °F (36.7 °C) -- 108 20 96 % 5' 5\" (1.651 m) 210 lb 12.2 oz (95.6 kg)       Physical Exam  Vitals and nursing note reviewed. Constitutional:       Appearance: She is well-developed. HENT:      Head: Normocephalic and atraumatic. Cardiovascular:      Rate and Rhythm: Normal rate. Pulmonary:      Effort: Pulmonary effort is normal. No respiratory distress. Breath sounds: No decreased breath sounds. Abdominal:      Tenderness: There is no abdominal tenderness. Musculoskeletal:      Cervical back: Normal range of motion. Skin:     General: Skin is warm. Neurological:      General: No focal deficit present. Mental Status: She is alert and oriented to person, place, and time. Psychiatric:         Mood and Affect: Mood normal.         Behavior: Behavior normal.         DIAGNOSTIC RESULTS     EKG: All EKG's are interpreted by JR Lamas in the absence of a cardiologist.    EKG interpreted by myself - please refer to attending physician's note for complete EKG interpretation:    Rhythm: sinus rhythm   No evidence of acute ischemia or injury. RADIOLOGY:   Non-plain film images such as CT, Ultrasound and MRI are read by the radiologist. Plain radiographic images are visualized and preliminarily interpreted by JR Lamas with the below findings:    Reviewed radiologist's interpretation.      Interpretation per the Radiologist below, if available at the time of this note:    XR CHEST PORTABLE   Final Result   Unremarkable single portable AP chest.               LABS:  Labs Reviewed   CBC WITH AUTO DIFFERENTIAL - Abnormal; Notable for the following components:       Result Value    Eosinophils Absolute 1.5 (*)     All other components within normal limits    Narrative:     Performed at:  [facility]  [address] [place] [facility] [address]   Phone [phone]   COMPREHENSIVE METABOLIC PANEL - Abnormal; Notable for the following components:    Glucose 120 (*)     AST 13 (*)     All other components within normal limits    Narrative:     Performed at:  [facility]  [address] [facility] [address]   Phone [phone]   TSH WITH REFLEX    Narrative:     Performed at:  [facility]  [address] [facility] [address]   Phone [phone]   TROPONIN    Narrative:     Performed at:  [facility]  [address] [place] [address]   Phone [phone]   D-DIMER, QUANTITATIVE    Narrative:     Performed at:  [facility]  [address] [place] [address] [facility] [address]   Phone [phone]       All other labs were within normal range or not returned as of this dictation. EMERGENCY DEPARTMENT COURSE and DIFFERENTIAL DIAGNOSIS/MDM:   Vitals:    Vitals:    09/09/21 0600 09/09/21 0714 09/09/21 0740 09/09/21 0745   BP: 112/70 108/66  115/70   Pulse: 101 85 69 85   Resp: 28 26 19 30   Temp:       SpO2:  98% 97% 99%   Weight:       Height:         I discussed with Saira Matias and/or family the exam results, diagnosis, care, prognosis, reasons to return and the importance of follow up. Patient and/or family is in full agreement with plan and all questions have been answered. Specific discharge instructions explained, including reasons to return to the emergency department. Saira Matias is well appearing, non-toxic, and afebrile at the time of discharge.      Patient is afebrile initial pulse

## 2021-09-09 NOTE — PATIENT INSTRUCTIONS
Patient Education        Asthma Attack: Care Instructions  Overview     During an asthma attack, the airways swell and narrow. This makes it hard to breathe. Severe asthma attacks can be dangerous. But you can help prevent these attacks by keeping your asthma under control and treating symptoms before they get bad. Symptoms include being short of breath, having chest tightness, coughing, and wheezing. Noting and treating these symptoms can also help you avoid trips to the emergency room. If you notice any problems or new symptoms, get medical treatment right away. Follow-up care is a key part of your treatment and safety. Be sure to make and go to all appointments, and call your doctor if you are having problems. It's also a good idea to know your test results and keep a list of the medicines you take. How can you care for yourself at home? · Follow your asthma action plan to prevent and treat attacks. If you don't have an asthma action plan, work with your doctor to create one. · Take your asthma medicines exactly as prescribed. Talk to your doctor right away if you have any questions about how to take them. ? Use your quick-relief medicine when you have symptoms of an attack. Quick-relief medicine is usually an albuterol inhaler. Some people need to use quick-relief medicine before they exercise. ? Take your controller medicine every day, not just when you have symptoms. Controller medicine is usually an inhaled corticosteroid. The goal is to prevent problems before they occur. ? If your doctor prescribed corticosteroid pills to use during an attack, take them exactly as prescribed. It may take hours for the pills to work, but they may make the episode shorter and help you breathe better. ? Keep your quick-relief medicine with you at all times. · Talk to your doctor before using other medicines. Some medicines, such as aspirin, can cause asthma attacks in some people.   · If you have a peak flow meter, use it to check how well you are breathing. This can help you predict when an asthma attack is going to occur. Then you can take medicine to prevent the asthma attack or make it less severe. · Do not smoke or allow others to smoke around you. Avoid smoky places. Smoking makes asthma worse. If you need help quitting, talk to your doctor about stop-smoking programs and medicines. These can increase your chances of quitting for good. · Learn what triggers an asthma attack for you, and avoid the triggers when you can. Common triggers include colds, smoke, air pollution, dust, pollen, mold, pets, cockroaches, stress, and cold air. · Avoid colds and the flu. Talk to your doctor about getting a pneumococcal vaccine shot. If you have had one before, ask your doctor if you need a second dose. Get a flu vaccine every fall. If you must be around people with colds or the flu, wash your hands often. When should you call for help? Call 911 anytime you think you may need emergency care. For example, call if:    · You have severe trouble breathing. Call your doctor now or seek immediate medical care if:    · Your symptoms do not get better after you have followed your asthma action plan.     · You have new or worse trouble breathing.     · Your coughing and wheezing get worse.     · You cough up dark brown or bloody mucus (sputum).     · You have a new or higher fever. Watch closely for changes in your health, and be sure to contact your doctor if:    · You need to use quick-relief medicine on more than 2 days a week within a month (unless it is just for exercise).     · You cough more deeply or more often, especially if you notice more mucus or a change in the color of your mucus.     · You are not getting better as expected. Where can you learn more? Go to https://ayo.[employer]. org and sign in to your [employer] account.  Enter X971 in the [employer] box to learn more about \"Asthma Attack: Care Instructions. \"     If you do not have an account, please click on the \"Sign Up Now\" link. Current as of: October 26, 2020               Content Version: 12.9  © 2006-2021 Healthwise, Incorporated. Care instructions adapted under license by [facility] ([facility]). If you have questions about a medical condition or this instruction, always ask your healthcare professional. [address] any warranty or liability for your use of this information.

## 2021-09-09 NOTE — PROGRESS NOTES
2021    TELEHEALTH EVALUATION -- Audio/Visual (During PNUCT-55 public health emergency)    HPI:    Stephanie Bhandari (:  1980) has requested an audio/video evaluation for the following concern(s):    Cough  Patient complains of congestion, cough and chest tightness. Onset of symptoms was 1 week ago, gradually worsening since that time. She also c/o congestion, cough described as non-productive, nasal congestion and shortness of breath for the past 5 days . She is drinking plenty of fluids. Evaluation to date: none. Treatment to date: none. She does not have a history of known exposure to COVID-19. She did get a Covid test yesterday and the results are pending. Review of Systems   HENT: Positive for congestion, postnasal drip, rhinorrhea and sinus pressure. Respiratory: Positive for cough, chest tightness, shortness of breath and wheezing. Cardiovascular: Negative for chest pain. Neurological: Negative for dizziness and light-headedness. Prior to Visit Medications    Medication Sig Taking?  Authorizing Provider   predniSONE (DELTASONE) 20 MG tablet Take 2 tablets by mouth daily for 5 days Yes Suad Bonilla DO   albuterol sulfate HFA (PROVENTIL HFA) 108 (90 Base) MCG/ACT inhaler Inhale 2 puffs into the lungs every 6 hours as needed for Wheezing or Shortness of Breath  JR Cano   SUMAtriptan (IMITREX) 50 MG tablet Take 1 tablet by mouth once as needed for Migraine  Joanie Pizano MD   meclizine (ANTIVERT) 12.5 MG tablet Take 1 tablet by mouth 3 times daily as needed for Dizziness  Joanie Pizano MD   ondansetron (ZOFRAN) 4 MG tablet TAKE ONE TABLET BY MOUTH EVERY 8 HOURS AS NEEDED FOR NAUSEA AND VOMITING  Joanie Pizano MD   FLUoxetine (PROZAC) 20 MG capsule Take 3 capsules by mouth daily  Joanie Pizano MD   fluticasone-salmeterol (ADVAIR DISKUS) 500-50 MCG/DOSE diskus inhaler Inhale 1 puff into the lungs every 12 hours  Joanie Pizano MD   loratadine (CLARITIN) 10 MG tablet Take 1 tablet by mouth daily  Olamide Franklin MD       Social History     Tobacco Use    Smoking status: Never Smoker    Smokeless tobacco: Never Used   Vaping Use    Vaping Use: Never used   Substance Use Topics    Alcohol use: Yes     Comment: occasional    Drug use: No        Past Medical History:   Diagnosis Date    Asthma     Back pain     Bilateral ovarian cysts     Bipolar 1 disorder (HCC)     Cancer (HCC)     adenoca in situ cervix    DDD (degenerative disc disease), lumbar     Headache(784.0)     IBS (irritable bowel syndrome)     Multiple benign melanocytic nevi 4/10/2019    OCD (obsessive compulsive disorder)     PTSD (post-traumatic stress disorder)     Vertigo        PHYSICAL EXAMINATION:  LMP 02/02/2016   Wt Readings from Last 3 Encounters:   09/09/21 210 lb 12.2 oz (95.6 kg)   06/22/21 205 lb 6.4 oz (93.2 kg)   06/09/21 203 lb (92.1 kg)       [ INSTRUCTIONS:  \"[x]\" Indicates a positive item  \"[]\" Indicates a negative item  -- DELETE ALL ITEMS NOT EXAMINED]  [x] Alert  [x] Oriented to person/place/time    [x] No apparent distress  []  Appears Unwell:     [x] Breathing normal  [x] cough      [] Rash on visible skin:    [x] Cranial Nerves II-XII grossly intact    [x] Motor grossly intact in visible upper extremities    [] Motor grossly intact in visible lower extremities    [x] Normal Mood  [] Anxious appearing    [] Depressed appearing     [] OTHER:      Due to this being a TeleHealth encounter, evaluation of the following organ systems is limited: Vitals/Constitutional/EENT/Resp/CV/GI//MS/Neuro/Skin/Heme-Lymph-Imm.   Lab Results   Component Value Date     09/09/2021    K 4.0 09/09/2021     09/09/2021    CO2 24 09/09/2021    BUN 11 09/09/2021    CREATININE 0.8 09/09/2021    GLUCOSE 120 (H) 09/09/2021    CALCIUM 9.6 09/09/2021    PROT 7.2 09/09/2021    LABALBU 4.1 09/09/2021    BILITOT <0.2 09/09/2021    ALKPHOS 70 09/09/2021    AST 13 (L) 09/09/2021    ALT 12 09/09/2021    LABGLOM >60 09/09/2021    GFRAA >60 09/09/2021    AGRATIO 1.3 09/09/2021    GLOB 3.1 09/09/2021     No results found for: LABA1C  No results found for: EAG      ASSESSMENT/PLAN:  1. Moderate persistent asthma with exacerbation: Patient presents with initial symptoms of an upper respiratory infection, which has now evolved into some chest tightness shortness of breath. Will treat as an asthma exacerbation. No symptoms to suggest bacterial infection. Did  patient on signs or symptoms of worsening infection and when to call or present to the office for evaluation. If she is not significantly better by early next week she will call for follow-up appointment. Covid test pending. She understands the appropriate quarantine if positive. - predniSONE (DELTASONE) 20 MG tablet; Take 2 tablets by mouth daily for 5 days  Dispense: 10 tablet; Refill: 0    2. Viral illness: As above. Return if symptoms worsen or fail to improve. An  electronic signature was used to authenticate this note. --Noralee Merlin, DO on 9/9/2021 at 1:59 PM        Pursuant to the emergency declaration under the [facility] waiver authority and the [employer] Act, this Virtual  Visit was conducted, with patient's consent, to reduce the patient's risk of exposure to COVID-19 and provide continuity of care for an established patient. Services were provided through a video synchronous discussion virtually to substitute for in-person clinic visit.

## 2021-09-09 NOTE — ED NOTES
Care assumed at this time.  Report received from Tere Talavera [address] [place] [address]  09/09/21 9914

## 2021-09-09 NOTE — ED NOTES
Discharge and education instructions reviewed. Patient verbalized understanding, teach-back successful. Patient denied questions at this time. No acute distress noted. Patient instructed to follow-up as noted - return to emergency department if symptoms worsen. Patient verbalized understanding. Discharged per EDMD with discharge instructions.         Mustapha Marie RN  09/09/21 0847

## 2021-09-13 ENCOUNTER — TELEPHONE (OUTPATIENT)
Dept: PRIMARY CARE CLINIC | Age: 41
End: 2021-09-13

## 2021-09-13 DIAGNOSIS — R42 VERTIGO: ICD-10-CM

## 2021-09-13 DIAGNOSIS — F60.3 BORDERLINE PERSONALITY DISORDER (HCC): ICD-10-CM

## 2021-09-13 DIAGNOSIS — G43.909 MIGRAINE WITHOUT STATUS MIGRAINOSUS, NOT INTRACTABLE, UNSPECIFIED MIGRAINE TYPE: ICD-10-CM

## 2021-09-13 DIAGNOSIS — J45.40 MODERATE PERSISTENT ASTHMA WITHOUT COMPLICATION: ICD-10-CM

## 2021-09-13 DIAGNOSIS — F31.9 BIPOLAR AFFECTIVE DISORDER, REMISSION STATUS UNSPECIFIED (HCC): ICD-10-CM

## 2021-09-13 DIAGNOSIS — R11.0 CHRONIC NAUSEA: ICD-10-CM

## 2021-09-13 NOTE — TELEPHONE ENCOUNTER
cayetano called from [facility] order called and need new script for   fluticasone-salmeterol (ADVAIR DISKUS) 500-50 MCG/DOSE diskus inhaler     meclizine (ANTIVERT) 12.5 MG tablet     ondansetron (ZOFRAN) 4 MG tablet [    SUMAtriptan (IMITREX) 50 MG tablet     albuterol sulfate HFA (PROVENTIL HFA) 108 (90 Base) MCG/ACT inhaler     Albuterol for nebulizer        FLUoxetine (PROZAC) 20 MG capsule     BIazetam 5mg

## 2021-09-14 RX ORDER — SUMATRIPTAN 50 MG/1
50 TABLET, FILM COATED ORAL
Qty: 9 TABLET | Refills: 1 | Status: SHIPPED | OUTPATIENT
Start: 2021-09-14 | End: 2022-04-20 | Stop reason: SDUPTHER

## 2021-09-14 RX ORDER — ONDANSETRON 4 MG/1
TABLET, FILM COATED ORAL
Qty: 30 TABLET | Refills: 0 | Status: SHIPPED | OUTPATIENT
Start: 2021-09-14 | End: 2021-09-27

## 2021-09-14 RX ORDER — MECLIZINE HCL 12.5 MG/1
12.5 TABLET ORAL 3 TIMES DAILY PRN
Qty: 30 TABLET | Refills: 0 | Status: SHIPPED | OUTPATIENT
Start: 2021-09-14 | End: 2021-09-27

## 2021-09-14 RX ORDER — ALBUTEROL SULFATE 90 UG/1
2 AEROSOL, METERED RESPIRATORY (INHALATION) EVERY 6 HOURS PRN
Qty: 18 G | Refills: 0 | Status: SHIPPED | OUTPATIENT
Start: 2021-09-14 | End: 2021-10-06

## 2021-09-14 RX ORDER — FLUOXETINE HYDROCHLORIDE 20 MG/1
60 CAPSULE ORAL DAILY
Qty: 270 CAPSULE | Refills: 1 | Status: SHIPPED | OUTPATIENT
Start: 2021-09-14 | End: 2022-04-19 | Stop reason: SDUPTHER

## 2021-09-16 DIAGNOSIS — J45.41 MODERATE PERSISTENT ASTHMA WITH EXACERBATION: ICD-10-CM

## 2021-09-16 DIAGNOSIS — B34.9 VIRAL ILLNESS: Primary | ICD-10-CM

## 2021-09-20 ENCOUNTER — HOSPITAL ENCOUNTER (OUTPATIENT)
Age: 41
Discharge: HOME | End: 2021-09-20
Payer: MEDICARE

## 2021-09-20 ENCOUNTER — HOSPITAL ENCOUNTER (OUTPATIENT)
Dept: GENERAL RADIOLOGY | Age: 41
Discharge: HOME | End: 2021-09-20
Payer: MEDICARE

## 2021-09-20 DIAGNOSIS — J45.41 MODERATE PERSISTENT ASTHMA WITH EXACERBATION: ICD-10-CM

## 2021-09-20 DIAGNOSIS — B34.9 VIRAL ILLNESS: ICD-10-CM

## 2021-09-20 PROCEDURE — 71046 X-RAY EXAM CHEST 2 VIEWS: CPT

## 2021-09-22 ENCOUNTER — OFFICE VISIT (OUTPATIENT)
Dept: PSYCHOLOGY | Age: 41
End: 2021-09-22
Payer: MEDICARE

## 2021-09-22 DIAGNOSIS — F43.10 PTSD (POST-TRAUMATIC STRESS DISORDER): Primary | ICD-10-CM

## 2021-09-22 PROCEDURE — 90832 PSYTX W PT 30 MINUTES: CPT | Performed by: PSYCHOLOGIST

## 2021-09-22 PROCEDURE — 1036F TOBACCO NON-USER: CPT | Performed by: PSYCHOLOGIST

## 2021-09-22 ASSESSMENT — PATIENT HEALTH QUESTIONNAIRE - PHQ9
SUM OF ALL RESPONSES TO PHQ QUESTIONS 1-9: 24
9. THOUGHTS THAT YOU WOULD BE BETTER OFF DEAD, OR OF HURTING YOURSELF: 0
10. IF YOU CHECKED OFF ANY PROBLEMS, HOW DIFFICULT HAVE THESE PROBLEMS MADE IT FOR YOU TO DO YOUR WORK, TAKE CARE OF THINGS AT HOME, OR GET ALONG WITH OTHER PEOPLE: 3
4. FEELING TIRED OR HAVING LITTLE ENERGY: 3
2. FEELING DOWN, DEPRESSED OR HOPELESS: 3
1. LITTLE INTEREST OR PLEASURE IN DOING THINGS: 3
3. TROUBLE FALLING OR STAYING ASLEEP: 3
5. POOR APPETITE OR OVEREATING: 3
SUM OF ALL RESPONSES TO PHQ QUESTIONS 1-9: 24
6. FEELING BAD ABOUT YOURSELF - OR THAT YOU ARE A FAILURE OR HAVE LET YOURSELF OR YOUR FAMILY DOWN: 3
8. MOVING OR SPEAKING SO SLOWLY THAT OTHER PEOPLE COULD HAVE NOTICED. OR THE OPPOSITE, BEING SO FIGETY OR RESTLESS THAT YOU HAVE BEEN MOVING AROUND A LOT MORE THAN USUAL: 3
SUM OF ALL RESPONSES TO PHQ QUESTIONS 1-9: 24
SUM OF ALL RESPONSES TO PHQ9 QUESTIONS 1 & 2: 6
7. TROUBLE CONCENTRATING ON THINGS, SUCH AS READING THE NEWSPAPER OR WATCHING TELEVISION: 3

## 2021-09-22 ASSESSMENT — ANXIETY QUESTIONNAIRES
2. NOT BEING ABLE TO STOP OR CONTROL WORRYING: 3-NEARLY EVERY DAY
1. FEELING NERVOUS, ANXIOUS, OR ON EDGE: 3-NEARLY EVERY DAY
6. BECOMING EASILY ANNOYED OR IRRITABLE: 3-NEARLY EVERY DAY
4. TROUBLE RELAXING: 3-NEARLY EVERY DAY
GAD7 TOTAL SCORE: 21
7. FEELING AFRAID AS IF SOMETHING AWFUL MIGHT HAPPEN: 3-NEARLY EVERY DAY
5. BEING SO RESTLESS THAT IT IS HARD TO SIT STILL: 3-NEARLY EVERY DAY
3. WORRYING TOO MUCH ABOUT DIFFERENT THINGS: 3-NEARLY EVERY DAY

## 2021-09-22 ASSESSMENT — COLUMBIA-SUICIDE SEVERITY RATING SCALE - C-SSRS
1. WITHIN THE PAST MONTH, HAVE YOU WISHED YOU WERE DEAD OR WISHED YOU COULD GO TO SLEEP AND NOT WAKE UP?: NO
6. HAVE YOU EVER DONE ANYTHING, STARTED TO DO ANYTHING, OR PREPARED TO DO ANYTHING TO END YOUR LIFE?: NO
2. HAVE YOU ACTUALLY HAD ANY THOUGHTS OF KILLING YOURSELF?: NO

## 2021-09-22 NOTE — PATIENT INSTRUCTIONS
1. Continue to schedule down time for self: fun with samuel as needed for anxiety management  2. Take valium as prescribed for sleep trouble due to court case zoom hearing on 9/24  3. Schedule any gardening and time outdoors for stress reduction any amount  4. Constructive rest daily for the next few days, before during and after court hearing  5. Schedule follow up with Dr General Lama: call [facility] office to do this  6.  Return to clinic for Dr Prashant Caldera in 2-4 weeks

## 2021-09-22 NOTE — PROGRESS NOTES
Behavioral Health Consultation Follow-up  Mohit Kebede PsyD  Psychologist  2021  3:24 PM      Time spent with Patient: 30 minutes  This is patient's second  [facility] appointment. Reason for Consult:  PTSD  Referring Provider: Jacinda Howard MD  [address]  [address],  [place]    Feedback given to PCP. S:    Last appt: . Overall was doing well until suspected COVID infection earlier this month. Symptoms for about 1 month but when tested, COVID negative. Major stressor approachinth anniversary of father's on , court date on . Coping with trauma: investing in video . Been reading more at bedtime to \"go to sleep\". Working on setting up pilates and elliptical machines for daily physical movement. Agreed at least 5-20 minutes daily. Gardening any amount, and will take valium prn for sleep trouble.  Agreed she may need to use this weekend post-court in regards to her father's case.       O:  MSE:    Appearance    alert, cooperative  Appetite abnormal: poor  Sleep disturbance Yes  Fatigue Yes  Loss of pleasure Yes  Impulsive behavior No  Speech    normal rate, normal volume and well articulated  Mood    Anxious  Angry  Depressed  Affect    normal affect  Thought Content    intact  Thought Process    linear, goal directed and coherent  Associations    logical connections  Insight    Good  Judgment    Intact  Orientation    oriented to person, place, time, and general circumstances  Memory    recent and remote memory intact  Attention/Concentration    intact  Morbid ideation No  Suicide Assessment    no suicidal ideation    History:    Medications:   Current Outpatient Medications   Medication Sig Dispense Refill    albuterol sulfate HFA (PROVENTIL HFA) 108 (90 Base) MCG/ACT inhaler Inhale 2 puffs into the lungs every 6 hours as needed for Wheezing or Shortness of Breath 18 g 0    meclizine (ANTIVERT) 12.5 MG tablet Take 1 tablet by mouth 3 times daily as needed for Dizziness 30 tablet 0    ondansetron (ZOFRAN) 4 MG tablet TAKE ONE TABLET BY MOUTH EVERY 8 HOURS AS NEEDED FOR NAUSEA AND VOMITING 30 tablet 0    SUMAtriptan (IMITREX) 50 MG tablet Take 1 tablet by mouth once as needed for Migraine 9 tablet 1    FLUoxetine (PROZAC) 20 MG capsule Take 3 capsules by mouth daily 270 capsule 1    fluticasone-salmeterol (ADVAIR DISKUS) 500-50 MCG/DOSE diskus inhaler Inhale 1 puff into the lungs every 12 hours 180 each 3    loratadine (CLARITIN) 10 MG tablet Take 1 tablet by mouth daily 90 tablet 1     No current facility-administered medications for this visit. Social History:   Social History     Socioeconomic History    Marital status: Single     Spouse name: Not on file    Number of children: Not on file    Years of education: Not on file    Highest education level: Not on file   Occupational History    Not on file   Tobacco Use    Smoking status: Never Smoker    Smokeless tobacco: Never Used   Vaping Use    Vaping Use: Never used   Substance and Sexual Activity    Alcohol use: Yes     Comment: occasional    Drug use: No    Sexual activity: Yes     Partners: Male   Other Topics Concern    Not on file   Social History Narrative    Not on file     Social Determinants of Health     Financial Resource Strain: High Risk    Difficulty of Paying Living Expenses: Hard   Food Insecurity: Food Insecurity Present    Worried About Running Out of Food in the Last Year: Sometimes true    Thuy of Food in the Last Year: Sometimes true   Transportation Needs:     Lack of Transportation (Medical):      Lack of Transportation (Non-Medical):    Physical Activity:     Days of Exercise per Week:     Minutes of Exercise per Session:    Stress:     Feeling of Stress :    Social Connections:     Frequency of Communication with Friends and Family:     Frequency of Social Gatherings with Friends and Family:     Attends Yarsanism Services:     Active Member of Clubs or Organizations:     Attends Club or Organization Meetings:     Marital Status:    Intimate Partner Violence:     Fear of Current or Ex-Partner:     Emotionally Abused:     Physically Abused:     Sexually Abused:        TOBACCO:   reports that she has never smoked. She has never used smokeless tobacco.  ETOH:   reports current alcohol use. Family History:   Family History   Problem Relation Age of Onset    Other Mother         autoimmune dz    Other Father         cirrhosis    Substance Abuse Father     Cancer Father      A:    See S: above    PHQ Scores 9/22/2021 6/10/2021 12/13/2019 10/22/2019 8/13/2019 7/22/2019 6/25/2019   PHQ2 Score 6 6 6 6 6 6 6   PHQ9 Score 24 27 24 24 27 26 21     Interpretation of Total Score Depression Severity: 1-4 = Minimal depression, 5-9 = Mild depression, 10-14 = Moderate depression, 15-19 = Moderately severe depression, 20-27 = Severe depression    BONITA 7 SCORE 9/22/2021 6/10/2021 12/13/2019 8/13/2019 7/22/2019 6/25/2019 9/25/2018   BONITA-7 Total Score 21 21 21 21 21 20 21     Interpretation of BONITA-7 score: 5-9 = mild anxiety, 10-14 = moderate anxiety, 15+ = severe anxiety. Recommend referral to behavioral health for scores 10 or greater.     Safety: denied any si/hi risk, intent, or plan     Diagnosis:    PTSD      Diagnosis Date    Asthma     Back pain     Bilateral ovarian cysts     Bipolar 1 disorder (HCC)     Cancer (HCC)     adenoca in situ cervix    DDD (degenerative disc disease), lumbar     Headache(784.0)     IBS (irritable bowel syndrome)     Multiple benign melanocytic nevi 4/10/2019    OCD (obsessive compulsive disorder)     PTSD (post-traumatic stress disorder)     Vertigo      Problems with primary support group, Problems related to the social environment and Other psychosocial and environmental problems    Plan:  Pt interventions:    Practiced assertive communication, Trained in strategies for increasing balanced thinking, Discussed self-care (sleep, nutrition, rewarding activities, social support, exercise) and Supportive techniques    Pt Behavioral Change Plan:    1. Continue to schedule down time for self: fun with samuel as needed for anxiety management  2. Take valium as prescribed for sleep trouble due to court case zoom hearing on 9/24  3. Schedule any gardening and time outdoors for stress reduction any amount  4. Constructive rest daily for the next few days, before during and after court hearing  5. Schedule follow up with Dr Drew Elkins: call [facility] office to do this  6.  Return to clinic for Dr Abel Gross in 2-4 weeks

## 2021-09-23 NOTE — PROGRESS NOTES
2021    TELEHEALTH EVALUATION -- Audio/Visual (During ALWRN-87 public health emergency)    HPI:    Nicolas Bentley (:  1980) has requested an audio/video evaluation for the following concern(s):    Following with [facility]. Restarted prozac 3 months ago for borderline personality disorder and bipolar disorder. Seen virtually on 21 for URI symptoms and script written for prednisone. Today she states the prednisone helped a lot. She coughed up the mucus and slept well. She feels back on track. No fevers, not using neb as much and cough is only here and there. Her asthma treatment is going well and she feels controlled ans stable. Review of Systems   Constitutional: Negative for appetite change, chills, fatigue and fever. HENT: Negative for congestion. Eyes: Negative for pain and visual disturbance. Respiratory: Negative for cough and shortness of breath. Cardiovascular: Negative for chest pain and palpitations. Gastrointestinal: Negative for abdominal pain, constipation and diarrhea. Genitourinary: Negative for difficulty urinating. Musculoskeletal: Negative for arthralgias. Skin: Negative for rash and wound. Neurological: Negative for dizziness, weakness, light-headedness and headaches. Hematological: Does not bruise/bleed easily. Psychiatric/Behavioral: Negative for behavioral problems. Prior to Visit Medications    Medication Sig Taking?  Authorizing Provider   albuterol sulfate HFA (PROVENTIL HFA) 108 (90 Base) MCG/ACT inhaler Inhale 2 puffs into the lungs every 6 hours as needed for Wheezing or Shortness of Breath  Iker Briggs MD   meclizine (ANTIVERT) 12.5 MG tablet Take 1 tablet by mouth 3 times daily as needed for Dizziness  Iker Briggs MD   ondansetron (ZOFRAN) 4 MG tablet TAKE ONE TABLET BY MOUTH EVERY 8 HOURS AS NEEDED FOR NAUSEA AND VOMITING  Iker Briggs MD   SUMAtriptan (IMITREX) 50 MG tablet Take 1 tablet by mouth once as needed for Migraine  Michael Diana MD   FLUoxetine (PROZAC) 20 MG capsule Take 3 capsules by mouth daily  Michael Diana MD   fluticasone-salmeterol (ADVAIR DISKUS) 500-50 MCG/DOSE diskus inhaler Inhale 1 puff into the lungs every 12 hours  Michael Diana MD   loratadine (CLARITIN) 10 MG tablet Take 1 tablet by mouth daily  Michael Diana MD       Social History     Tobacco Use    Smoking status: Never Smoker    Smokeless tobacco: Never Used   Vaping Use    Vaping Use: Never used   Substance Use Topics    Alcohol use: Yes     Comment: occasional    Drug use: No        Allergies   Allergen Reactions    Amitriptyline      Bipolar reaction   ,   Past Medical History:   Diagnosis Date    Asthma     Back pain     Bilateral ovarian cysts     [facility]     [facility]     adenoca in situ cervix    DDD (degenerative disc disease), lumbar     Headache(784.0)     IBS (irritable bowel syndrome)     Multiple benign melanocytic nevi 4/10/2019    OCD (obsessive compulsive disorder)     PTSD (post-traumatic stress disorder)     Vertigo    ,   Past Surgical History:   Procedure Laterality Date    CERVIX BIOPSY      HYSTERECTOMY VAGINAL  02/22/2017    HYSTERECTOMY, VAGINAL      WISDOM TOOTH EXTRACTION     ,   Social History     Tobacco Use    Smoking status: Never Smoker    Smokeless tobacco: Never Used   Vaping Use    Vaping Use: Never used   Substance Use Topics    Alcohol use: Yes     Comment: occasional    Drug use: No   ,   Family History   Problem Relation Age of Onset    Other Mother         autoimmune dz    Other Father         cirrhosis    Substance Abuse Father     Cancer Father    ,   Immunization History   Administered Date(s) Administered    COVID-19, HitchedPic, PF, 30mcg/0.3mL 04/07/2021, 05/19/2021    Influenza Vaccine, unspecified formulation 10/23/2014    Influenza Virus Vaccine 09/25/2012, 10/23/2014, 12/03/2015, 12/19/2016, 09/13/2017    inhalers and allergy treatment  She is stable now  Discussed CXR and that it was unremarkable  All questions answered    3. Bipolar affective disorder, remission status unspecified ([facility]  Continue BH and prozac    4. Borderline personality disorder ([facility]  Continue BH and prozac      No follow-ups on file. Future Appointments   Date Time Provider Mina De La Cruzi   9/24/2021  1:45 PM MD PA Mcknight  [place] [facility]   10/13/2021  2:30 PM GENA Cruz  [facility]   12/15/2021  3:30 PM MD PA Mcknight  [place] [facility]       An  electronic signature was used to authenticate this note. --Lola Brumfield MD on 9/24/2021 at 1:18 PM    {Coding Help -- Use CPT 46413-47576 with EM elements or Time rules for Office Visits. :      11 to 15 minutes were spent on the digital evaluation and management of this patient. Pursuant to the emergency declaration under the [facility] waiver authority and the [employer] and [employer] Act, this Virtual  Visit was conducted, with patient's consent, to reduce the patient's risk of exposure to COVID-19 and provide continuity of care for an established patient. Services were provided through a video synchronous discussion virtually to substitute for in-person clinic visit.

## 2021-09-24 ENCOUNTER — VIRTUAL VISIT (OUTPATIENT)
Dept: PRIMARY CARE CLINIC | Age: 41
End: 2021-09-24
Payer: MEDICARE

## 2021-09-24 DIAGNOSIS — B34.9 VIRAL ILLNESS: Primary | ICD-10-CM

## 2021-09-24 DIAGNOSIS — R11.0 CHRONIC NAUSEA: ICD-10-CM

## 2021-09-24 DIAGNOSIS — R42 VERTIGO: ICD-10-CM

## 2021-09-24 DIAGNOSIS — F60.3 BORDERLINE PERSONALITY DISORDER (HCC): ICD-10-CM

## 2021-09-24 DIAGNOSIS — J45.41 MODERATE PERSISTENT ASTHMA WITH EXACERBATION: ICD-10-CM

## 2021-09-24 DIAGNOSIS — F31.9 BIPOLAR AFFECTIVE DISORDER, REMISSION STATUS UNSPECIFIED (HCC): ICD-10-CM

## 2021-09-24 PROCEDURE — G8417 CALC BMI ABV UP PARAM F/U: HCPCS | Performed by: FAMILY MEDICINE

## 2021-09-24 PROCEDURE — 99213 OFFICE O/P EST LOW 20 MIN: CPT | Performed by: FAMILY MEDICINE

## 2021-09-24 PROCEDURE — G8427 DOCREV CUR MEDS BY ELIG CLIN: HCPCS | Performed by: FAMILY MEDICINE

## 2021-09-24 PROCEDURE — 1036F TOBACCO NON-USER: CPT | Performed by: FAMILY MEDICINE

## 2021-09-24 PROCEDURE — VIRTUALHLTH VIRTUAL HEALTH SAME DAY: Performed by: FAMILY MEDICINE

## 2021-09-24 ASSESSMENT — ENCOUNTER SYMPTOMS
ABDOMINAL PAIN: 0
DIARRHEA: 0
CONSTIPATION: 0
EYE PAIN: 0
COUGH: 0
SHORTNESS OF BREATH: 0

## 2021-09-27 RX ORDER — MECLIZINE HCL 12.5 MG/1
TABLET ORAL
Qty: 30 TABLET | Refills: 0 | Status: SHIPPED | OUTPATIENT
Start: 2021-09-27 | End: 2021-10-06

## 2021-09-27 RX ORDER — ONDANSETRON 4 MG/1
TABLET, FILM COATED ORAL
Qty: 30 TABLET | Refills: 0 | Status: SHIPPED | OUTPATIENT
Start: 2021-09-27 | End: 2021-10-06

## 2021-10-05 DIAGNOSIS — R11.0 CHRONIC NAUSEA: ICD-10-CM

## 2021-10-05 DIAGNOSIS — R42 VERTIGO: ICD-10-CM

## 2021-10-06 RX ORDER — ALBUTEROL SULFATE 90 UG/1
AEROSOL, METERED RESPIRATORY (INHALATION)
Qty: 1 EACH | Refills: 5 | Status: SHIPPED | OUTPATIENT
Start: 2021-10-06 | End: 2022-04-20 | Stop reason: SDUPTHER

## 2021-10-06 RX ORDER — MECLIZINE HCL 12.5 MG/1
TABLET ORAL
Qty: 30 TABLET | Refills: 0 | Status: SHIPPED | OUTPATIENT
Start: 2021-10-06 | End: 2022-04-20 | Stop reason: SDUPTHER

## 2021-10-06 RX ORDER — ONDANSETRON 4 MG/1
TABLET, FILM COATED ORAL
Qty: 30 TABLET | Refills: 0 | Status: SHIPPED | OUTPATIENT
Start: 2021-10-06 | End: 2022-04-20 | Stop reason: SDUPTHER

## 2021-10-13 ENCOUNTER — VIRTUAL VISIT (OUTPATIENT)
Dept: PSYCHOLOGY | Age: 41
End: 2021-10-13
Payer: MEDICARE

## 2021-10-13 DIAGNOSIS — F43.10 PTSD (POST-TRAUMATIC STRESS DISORDER): Primary | ICD-10-CM

## 2021-10-13 PROCEDURE — 98968 PH1 ASSMT&MGMT NQHP 21-30: CPT | Performed by: PSYCHOLOGIST

## 2021-10-13 NOTE — PROGRESS NOTES
Behavioral Health Consultation Follow-up  Monica Marino PsyD  Psychologist  10/13/2021  2:43 PM      NOTE - this visit was not able to be conducted via audiovisual means : MyChart, Doxy, etc, in accordance with CMS guidelines so a virtual telephone visit was completed. During RMXOI-02 public health emergency. Visit initiated at patient or caregiver request with permission to bill to insurer granted. Telemedicine APA guidelines were reviewed and discussed. Patient gave verbal consent for teleservices and will sign a consent form when feasible. Location of provider: [facility]  Location of patient: home    Time spent with Patient: 30 minutes  This is patient's third  [facility] appointment. Reason for Consult:  PTSD  Referring Provider: Angelica Alejo MD  [address] [place] [address] [employer]  [facility] [address],  [facility] [address]    Feedback given to PCP. S:    Last appt: 9/22. Will wait until December until referral to psychiatry can happen. anxious about going to police with mother this Friday. Had to gather all of the paperwork to make statement. Rehearsed how to prepare for this including: grounding, connection, and aromotherapy.      Next Monday, ultrasound: found lump in breast.     O:  MSE:    Appetite abnormal: poor  Sleep disturbance Yes  Fatigue Yes  Loss of pleasure Yes  Impulsive behavior No  Speech    normal rate, normal volume and well articulated  Mood    Anxiety up with approaching report, stable, managing   Thought Content    intact  Thought Process    linear, goal directed and coherent  Associations    logical connections  Insight    Good  Judgment    Intact  Orientation    oriented to person, place, time, and general circumstances  Memory    recent and remote memory intact  Attention/Concentration    intact  Morbid ideation No  Suicide Assessment    no suicidal ideation      History:    Medications:   Current Outpatient Medications   Medication Sig Dispense Refill    albuterol sulfate  (90 Base) MCG/ACT inhaler INHALE 2 PUFFS EVERY 6 HOURS AS NEEDED FOR WHEEZING OR SHORTNESS OF BREATH 1 each 5    ondansetron (ZOFRAN) 4 MG tablet TAKE 1 TABLET EVERY 8 HOURS AS NEEDED FOR NAUSEA AND VOMITING 30 tablet 0    meclizine (ANTIVERT) 12.5 MG tablet TAKE 1 TABLET  3 TIMES DAILY AS NEEDED FOR DIZZINESS 30 tablet 0    SUMAtriptan (IMITREX) 50 MG tablet Take 1 tablet by mouth once as needed for Migraine 9 tablet 1    FLUoxetine (PROZAC) 20 MG capsule Take 3 capsules by mouth daily 270 capsule 1    fluticasone-salmeterol (ADVAIR DISKUS) 500-50 MCG/DOSE diskus inhaler Inhale 1 puff into the lungs every 12 hours 180 each 3    loratadine (CLARITIN) 10 MG tablet Take 1 tablet by mouth daily 90 tablet 1     No current facility-administered medications for this visit. Social History:   Social History     Socioeconomic History    Marital status: Single     Spouse name: Not on file    Number of children: Not on file    Years of education: Not on file    Highest education level: Not on file   Occupational History    Not on file   Tobacco Use    Smoking status: Never Smoker    Smokeless tobacco: Never Used   Vaping Use    Vaping Use: Never used   Substance and Sexual Activity    Alcohol use: Yes     Comment: occasional    Drug use: No    Sexual activity: Yes     Partners: Male   Other Topics Concern    Not on file   Social History Narrative    Not on file     Social Determinants of Health     Financial Resource Strain: High Risk    Difficulty of Paying Living Expenses: Hard   Food Insecurity: Food Insecurity Present    Worried About Running Out of Food in the Last Year: Sometimes true    Thuy of Food in the Last Year: Sometimes true   Transportation Needs:     Lack of Transportation (Medical):      Lack of Transportation (Non-Medical):    Physical Activity:     Days of Exercise per Week:     Minutes of Exercise per Session:    Stress:     Feeling of Stress :    Social Connections:     Frequency of Communication with Friends and Family:     Frequency of Social Gatherings with Friends and Family:     Attends Faith Services:     Active Member of Clubs or Organizations:     Attends Club or Organization Meetings:     Marital Status:    Intimate Partner Violence:     Fear of Current or Ex-Partner:     Emotionally Abused:     Physically Abused:     Sexually Abused:        TOBACCO:   reports that she has never smoked. She has never used smokeless tobacco.  ETOH:   reports current alcohol use. Family History:   Family History   Problem Relation Age of Onset    Other Mother         autoimmune dz    Other Father         cirrhosis    Substance Abuse Father     Cancer Father          A:  See S: above     Diagnosis:    PTSD      Diagnosis Date    Asthma     Back pain     Bilateral ovarian cysts     Bipolar 1 disorder ([facility]     Cancer ([facility]     adenoca in situ cervix    DDD (degenerative disc disease), lumbar     Headache(784.0)     IBS (irritable bowel syndrome)     Multiple benign melanocytic nevi 4/10/2019    OCD (obsessive compulsive disorder)     PTSD (post-traumatic stress disorder)     Vertigo      Problems with primary support group, Problems related to the social environment and Problems related to interaction with the legal system/ crime    Safety:  Denied any si/hi risk, intent, or plan     Plan:  Pt interventions:    Practiced assertive communication    Pt Behavioral Change Plan:    1. Prepare PTSD emotional tools to take to police station  2. Rehearse what you want to say for police report: bring notes  3. Make breakfast for mother and grandmother for relaxation  4.  Return to clinic for Dr Yaya Dewitt in 1 month

## 2021-10-15 NOTE — PATIENT INSTRUCTIONS
1. Prepare PTSD emotional tools to take to police station  2. Rehearse what you want to say for police report: bring notes  3. Make breakfast for mother and grandmother for relaxation  4.  Return to clinic for Dr Honey Torres in 1 month

## 2021-12-08 ENCOUNTER — VIRTUAL VISIT (OUTPATIENT)
Dept: PSYCHOLOGY | Age: 41
End: 2021-12-08
Payer: MEDICARE

## 2021-12-08 DIAGNOSIS — F43.10 PTSD (POST-TRAUMATIC STRESS DISORDER): Primary | ICD-10-CM

## 2021-12-08 PROCEDURE — 98968 PH1 ASSMT&MGMT NQHP 21-30: CPT | Performed by: PSYCHOLOGIST

## 2021-12-08 NOTE — PATIENT INSTRUCTIONS
1. Continue to keep slow, steady, driving in the \"slow lucille\" pace over the next week  2. Validate righteous anger towards mother for her ongoing \"issues\" that continue to impact you, but redirect in self-care manner  3. Continue to schedule down time for self: \"cuddling with dog\", playing video games for fun, etc  4.  Return to clinic for Dr Pennie Caballero in 2 weeks, 12/23 at 1 pm in person
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
## 2022-02-10 ENCOUNTER — OFFICE VISIT (OUTPATIENT)
Dept: PSYCHOLOGY | Age: 42
End: 2022-02-10
Payer: MEDICARE

## 2022-02-10 DIAGNOSIS — F43.10 PTSD (POST-TRAUMATIC STRESS DISORDER): Primary | ICD-10-CM

## 2022-02-10 PROCEDURE — 90832 PSYTX W PT 30 MINUTES: CPT | Performed by: PSYCHOLOGIST

## 2022-02-10 PROCEDURE — 1036F TOBACCO NON-USER: CPT | Performed by: PSYCHOLOGIST

## 2022-02-10 NOTE — PROGRESS NOTES
Behavioral Health Consultation Follow-up  Yaya Hoover PsyD  Psychologist  2/10/2022  4:14 PM      Time spent with Patient: 30 minutes  This is patient's sixth  [facility] appointment. Reason for Consult:  PTSD  Referring Provider: Hermelindo Bangura MD  [address]  [address],  [address]    Feedback given to PCP. S:    Last appt: 12/23/21. Mother and grandmother finally moved in on 2/2 which is great news but lots of boundary issues with uncle and other family members. Focused on ways she can manage these boundaries as pt and their newly blended family adjust in her home.      O:  MSE:    Appearance    alert, cooperative  Appetite abnormal: poor  Sleep disturbance okay  Fatigue Yes  Loss of pleasure Yes  Impulsive behavior No  Speech    normal rate, normal volume and well articulated  Mood    Stable, managing   Affect    normal affect  Thought Content    intact  Thought Process    linear, goal directed and coherent  Associations    logical connections  Insight    Good  Judgment    Intact  Orientation    oriented to person, place, time, and general circumstances  Memory    recent and remote memory intact  Attention/Concentration    intact  Morbid ideation No  Suicide Assessment    no suicidal ideation    History:    Medications:   Current Outpatient Medications   Medication Sig Dispense Refill    albuterol sulfate  (90 Base) MCG/ACT inhaler INHALE 2 PUFFS EVERY 6 HOURS AS NEEDED FOR WHEEZING OR SHORTNESS OF BREATH 1 each 5    ondansetron (ZOFRAN) 4 MG tablet TAKE 1 TABLET EVERY 8 HOURS AS NEEDED FOR NAUSEA AND VOMITING 30 tablet 0    meclizine (ANTIVERT) 12.5 MG tablet TAKE 1 TABLET  3 TIMES DAILY AS NEEDED FOR DIZZINESS 30 tablet 0    SUMAtriptan (IMITREX) 50 MG tablet Take 1 tablet by mouth once as needed for Migraine 9 tablet 1    FLUoxetine (PROZAC) 20 MG capsule Take 3 capsules by mouth daily 270 capsule 1    fluticasone-salmeterol (ADVAIR DISKUS) 500-50 MCG/DOSE diskus inhaler Inhale 1 puff into the lungs every 12 hours 180 each 3    loratadine (CLARITIN) 10 MG tablet Take 1 tablet by mouth daily 90 tablet 1     No current facility-administered medications for this visit. Social History:   Social History     Socioeconomic History    Marital status: Single     Spouse name: Not on file    Number of children: Not on file    Years of education: Not on file    Highest education level: Not on file   Occupational History    Not on file   Tobacco Use    Smoking status: Never Smoker    Smokeless tobacco: Never Used   Vaping Use    Vaping Use: Never used   Substance and Sexual Activity    Alcohol use: Yes     Comment: occasional    Drug use: No    Sexual activity: Yes     Partners: Male   Other Topics Concern    Not on file   Social History Narrative    Not on file     Social Determinants of Health     Financial Resource Strain: High Risk    Difficulty of Paying Living Expenses: Hard   Food Insecurity: Food Insecurity Present    Worried About Running Out of Food in the Last Year: Sometimes true    Thuy of Food in the Last Year: Sometimes true   Transportation Needs:     Lack of Transportation (Medical): Not on file    Lack of Transportation (Non-Medical):  Not on file   Physical Activity:     Days of Exercise per Week: Not on file    Minutes of Exercise per Session: Not on file   Stress:     Feeling of Stress : Not on file   Social Connections:     Frequency of Communication with Friends and Family: Not on file    Frequency of Social Gatherings with Friends and Family: Not on file    Attends Quaker Services: Not on file    Active Member of Clubs or Organizations: Not on file    Attends Club or Organization Meetings: Not on file    Marital Status: Not on file   Intimate Partner Violence:     Fear of Current or Ex-Partner: Not on file    Emotionally Abused: Not on file    Physically Abused: Not on file    Sexually Abused: Not on file   Housing Stability:  Unable to Pay for Housing in the Last Year: Not on file    Number of Places Lived in the Last Year: Not on file    Unstable Housing in the Last Year: Not on file       TOBACCO:   reports that she has never smoked. She has never used smokeless tobacco.  ETOH:   reports current alcohol use. Family History:   Family History   Problem Relation Age of Onset    Other Mother         autoimmune dz    Other Father         cirrhosis    Substance Abuse Father     Cancer Father        A:    See S: above    PHQ Scores 9/22/2021 6/10/2021 12/13/2019 10/22/2019 8/13/2019 7/22/2019 6/25/2019   PHQ2 Score 6 6 6 6 6 6 6   PHQ9 Score 24 27 24 24 27 26 21     Interpretation of Total Score Depression Severity: 1-4 = Minimal depression, 5-9 = Mild depression, 10-14 = Moderate depression, 15-19 = Moderately severe depression, 20-27 = Severe depression    BONITA 7 SCORE 9/22/2021 6/10/2021 12/13/2019 8/13/2019 7/22/2019 6/25/2019 9/25/2018   BONITA-7 Total Score 21 21 21 21 21 20 21     Interpretation of BONITA-7 score: 5-9 = mild anxiety, 10-14 = moderate anxiety, 15+ = severe anxiety. Recommend referral to behavioral health for scores 10 or greater. Safety: denied any si/hi risk    Diagnosis:    PTSD      Diagnosis Date    Asthma     Back pain     Bilateral ovarian cysts     Bipolar 1 disorder (HCC)     Cancer (HCC)     adenoca in situ cervix    DDD (degenerative disc disease), lumbar     Headache(784.0)     IBS (irritable bowel syndrome)     Multiple benign melanocytic nevi 4/10/2019    OCD (obsessive compulsive disorder)     PTSD (post-traumatic stress disorder)     Vertigo      Problems with primary support group and Problems related to the social environment    Plan:  Pt interventions:    Practiced assertive communication, Trained in strategies for increasing balanced thinking and Discussed self-care (sleep, nutrition, rewarding activities, social support, exercise)    Pt Behavioral Change Plan:    1.  Continue to keep slow, steady, driving in the \"slow lucille\" as family adjusts to living together  2. Continue to validate righteous anger towards mother for her ongoing \"issues\" that continue to impact you, but redirect in self-care manner  3. Continue to schedule down time for self: \"cuddling with dog\", playing video games for fun, etc for stress reduction  4. Use \"planned avoidance\" skill to support managing any potential conflict with family members in the home  5. Schedule new patient psychiatry appointment since Dr Zachery Valles leaving [facility]   6.  Return to clinic for Dr Jesse Fitzgerald in 3 weeks

## 2022-02-10 NOTE — PATIENT INSTRUCTIONS
1. Continue to keep slow, steady, driving in the \"slow lucille\" as family adjusts to living together  2. Continue to validate righteous anger towards mother for her ongoing \"issues\" that continue to impact you, but redirect in self-care manner  3. Continue to schedule down time for self: \"cuddling with dog\", playing video games for fun, etc for stress reduction  4. Use \"planned avoidance\" skill to support managing any potential conflict with family members in the home  5. Schedule new patient psychiatry appointment since Dr Carson Arango leaving [facility]   6.  Return to clinic for Dr Ezequiel Dalton in 3 weeks

## 2022-03-03 ENCOUNTER — OFFICE VISIT (OUTPATIENT)
Dept: PSYCHOLOGY | Age: 42
End: 2022-03-03

## 2022-03-03 DIAGNOSIS — F43.10 PTSD (POST-TRAUMATIC STRESS DISORDER): Primary | ICD-10-CM

## 2022-03-03 PROCEDURE — 99999 PR OFFICE/OUTPT VISIT,PROCEDURE ONLY: CPT | Performed by: PSYCHOLOGIST

## 2022-03-03 NOTE — PROGRESS NOTES
Behavioral Health Consultation Follow-up  Kaci Mendez PsyD  Psychologist  3/3/2022  3:08 PM      NOTE - this visit was not able to be conducted via audiovisual means : MyClydiat, Doxy, etc, in accordance with CMS guidelines due to video trouble. So telephone visit completed. During EXCOO-63 public health emergency. The patient (or guardian if applicable) is aware that this is a billable service, which includes applicable co-pays. This Virtual Visit was conducted with patient's (and/or legal guardian's) consent. Telemedicine APA guidelines were reviewed and discussed. The patient was located in a state where the provider was licensed to provide care. [facility] of provider: [facility]  Location of patient: home    Last appt: 2/10. Tried to call,  but could not leave message due to  mailbox being \"full\". 3 x, no answer. Left my chart message with pt to reschedule.

## 2022-03-24 ENCOUNTER — OFFICE VISIT (OUTPATIENT)
Dept: PSYCHOLOGY | Age: 42
End: 2022-03-24
Payer: MEDICARE

## 2022-03-24 DIAGNOSIS — F43.10 PTSD (POST-TRAUMATIC STRESS DISORDER): Primary | ICD-10-CM

## 2022-03-24 PROCEDURE — 1036F TOBACCO NON-USER: CPT | Performed by: PSYCHOLOGIST

## 2022-03-24 PROCEDURE — 90832 PSYTX W PT 30 MINUTES: CPT | Performed by: PSYCHOLOGIST
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
## 2022-03-24 NOTE — PATIENT INSTRUCTIONS
1. Continue to keep slow, steady, driving in the \"slow lucille\" as family adjusts to living together  2. Continue to validate righteous anger towards mother for her ongoing \"issues\" that continue to impact you, but redirect in self-care manner  3. Continue to schedule down time for self: \"cuddling with dog\", playing video games for fun, etc for stress reduction  4. Use \"planned avoidance\" skill to support managing any potential conflict with family members in the home  5. Continue to keep physical boundary with mother not coming upstairs due to \"safety\" [emotional safety for you]  6. Continue to schedule \"down\" days as needed, without guilt 1-2 x per week \" off duty\" day for stress reduction  8.  Return to clinic for Dr Jo Mckinley in 3 weeks will call with schedule after move around

## 2022-04-19 ENCOUNTER — OFFICE VISIT (OUTPATIENT)
Dept: PSYCHIATRY | Age: 42
End: 2022-04-19
Payer: MEDICARE

## 2022-04-19 DIAGNOSIS — F60.3 BORDERLINE PERSONALITY DISORDER (HCC): ICD-10-CM

## 2022-04-19 DIAGNOSIS — F41.0 GENERALIZED ANXIETY DISORDER WITH PANIC ATTACKS: Primary | ICD-10-CM

## 2022-04-19 DIAGNOSIS — F41.1 GENERALIZED ANXIETY DISORDER WITH PANIC ATTACKS: Primary | ICD-10-CM

## 2022-04-19 PROCEDURE — G8428 CUR MEDS NOT DOCUMENT: HCPCS | Performed by: STUDENT IN AN ORGANIZED HEALTH CARE EDUCATION/TRAINING PROGRAM

## 2022-04-19 PROCEDURE — 1036F TOBACCO NON-USER: CPT | Performed by: STUDENT IN AN ORGANIZED HEALTH CARE EDUCATION/TRAINING PROGRAM

## 2022-04-19 PROCEDURE — 99204 OFFICE O/P NEW MOD 45 MIN: CPT | Performed by: STUDENT IN AN ORGANIZED HEALTH CARE EDUCATION/TRAINING PROGRAM

## 2022-04-19 PROCEDURE — G8417 CALC BMI ABV UP PARAM F/U: HCPCS | Performed by: STUDENT IN AN ORGANIZED HEALTH CARE EDUCATION/TRAINING PROGRAM

## 2022-04-19 RX ORDER — FLUOXETINE HYDROCHLORIDE 40 MG/1
80 CAPSULE ORAL DAILY
Qty: 60 CAPSULE | Refills: 2 | Status: SHIPPED | OUTPATIENT
Start: 2022-04-19 | End: 2022-07-26

## 2022-04-19 ASSESSMENT — PATIENT HEALTH QUESTIONNAIRE - PHQ9
9. THOUGHTS THAT YOU WOULD BE BETTER OFF DEAD, OR OF HURTING YOURSELF: 3
5. POOR APPETITE OR OVEREATING: 3
10. IF YOU CHECKED OFF ANY PROBLEMS, HOW DIFFICULT HAVE THESE PROBLEMS MADE IT FOR YOU TO DO YOUR WORK, TAKE CARE OF THINGS AT HOME, OR GET ALONG WITH OTHER PEOPLE: 3
SUM OF ALL RESPONSES TO PHQ9 QUESTIONS 1 & 2: 6
4. FEELING TIRED OR HAVING LITTLE ENERGY: 3
SUM OF ALL RESPONSES TO PHQ QUESTIONS 1-9: 27
SUM OF ALL RESPONSES TO PHQ QUESTIONS 1-9: 27
1. LITTLE INTEREST OR PLEASURE IN DOING THINGS: 3
8. MOVING OR SPEAKING SO SLOWLY THAT OTHER PEOPLE COULD HAVE NOTICED. OR THE OPPOSITE, BEING SO FIGETY OR RESTLESS THAT YOU HAVE BEEN MOVING AROUND A LOT MORE THAN USUAL: 3
6. FEELING BAD ABOUT YOURSELF - OR THAT YOU ARE A FAILURE OR HAVE LET YOURSELF OR YOUR FAMILY DOWN: 3
3. TROUBLE FALLING OR STAYING ASLEEP: 3
SUM OF ALL RESPONSES TO PHQ QUESTIONS 1-9: 24
2. FEELING DOWN, DEPRESSED OR HOPELESS: 3
SUM OF ALL RESPONSES TO PHQ QUESTIONS 1-9: 27
7. TROUBLE CONCENTRATING ON THINGS, SUCH AS READING THE NEWSPAPER OR WATCHING TELEVISION: 3

## 2022-04-19 ASSESSMENT — ANXIETY QUESTIONNAIRES
1. FEELING NERVOUS, ANXIOUS, OR ON EDGE: 3
5. BEING SO RESTLESS THAT IT IS HARD TO SIT STILL: 3
7. FEELING AFRAID AS IF SOMETHING AWFUL MIGHT HAPPEN: 3
4. TROUBLE RELAXING: 3
6. BECOMING EASILY ANNOYED OR IRRITABLE: 3
2. NOT BEING ABLE TO STOP OR CONTROL WORRYING: 3
3. WORRYING TOO MUCH ABOUT DIFFERENT THINGS: 3
GAD7 TOTAL SCORE: 21
IF YOU CHECKED OFF ANY PROBLEMS ON THIS QUESTIONNAIRE, HOW DIFFICULT HAVE THESE PROBLEMS MADE IT FOR YOU TO DO YOUR WORK, TAKE CARE OF THINGS AT HOME, OR GET ALONG WITH OTHER PEOPLE: EXTREMELY DIFFICULT
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
## 2022-04-20 ENCOUNTER — TELEMEDICINE (OUTPATIENT)
Dept: PRIMARY CARE CLINIC | Age: 42
End: 2022-04-20

## 2022-04-20 DIAGNOSIS — R11.0 CHRONIC NAUSEA: ICD-10-CM

## 2022-04-20 DIAGNOSIS — J45.40 MODERATE PERSISTENT ASTHMA WITHOUT COMPLICATION: ICD-10-CM

## 2022-04-20 DIAGNOSIS — R42 VERTIGO: ICD-10-CM

## 2022-04-20 DIAGNOSIS — J30.9 ALLERGIC RHINITIS, UNSPECIFIED SEASONALITY, UNSPECIFIED TRIGGER: ICD-10-CM

## 2022-04-20 DIAGNOSIS — G43.909 MIGRAINE WITHOUT STATUS MIGRAINOSUS, NOT INTRACTABLE, UNSPECIFIED MIGRAINE TYPE: ICD-10-CM

## 2022-04-20 RX ORDER — LORATADINE 10 MG/1
10 TABLET ORAL DAILY
Qty: 90 TABLET | Refills: 1 | Status: SHIPPED | OUTPATIENT
Start: 2022-04-20 | End: 2022-04-26 | Stop reason: SDUPTHER

## 2022-04-20 RX ORDER — ONDANSETRON 4 MG/1
TABLET, FILM COATED ORAL
Qty: 30 TABLET | Refills: 3 | Status: SHIPPED | OUTPATIENT
Start: 2022-04-20 | End: 2022-04-26 | Stop reason: SDUPTHER

## 2022-04-20 RX ORDER — LORATADINE 10 MG/1
10 TABLET ORAL DAILY
Qty: 90 TABLET | Refills: 1 | Status: SHIPPED | OUTPATIENT
Start: 2022-04-20 | End: 2022-04-20 | Stop reason: SDUPTHER

## 2022-04-20 RX ORDER — SUMATRIPTAN 50 MG/1
50 TABLET, FILM COATED ORAL
Qty: 9 TABLET | Refills: 1 | Status: SHIPPED | OUTPATIENT
Start: 2022-04-20 | End: 2022-04-20 | Stop reason: SDUPTHER

## 2022-04-20 RX ORDER — ALBUTEROL SULFATE 90 UG/1
AEROSOL, METERED RESPIRATORY (INHALATION)
Qty: 1 EACH | Refills: 5 | Status: SHIPPED | OUTPATIENT
Start: 2022-04-20 | End: 2022-04-26 | Stop reason: SDUPTHER

## 2022-04-20 RX ORDER — MECLIZINE HCL 12.5 MG/1
TABLET ORAL
Qty: 30 TABLET | Refills: 0 | Status: SHIPPED | OUTPATIENT
Start: 2022-04-20 | End: 2022-04-26 | Stop reason: SDUPTHER

## 2022-04-20 RX ORDER — ONDANSETRON 4 MG/1
TABLET, FILM COATED ORAL
Qty: 30 TABLET | Refills: 3 | Status: SHIPPED | OUTPATIENT
Start: 2022-04-20 | End: 2022-04-20 | Stop reason: SDUPTHER

## 2022-04-20 RX ORDER — SUMATRIPTAN 50 MG/1
50 TABLET, FILM COATED ORAL
Qty: 9 TABLET | Refills: 1 | Status: SHIPPED | OUTPATIENT
Start: 2022-04-20 | End: 2022-04-26 | Stop reason: SDUPTHER

## 2022-04-20 RX ORDER — ALBUTEROL SULFATE 2.5 MG/3ML
SOLUTION RESPIRATORY (INHALATION)
COMMUNITY
Start: 2022-02-10 | End: 2022-04-26 | Stop reason: SDUPTHER

## 2022-04-20 RX ORDER — MECLIZINE HCL 12.5 MG/1
TABLET ORAL
Qty: 30 TABLET | Refills: 0 | Status: SHIPPED | OUTPATIENT
Start: 2022-04-20 | End: 2022-04-20 | Stop reason: SDUPTHER

## 2022-04-20 RX ORDER — ALBUTEROL SULFATE 90 UG/1
AEROSOL, METERED RESPIRATORY (INHALATION)
Qty: 1 EACH | Refills: 5 | Status: SHIPPED | OUTPATIENT
Start: 2022-04-20 | End: 2022-04-20 | Stop reason: SDUPTHER

## 2022-04-20 NOTE — TELEPHONE ENCOUNTER
Medication:   Requested Prescriptions     Pending Prescriptions Disp Refills    albuterol sulfate  (90 Base) MCG/ACT inhaler 1 each 5     Sig: INHALE 2 PUFFS EVERY 6 HOURS AS NEEDED FOR WHEEZING OR SHORTNESS OF BREATH    loratadine (CLARITIN) 10 MG tablet 90 tablet 1     Sig: Take 1 tablet by mouth daily    meclizine (ANTIVERT) 12.5 MG tablet 30 tablet 0     Sig: TAKE 1 TABLET  3 TIMES DAILY AS NEEDED FOR DIZZINESS    ondansetron (ZOFRAN) 4 MG tablet 30 tablet 3     Sig: TAKE 1 TABLET EVERY 8 HOURS AS NEEDED FOR NAUSEA AND VOMITING    SUMAtriptan (IMITREX) 50 MG tablet 9 tablet 1     Sig: Take 1 tablet by mouth once as needed for Migraine        Last Filled:      Patient Phone Number: [phone] (home)     Last appt: 9/24/2021   Next appt: 4/26/2022    Last OARRS:   RX Monitoring 12/10/2019   Attestation -   Periodic Controlled Substance Monitoring No signs of potential drug abuse or diversion identified.

## 2022-04-21 ENCOUNTER — TELEMEDICINE (OUTPATIENT)
Dept: PSYCHOLOGY | Age: 42
End: 2022-04-21
Payer: MEDICARE

## 2022-04-21 DIAGNOSIS — F43.10 PTSD (POST-TRAUMATIC STRESS DISORDER): Primary | ICD-10-CM

## 2022-04-21 PROCEDURE — 98968 PH1 ASSMT&MGMT NQHP 21-30: CPT | Performed by: PSYCHOLOGIST

## 2022-04-21 NOTE — PATIENT INSTRUCTIONS
1. Continue to keep slow, steady, driving in the \"slow lucille\" as family adjusts to living together  2. Continue to validate righteous anger towards mother for her ongoing \"issues\" that continue to impact you, but redirect in self-care manner  3. Continue to schedule down time for self: \"cuddling with dog\", playing video games for fun, etc for stress reduction  4. Continue to use \"planned avoidance\" skill to support managing any potential conflict with family members in the home  5. Continue to keep physical boundary with mother not coming upstairs due to \"safety\" [emotional safety for you]  6. Continue to schedule \"down\" days as needed, without guilt 1-2 x per week \" off duty\" day for stress reduction  7.  Return to clinic for Dr Roberto Capps in 3-4 weeks

## 2022-04-21 NOTE — PROGRESS NOTES
Behavioral Health Consultation Follow-up  Chalino Stahl PsyD  Psychologist  4/21/2022  8:06 AM    NOTE - this visit was able to not be conducted via audiovisual means : MyChart, Doxy, etc, in accordance with CMS guidelines due video trouble so virtual telephone visit completed. During BAIW-24 public health emergency. The patient (or guardian if applicable) is aware that this is a billable service, which includes applicable co-pays. This Virtual Visit was conducted with patient's (and/or legal guardian's) consent. Telemedicine APA guidelines were reviewed and discussed. The patient was located in a state where the provider was licensed to provide care. Location of provider: [facility]  Location of patient: home    Time spent with Patient: 30 minutes  This is patient's eighth  [facility] appointment. Reason for Consult:  PTSD  Referring Provider: Mary Banks MD  [address]  [address],  [address]    Feedback given to PCP. S:    Last appt: 3/24. Things \"are balancing\" with mother and grandmother. Been journaling as family, using mood chart and wellness wheels. Every week checking in with each other. Discussing problems as they are emerging and not passive aggressively communicating. [place] settings with herself and family members. [facility] coming in, nurse and SW 1 x per week which really helping with care of grandmother.      O:  MSE:      Appetite normal  Sleep disturbance better  Fatigue better  Loss of pleasure better  Impulsive behavior No  Speech    normal rate, normal volume and well articulated  Mood    Stable, managing, expressing confidence in managing anxiety   Thought Content    intact  Thought Process    linear, goal directed and coherent  Associations    logical connections  Insight    Good  Judgment    Intact  Orientation    oriented to person, place, time, and general circumstances  Memory    recent and remote memory intact  Attention/Concentration intact  Morbid ideation No  Suicide Assessment    no suicidal ideation      History:    Medications:   Current Outpatient Medications   Medication Sig Dispense Refill    albuterol (PROVENTIL) (2.5 MG/3ML) 0.083% nebulizer solution       albuterol sulfate  (90 Base) MCG/ACT inhaler INHALE 2 PUFFS EVERY 6 HOURS AS NEEDED FOR WHEEZING OR SHORTNESS OF BREATH 1 each 5    loratadine (CLARITIN) 10 MG tablet Take 1 tablet by mouth daily 90 tablet 1    meclizine (ANTIVERT) 12.5 MG tablet TAKE 1 TABLET  3 TIMES DAILY AS NEEDED FOR DIZZINESS 30 tablet 0    ondansetron (ZOFRAN) 4 MG tablet TAKE 1 TABLET EVERY 8 HOURS AS NEEDED FOR NAUSEA AND VOMITING 30 tablet 3    SUMAtriptan (IMITREX) 50 MG tablet Take 1 tablet by mouth once as needed for Migraine 9 tablet 1    FLUoxetine (PROZAC) 40 MG capsule Take 2 capsules by mouth daily 60 capsule 2    fluticasone-salmeterol (ADVAIR DISKUS) 500-50 MCG/DOSE diskus inhaler Inhale 1 puff into the lungs every 12 hours 180 each 3     No current facility-administered medications for this visit.        Social History:   Social History     Socioeconomic History    Marital status: Single     Spouse name: Not on file    Number of children: Not on file    Years of education: Not on file    Highest education level: Not on file   Occupational History    Not on file   Tobacco Use    Smoking status: Never Smoker    Smokeless tobacco: Never Used   Vaping Use    Vaping Use: Never used   Substance and Sexual Activity    Alcohol use: Yes     Comment: occasional    Drug use: No    Sexual activity: Yes     Partners: Male   Other Topics Concern    Not on file   Social History Narrative    Not on file     Social Determinants of Health     Financial Resource Strain: High Risk    Difficulty of Paying Living Expenses: Hard   Food Insecurity: Food Insecurity Present    Worried About Running Out of Food in the Last Year: Sometimes true    Thuy of Food in the Last Year: Sometimes true   Transportation Needs:     Lack of Transportation (Medical): Not on file    Lack of Transportation (Non-Medical): Not on file   Physical Activity:     Days of Exercise per Week: Not on file    Minutes of Exercise per Session: Not on file   Stress:     Feeling of Stress : Not on file   Social Connections:     Frequency of Communication with Friends and Family: Not on file    Frequency of Social Gatherings with Friends and Family: Not on file    Attends Roman Catholic Services: Not on file    Active Member of [address] [place] [address] [place] or Organizations: Not on file    Attends Club or Organization Meetings: Not on file    Marital Status: Not on file   Intimate Partner Violence:     Fear of Current or Ex-Partner: Not on file    Emotionally Abused: Not on file    Physically Abused: Not on file    Sexually Abused: Not on file   Housing Stability:     Unable to Pay for Housing in the Last Year: Not on file    Number of [place] in the Last Year: Not on file    Unstable Housing in the Last Year: Not on file       TOBACCO:   reports that she has never smoked. She has never used smokeless tobacco.  ETOH:   reports current alcohol use.     Family History:   Family History   Problem Relation Age of Onset    Other Mother         autoimmune dz    Other Father         cirrhosis    Substance Abuse Father     Cancer Father      A:    See S: above    PHQ Scores 4/19/2022 9/22/2021 6/10/2021 12/13/2019 10/22/2019 8/13/2019 7/22/2019   PHQ2 Score 6 6 6 6 6 6 6   PHQ9 Score 27 24 27 24 24 27 26     Interpretation of Total Score Depression Severity: 1-4 = Minimal depression, 5-9 = Mild depression, 10-14 = Moderate depression, 15-19 = Moderately severe depression, 20-27 = Severe depression    BONITA 7 SCORE 4/19/2022 9/22/2021 6/10/2021 12/13/2019 8/13/2019 7/22/2019 6/25/2019   BONITA-7 Total Score 21 - - - - - -   BONITA-7 Total Score - 21 21 21 21 21 20     Interpretation of BONITA-7 score: 5-9 = mild anxiety, 10-14 = moderate anxiety, 15+ = severe anxiety. Recommend referral to behavioral health for scores 10 or greater. Safety: denied any si/hi risk, intent, or plan    Diagnosis:    PTSD      Diagnosis Date    Asthma     Back pain     Bilateral ovarian cysts     Bipolar 1 disorder (HCC)     Cancer (HCC)     adenoca in situ cervix    DDD (degenerative disc disease), lumbar     Headache(784.0)     IBS (irritable bowel syndrome)     Multiple benign melanocytic nevi 4/10/2019    OCD (obsessive compulsive disorder)     PTSD (post-traumatic stress disorder)     Vertigo      Problems with primary support group and Problems related to the social environment    Plan:  Pt interventions:    Practiced assertive communication, Trained in strategies for increasing balanced thinking and Discussed self-care (sleep, nutrition, rewarding activities, social support, exercise)    Pt Behavioral Change Plan:    1. Continue to keep slow, steady, driving in the \"slow lucille\" as family adjusts to living together  2. Continue to validate righteous anger towards mother for her ongoing \"issues\" that continue to impact you, but redirect in self-care manner  3. Continue to schedule down time for self: \"cuddling with dog\", playing video games for fun, etc for stress reduction  4. Continue to use \"planned avoidance\" skill to support managing any potential conflict with family members in the home  5. Continue to keep physical boundary with mother not coming upstairs due to \"safety\" [emotional safety for you]  6. Continue to schedule \"down\" days as needed, without guilt 1-2 x per week \" off duty\" day for stress reduction  7.  Return to clinic for Dr Shy Chery in 3-4 weeks

## 2022-04-26 ENCOUNTER — OFFICE VISIT (OUTPATIENT)
Dept: PRIMARY CARE CLINIC | Age: 42
End: 2022-04-26
Payer: MEDICARE

## 2022-04-26 VITALS
DIASTOLIC BLOOD PRESSURE: 68 MMHG | TEMPERATURE: 97.6 F | BODY MASS INDEX: 35.91 KG/M2 | WEIGHT: 215.8 LBS | HEART RATE: 78 BPM | SYSTOLIC BLOOD PRESSURE: 105 MMHG

## 2022-04-26 DIAGNOSIS — G43.909 MIGRAINE WITHOUT STATUS MIGRAINOSUS, NOT INTRACTABLE, UNSPECIFIED MIGRAINE TYPE: ICD-10-CM

## 2022-04-26 DIAGNOSIS — F60.3 BORDERLINE PERSONALITY DISORDER (HCC): ICD-10-CM

## 2022-04-26 DIAGNOSIS — J45.40 MODERATE PERSISTENT ASTHMA WITHOUT COMPLICATION: ICD-10-CM

## 2022-04-26 DIAGNOSIS — R42 VERTIGO: ICD-10-CM

## 2022-04-26 DIAGNOSIS — F31.9 BIPOLAR AFFECTIVE DISORDER, REMISSION STATUS UNSPECIFIED (HCC): ICD-10-CM

## 2022-04-26 DIAGNOSIS — R11.0 CHRONIC NAUSEA: ICD-10-CM

## 2022-04-26 DIAGNOSIS — J30.9 ALLERGIC RHINITIS, UNSPECIFIED SEASONALITY, UNSPECIFIED TRIGGER: ICD-10-CM

## 2022-04-26 PROCEDURE — 99213 OFFICE O/P EST LOW 20 MIN: CPT | Performed by: FAMILY MEDICINE

## 2022-04-26 PROCEDURE — G8417 CALC BMI ABV UP PARAM F/U: HCPCS | Performed by: FAMILY MEDICINE

## 2022-04-26 PROCEDURE — G8427 DOCREV CUR MEDS BY ELIG CLIN: HCPCS | Performed by: FAMILY MEDICINE

## 2022-04-26 PROCEDURE — 1036F TOBACCO NON-USER: CPT | Performed by: FAMILY MEDICINE

## 2022-04-26 RX ORDER — LORATADINE 10 MG/1
10 TABLET ORAL DAILY
Qty: 90 TABLET | Refills: 1 | Status: SHIPPED | OUTPATIENT
Start: 2022-04-26 | End: 2022-08-10 | Stop reason: ALTCHOICE

## 2022-04-26 RX ORDER — MECLIZINE HCL 12.5 MG/1
TABLET ORAL
Qty: 30 TABLET | Refills: 0 | Status: SHIPPED | OUTPATIENT
Start: 2022-04-26

## 2022-04-26 RX ORDER — ALBUTEROL SULFATE 2.5 MG/3ML
2.5 SOLUTION RESPIRATORY (INHALATION) EVERY 4 HOURS PRN
Qty: 120 EACH | Refills: 3 | Status: SHIPPED | OUTPATIENT
Start: 2022-04-26

## 2022-04-26 RX ORDER — FLUTICASONE PROPIONATE AND SALMETEROL 500; 50 UG/1; UG/1
1 POWDER RESPIRATORY (INHALATION) EVERY 12 HOURS
Qty: 60 EACH | Refills: 3 | Status: SHIPPED | OUTPATIENT
Start: 2022-04-26

## 2022-04-26 RX ORDER — ONDANSETRON 4 MG/1
TABLET, FILM COATED ORAL
Qty: 30 TABLET | Refills: 3 | Status: SHIPPED | OUTPATIENT
Start: 2022-04-26

## 2022-04-26 RX ORDER — ALBUTEROL SULFATE 90 UG/1
AEROSOL, METERED RESPIRATORY (INHALATION)
Qty: 1 EACH | Refills: 5 | Status: SHIPPED | OUTPATIENT
Start: 2022-04-26

## 2022-04-26 RX ORDER — SUMATRIPTAN 50 MG/1
50 TABLET, FILM COATED ORAL
Qty: 9 TABLET | Refills: 1 | Status: SHIPPED | OUTPATIENT
Start: 2022-04-26 | End: 2022-04-26

## 2022-04-26 ASSESSMENT — ENCOUNTER SYMPTOMS
SHORTNESS OF BREATH: 0
EYE PAIN: 0
COUGH: 0
DIARRHEA: 0
CONSTIPATION: 0
ABDOMINAL PAIN: 0

## 2022-04-26 NOTE — PROGRESS NOTES
Chief Complaint   Patient presents with    Medication Check         HPI:  Vonnie Montes is a 37 y.o. (:1980) here today for discussion of mental and physical health and wt mgmt. All chronic meds need refills    Migraines - Imitrex, controlled and stable. Usually uses Excedrin first, Imitrex about once every 2 months. Bipolar affective DO and borderline DO - Prozac and seeing therapist. Stable and controlled. Vertigo and nausea - Zofran and meclizine. Infrequent use. Asthma - controlled on regimen and feels that her nebs help with exacerbations. Her main concern today is her health in the COVID climate in the setting of asthma. She would love to go back to the gym and get acupuncture as well as massages but she is worried about manny COVID. Has been vaccinated but has not received booster. She states that it makes her happy going to the gym and getting acupuncture and also makes her feel more healthy when her weight is at goal with exercise. Review of Systems   Constitutional: Positive for fatigue. Negative for appetite change, chills and fever. HENT: Negative for congestion. Eyes: Negative for pain and visual disturbance. Respiratory: Negative for cough and shortness of breath. Cardiovascular: Negative for chest pain and palpitations. Gastrointestinal: Negative for abdominal pain, constipation and diarrhea. Genitourinary: Negative for difficulty urinating. Musculoskeletal: Negative for arthralgias. Skin: Negative for rash and wound. Neurological: Negative for dizziness, weakness, light-headedness and headaches. Hematological: Does not bruise/bleed easily. Psychiatric/Behavioral: Negative for behavioral problems. The patient is nervous/anxious.         Past Medical History:   Diagnosis Date    Asthma     Back pain     Bilateral ovarian cysts     Bipolar 1 disorder (HCC)     Cancer (HCC)     adenoca in situ cervix    DDD (degenerative disc disease), lumbar     Headache(784.0)     IBS (irritable bowel syndrome)     Multiple benign melanocytic nevi 4/10/2019    OCD (obsessive compulsive disorder)     PTSD (post-traumatic stress disorder)     Vertigo      Family History   Problem Relation Age of Onset    Other Mother         autoimmune dz    Other Father         cirrhosis    Substance Abuse Father     Cancer Father      Social History     Tobacco Use    Smoking status: Never Smoker    Smokeless tobacco: Never Used   Vaping Use    Vaping Use: Never used   Substance Use Topics    Alcohol use: Yes     Comment: occasional    Drug use: No         New Prescriptions    FLUTICASONE-SALMETEROL (ADVAIR) 500-50 MCG/ACT AEPB DISKUS INHALER    Inhale 1 puff into the lungs every 12 hours         Meds Prior to visit:  Prior to Visit Medications    Medication Sig Taking?  Authorizing Provider   SUMAtriptan (IMITREX) 50 MG tablet Take 1 tablet by mouth once as needed for Migraine Yes Mark Sales MD   ondansetron (ZOFRAN) 4 MG tablet TAKE 1 TABLET EVERY 8 HOURS AS NEEDED FOR NAUSEA AND VOMITING Yes Mark Sales MD   meclizine (ANTIVERT) 12.5 MG tablet TAKE 1 TABLET  3 TIMES DAILY AS NEEDED FOR DIZZINESS Yes Mark Sales MD   loratadine (CLARITIN) 10 MG tablet Take 1 tablet by mouth daily Yes Mark Sales MD   albuterol sulfate  (90 Base) MCG/ACT inhaler INHALE 2 PUFFS EVERY 6 HOURS AS NEEDED FOR WHEEZING OR SHORTNESS OF BREATH Yes Mark Sales MD   albuterol (PROVENTIL) (2.5 MG/3ML) 0.083% nebulizer solution Take 3 mLs by nebulization every 4 hours as needed for Wheezing or Shortness of Breath Yes Mark Sales MD   fluticasone-salmeterol (ADVAIR) 500-50 MCG/ACT AEPB diskus inhaler Inhale 1 puff into the lungs every 12 hours Yes Mark Sales MD   FLUoxetine (PROZAC) 40 MG capsule Take 2 capsules by mouth daily Yes Lucho Cannon MD     Allergies   Allergen Reactions    Amitriptyline      Bipolar reaction OBJECTIVE:    /68   Pulse 78   Temp 97.6 °F (36.4 °C) (Temporal)   Wt 215 lb 12.8 oz (97.9 kg)   LMP 02/02/2016   BMI 35.91 kg/m²   BP Readings from Last 2 Encounters:   04/26/22 105/68   09/09/21 115/70     Wt Readings from Last 3 Encounters:   04/26/22 215 lb 12.8 oz (97.9 kg)   09/09/21 210 lb 12.2 oz (95.6 kg)   06/22/21 205 lb 6.4 oz (93.2 kg)       Physical Exam  Vitals reviewed. Constitutional:       General: She is not in acute distress. Appearance: Normal appearance. She is obese. She is not ill-appearing. HENT:      Head: Normocephalic and atraumatic. Right Ear: External ear normal.      Left Ear: External ear normal.      Nose: Nose normal. No congestion. Mouth/Throat:      Mouth: Mucous membranes are moist.      Pharynx: Oropharynx is clear. No oropharyngeal exudate. Eyes:      Extraocular Movements: Extraocular movements intact. Conjunctiva/sclera: Conjunctivae normal.      Pupils: Pupils are equal, round, and reactive to light. Cardiovascular:      Rate and Rhythm: Normal rate and regular rhythm. Pulses: Normal pulses. Heart sounds: Normal heart sounds. Pulmonary:      Effort: Pulmonary effort is normal. No respiratory distress. Breath sounds: Normal breath sounds. No stridor. No wheezing, rhonchi or rales. Abdominal:      General: Bowel sounds are normal.      Palpations: Abdomen is soft. Tenderness: There is no abdominal tenderness. Musculoskeletal:         General: Normal range of motion. Cervical back: Normal range of motion and neck supple. Right lower leg: No edema. Left lower leg: No edema. Skin:     General: Skin is warm. Capillary Refill: Capillary refill takes less than 2 seconds. Findings: No erythema or rash. Neurological:      General: No focal deficit present. Mental Status: She is alert and oriented to person, place, and time. Mental status is at baseline. Motor: No weakness. Coordination: Coordination normal.      Gait: Gait normal.   Psychiatric:         Mood and Affect: Mood normal.         Behavior: Behavior normal.         Thought Content: Thought content normal.         Judgment: Judgment normal.             Microscopic Examination (no units)   Date Value   07/28/2017 Not Indicated     LDL Calculated (mg/dL)   Date Value   09/16/2019 125 (H)       ASSESSMENT/PLAN:    1. Migraine without status migrainosus, not intractable, unspecified migraine type  Controlled per patient  Continue to monitor  Follow-up as needed  - SUMAtriptan (IMITREX) 50 MG tablet; Take 1 tablet by mouth once as needed for Migraine  Dispense: 9 tablet; Refill: 1    2. Bipolar affective disorder, remission status unspecified ([facility]  Controlled per patient  Continue to monitor  Follow-up as needed  SSRIs prescribed by her psychiatrist    3. Vertigo  Refills appropriate  No symptoms or signs of vertigo at this time  Infrequent  Follow-up as needed  - ondansetron (ZOFRAN) 4 MG tablet; TAKE 1 TABLET EVERY 8 HOURS AS NEEDED FOR NAUSEA AND VOMITING  Dispense: 30 tablet; Refill: 3  - meclizine (ANTIVERT) 12.5 MG tablet; TAKE 1 TABLET  3 TIMES DAILY AS NEEDED FOR DIZZINESS  Dispense: 30 tablet; Refill: 0    4. Chronic nausea  No symptoms at this time  Follow-up as needed  - ondansetron (ZOFRAN) 4 MG tablet; TAKE 1 TABLET EVERY 8 HOURS AS NEEDED FOR NAUSEA AND VOMITING  Dispense: 30 tablet; Refill: 3    5. Allergic rhinitis, unspecified seasonality, unspecified trigger  No symptoms at this time  Continue taking Claritin daily  Controlled, follow-up as needed  - loratadine (CLARITIN) 10 MG tablet; Take 1 tablet by mouth daily  Dispense: 90 tablet; Refill: 1    6. Borderline personality disorder ([facility]  Controlled per patient  Continue to monitor  Follow-up as needed  SSRIs prescribed by her psychiatrist    7.  Moderate persistent asthma without complication  Controlled per patient  Refills appropriate  Discussed COVID and risk with asthma  Also discussed going back to the gym and wearing a mask for comfort  - albuterol sulfate  (90 Base) MCG/ACT inhaler; INHALE 2 PUFFS EVERY 6 HOURS AS NEEDED FOR WHEEZING OR SHORTNESS OF BREATH  Dispense: 1 each; Refill: 5  - albuterol (PROVENTIL) (2.5 MG/3ML) 0.083% nebulizer solution; Take 3 mLs by nebulization every 4 hours as needed for Wheezing or Shortness of Breath  Dispense: 120 each; Refill: 3  - fluticasone-salmeterol (ADVAIR) 500-50 MCG/ACT AEPB diskus inhaler; Inhale 1 puff into the lungs every 12 hours  Dispense: 60 each; Refill: 3      RTC Return if symptoms worsen or fail to improve.     Future Appointments   Date Time Provider Mina Edwards   5/11/2022  2:00 PM Deann Dumont [facility]   5/24/2022  4:00 PM MD ANGEL Kong [facility]   6/1/2022  1:00 PM GENA Feng PSY [facility] oJhn MD  4/26/2022  5:14 PM

## 2022-05-11 ENCOUNTER — TELEMEDICINE (OUTPATIENT)
Dept: PSYCHOLOGY | Age: 42
End: 2022-05-11
Payer: MEDICARE

## 2022-05-11 DIAGNOSIS — F43.10 PTSD (POST-TRAUMATIC STRESS DISORDER): Primary | ICD-10-CM

## 2022-05-11 PROCEDURE — 98968 PH1 ASSMT&MGMT NQHP 21-30: CPT | Performed by: PSYCHOLOGIST

## 2022-05-11 NOTE — PATIENT INSTRUCTIONS
1. Continue to keep slow, steady, driving in the \"slow lucille\" as family adjusts to living together  2. Continue to validate righteous anger towards mother for her ongoing \"issues\" that continue to impact you, but redirect in self-care manner  3. Continue to schedule down time for self: \"cuddling with dog\", playing video games for fun, etc for stress reduction  4. Continue to use \"planned avoidance\" skill to support managing any potential conflict with family members in the home  5. Continue to keep physical boundary with mother not coming upstairs due to \"safety\" [emotional safety for you]  6. Continue to schedule \"down\" days as needed, without guilt 1-2 x per week \" off duty\" day for stress reduction  7.  Return to clinic for Dr Grisel Acosta in 3-4 weeks

## 2022-05-11 NOTE — PROGRESS NOTES
Behavioral Health Consultation Follow-up  Chacho Young PsyD  Psychologist  5/11/2022  2:07 PM    NOTE - this visit was not able to be conducted via audiovisual means : MyClydiat, Doxy, etc, in accordance with CMS guidelines due to video trouble so telephone visit completed. During AEPZI-78 public health emergency. The patient (or guardian if applicable) is aware that this is a billable service, which includes applicable co-pays. This Virtual Visit was conducted with patient's (and/or legal guardian's) consent. Telemedicine APA guidelines were reviewed and discussed. The patient was located in a state where the provider was licensed to provide care. Location of provider: [place]   Location of patient: home    Time spent with Patient: 25 minutes  This is patient's first  [facility] appointment. Reason for Consult:  PTSD  Referring Provider: Alec Murdock MD  [address]  [address],  [address]    Feedback given to PCP. S:    Last appt: 4/21. Cold symptoms so wanted telephone visit[facility]: Tuesday, May 17. Some understandable health anxiety. Max getting endo/colonoscopy on 5/24 is additional stressor. Focused on compassionate, non judgmental attitude towards herself. Grandmother out of hospice.      O:  MSE:    Appetite normal  Sleep disturbance Yes  Fatigue Yes  Loss of pleasure Yes  Impulsive behavior No  Speech    normal rate, normal volume and well articulated  Mood    Stressed but managing   Thought Content    intact  Thought Process    linear, goal directed and coherent  Associations    logical connections  Insight    Good  Judgment    Intact  Orientation    oriented to person, place, time, and general circumstances  Memory    recent and remote memory intact  Attention/Concentration    intact  Morbid ideation No  Suicide Assessment    no suicidal ideation    History:    Medications:   Current Outpatient Medications   Medication Sig Dispense Refill    SUMAtriptan (IMITREX) 50 MG tablet Take 1 tablet by mouth once as needed for Migraine 9 tablet 1    ondansetron (ZOFRAN) 4 MG tablet TAKE 1 TABLET EVERY 8 HOURS AS NEEDED FOR NAUSEA AND VOMITING 30 tablet 3    meclizine (ANTIVERT) 12.5 MG tablet TAKE 1 TABLET  3 TIMES DAILY AS NEEDED FOR DIZZINESS 30 tablet 0    loratadine (CLARITIN) 10 MG tablet Take 1 tablet by mouth daily 90 tablet 1    albuterol sulfate  (90 Base) MCG/ACT inhaler INHALE 2 PUFFS EVERY 6 HOURS AS NEEDED FOR WHEEZING OR SHORTNESS OF BREATH 1 each 5    albuterol (PROVENTIL) (2.5 MG/3ML) 0.083% nebulizer solution Take 3 mLs by nebulization every 4 hours as needed for Wheezing or Shortness of Breath 120 each 3    fluticasone-salmeterol (ADVAIR) 500-50 MCG/ACT AEPB diskus inhaler Inhale 1 puff into the lungs every 12 hours 60 each 3    FLUoxetine (PROZAC) 40 MG capsule Take 2 capsules by mouth daily 60 capsule 2     No current facility-administered medications for this visit.        Social History:   Social History     Socioeconomic History    Marital status: Single     Spouse name: Not on file    Number of children: Not on file    Years of education: Not on file    Highest education level: Not on file   Occupational History    Not on file   Tobacco Use    Smoking status: Never Smoker    Smokeless tobacco: Never Used   Vaping Use    Vaping Use: Never used   Substance and Sexual Activity    Alcohol use: Yes     Comment: occasional    Drug use: No    Sexual activity: Yes     Partners: Male   Other Topics Concern    Not on file   Social History Narrative    Not on file     Social Determinants of Health     Financial Resource Strain: High Risk    Difficulty of Paying Living Expenses: Hard   Food Insecurity: Food Insecurity Present    Worried About Running Out of Food in the Last Year: Sometimes true    Thuy of Food in the Last Year: Sometimes true   Transportation Needs:     Lack of Transportation (Medical): Not on file    Lack of Transportation (Non-Medical): Not on file   Physical Activity:     Days of Exercise per Week: Not on file    Minutes of Exercise per Session: Not on file   Stress:     Feeling of Stress : Not on file   Social Connections:     Frequency of Communication with Friends and Family: Not on file    Frequency of Social Gatherings with Friends and Family: Not on file    Attends Buddhism Services: Not on file    Active Member of [address] [place] [address] [employer] or Organizations: Not on file    Attends Club or Organization Meetings: Not on file    Marital Status: Not on file   Intimate Partner Violence:     Fear of Current or Ex-Partner: Not on file    Emotionally Abused: Not on file    Physically Abused: Not on file    Sexually Abused: Not on file   Housing Stability:     Unable to Pay for Housing in the Last Year: Not on file    Number of [place] in the Last Year: Not on file    Unstable Housing in the Last Year: Not on file       TOBACCO:   reports that she has never smoked. She has never used smokeless tobacco.  ETOH:   reports current alcohol use. Family History:   Family History   Problem Relation Age of Onset    Other Mother         autoimmune dz    Other Father         cirrhosis    Substance Abuse Father     Cancer Father      A:    See S: above    PHQ Scores 4/19/2022 9/22/2021 6/10/2021 12/13/2019 10/22/2019 8/13/2019 7/22/2019   PHQ2 Score 6 6 6 6 6 6 6   PHQ9 Score 27 24 27 24 24 27 26     Interpretation of Total Score Depression Severity: 1-4 = Minimal depression, 5-9 = Mild depression, 10-14 = Moderate depression, 15-19 = Moderately severe depression, 20-27 = Severe depression    BONITA 7 SCORE 4/19/2022 9/22/2021 6/10/2021 12/13/2019 8/13/2019 7/22/2019 6/25/2019   BONITA-7 Total Score 21 - - - - - -   BONITA-7 Total Score - 21 21 21 21 21 20     Interpretation of BONITA-7 score: 5-9 = mild anxiety, 10-14 = moderate anxiety, 15+ = severe anxiety.  Recommend referral to behavioral health for scores 10 or greater. Safety: Denied any si/hi risk, intent, or plan     Diagnosis:    PTSD      Diagnosis Date    Asthma     Back pain     Bilateral ovarian cysts     Bipolar 1 disorder (HCC)     Cancer (HCC)     adenoca in situ cervix    DDD (degenerative disc disease), lumbar     Headache(784.0)     IBS (irritable bowel syndrome)     Multiple benign melanocytic nevi 4/10/2019    OCD (obsessive compulsive disorder)     PTSD (post-traumatic stress disorder)     Vertigo      Problems with primary support group, Problems related to the social environment and Other psychosocial and environmental problems    Plan:  Pt interventions:    Practiced assertive communication, Trained in strategies for increasing balanced thinking, Discussed self-care (sleep, nutrition, rewarding activities, social support, exercise) and Supportive techniques    Pt Behavioral Change Plan:    1. Continue to keep slow, steady, driving in the \"slow lucille\" as family adjusts to living together  2. Continue to validate righteous anger towards mother for her ongoing \"issues\" that continue to impact you, but redirect in self-care manner  3. Continue to schedule down time for self: \"cuddling with dog\", playing video games for fun, etc for stress reduction  4. Continue to use \"planned avoidance\" skill to support managing any potential conflict with family members in the home  5. Continue to keep physical boundary with mother not coming upstairs due to \"safety\" [emotional safety for you]  6. Continue to schedule \"down\" days as needed, without guilt 1-2 x per week \" off duty\" day for stress reduction  7.  Return to clinic for Dr Rod Martin in 3-4 weeks

## 2022-05-17 VITALS
BODY MASS INDEX: 35.45 KG/M2 | WEIGHT: 213 LBS | SYSTOLIC BLOOD PRESSURE: 107 MMHG | RESPIRATION RATE: 12 BRPM | HEART RATE: 81 BPM | DIASTOLIC BLOOD PRESSURE: 78 MMHG

## 2022-05-17 ASSESSMENT — ENCOUNTER SYMPTOMS
EYES NEGATIVE: 1
RESPIRATORY NEGATIVE: 1
NAUSEA: 1
ALLERGIC/IMMUNOLOGIC NEGATIVE: 1

## 2022-06-01 ENCOUNTER — OFFICE VISIT (OUTPATIENT)
Dept: PSYCHOLOGY | Age: 42
End: 2022-06-01
Payer: MEDICARE

## 2022-06-01 ENCOUNTER — TELEPHONE (OUTPATIENT)
Dept: PSYCHOLOGY | Age: 42
End: 2022-06-01

## 2022-06-01 DIAGNOSIS — F43.10 PTSD (POST-TRAUMATIC STRESS DISORDER): Primary | ICD-10-CM

## 2022-06-01 PROCEDURE — 90834 PSYTX W PT 45 MINUTES: CPT | Performed by: PSYCHOLOGIST

## 2022-06-01 PROCEDURE — 1036F TOBACCO NON-USER: CPT | Performed by: PSYCHOLOGIST

## 2022-06-01 NOTE — TELEPHONE ENCOUNTER
Per our conversation, going to try to move another patient to earlier appt on 6/29 to offer this patient at 1 pm VV appointment. Thank you.  CW

## 2022-06-01 NOTE — PATIENT INSTRUCTIONS
1. Consider assertively communicating to new provider your feelings and needs to him to build trust in medical relationship  2. Continue to keep slow, steady, driving in the \"slow [place]ille\" as family adjusts to living together  3. Continue to validate righteous anger towards mother for her ongoing \"issues\" that continue to impact you, but redirect in self-care manner  3. Continue to schedule down time for self: \"cuddling with dog\", playing video games for fun, etc for stress reduction  4. Continue to use \"planned avoidance\" skill to support managing any potential conflict with family members in the home  5. Continue to keep physical boundary with mother not coming upstairs due to \"safety\" [emotional safety for you]  6. Continue to schedule \"down\" days as needed, without guilt 1-2 x per week \" off duty\" day for stress reduction  7.  Return to clinic for Dr Dejah Torres in 3-4 weeks

## 2022-06-01 NOTE — PROGRESS NOTES
Behavioral Health Consultation Follow-up  Lena Patton PsyD  Psychologist  6/1/2022  1:07 PM      Time spent with Patient: 40 minutes  This is patient's tenth  [facility] appointment. Reason for Consult:  PTSD  Referring Provider: Yovani Jorge MD  [address]  [address],  [address]    Feedback given to PCP. S:    Last appt: 5/11. Psychiatrist worry: met with Dr Sherrell Lynn for initial new pt appt, left feeling \"bad\" about samuel. Felt like he said \"I was a lazy and not pushing through enough\". This sparked deeper discussion about how this is a trauma trigger in that this is the experience she has had with many family members. Explored how she could assertively communicate to the provider in an effort to build trust and rapport with him, he is new psychiatry provider to her so lots of anxiety about speaking up for herself right now.      O:  MSE:    Appearance    Teary eyed at times, alert, cooperative  Appetite abnormal: poor  Sleep disturbance Yes  Fatigue Yes  Loss of pleasure Yes  Impulsive behavior No  Speech    normal rate, normal volume and well articulated  Mood    Anxious  Depressed  Low self-esteem  Affect    normal affect  Thought Content    intact  Thought Process    linear, goal directed and coherent  Associations    logical connections  Insight    Good  Judgment    Intact  Orientation    oriented to person, place, time, and general circumstances  Memory    recent and remote memory intact  Attention/Concentration    intact  Morbid ideation No  Suicide Assessment    no suicidal ideation    History:    Medications:   Current Outpatient Medications   Medication Sig Dispense Refill    SUMAtriptan (IMITREX) 50 MG tablet Take 1 tablet by mouth once as needed for Migraine 9 tablet 1    ondansetron (ZOFRAN) 4 MG tablet TAKE 1 TABLET EVERY 8 HOURS AS NEEDED FOR NAUSEA AND VOMITING 30 tablet 3    meclizine (ANTIVERT) 12.5 MG tablet TAKE 1 TABLET  3 TIMES DAILY AS NEEDED FOR DIZZINESS 30 tablet 0    loratadine (CLARITIN) 10 MG tablet Take 1 tablet by mouth daily 90 tablet 1    albuterol sulfate  (90 Base) MCG/ACT inhaler INHALE 2 PUFFS EVERY 6 HOURS AS NEEDED FOR WHEEZING OR SHORTNESS OF BREATH 1 each 5    albuterol (PROVENTIL) (2.5 MG/3ML) 0.083% nebulizer solution Take 3 mLs by nebulization every 4 hours as needed for Wheezing or Shortness of Breath 120 each 3    fluticasone-salmeterol (ADVAIR) 500-50 MCG/ACT AEPB diskus inhaler Inhale 1 puff into the lungs every 12 hours 60 each 3    FLUoxetine (PROZAC) 40 MG capsule Take 2 capsules by mouth daily 60 capsule 2     No current facility-administered medications for this visit. Social History:   Social History     Socioeconomic History    Marital status: Life Partner     Spouse name: Not on file    Number of children: 0    Years of education: 12    Highest education level: Bachelor's degree (e.g., BA, AB, BS)   Occupational History    Not on file   Tobacco Use    Smoking status: Never Smoker    Smokeless tobacco: Never Used   Vaping Use    Vaping Use: Never used   Substance and Sexual Activity    Alcohol use: Yes     Comment: occasional.  Drank heavily when she was younger   Everlene Kaitlin Drug use: Yes     Types: Marijuana Myrtis Regulus)     Comment: Edibles and THC pen    Sexual activity: Yes     Partners: Male   Other Topics Concern    Not on file   Social History Narrative    Patient does hold a degree in psychology. She has been with the current person that she is with for approximately 20 years, no unstable housing at this time but does have a history of homelessness. Patient has no children and is not currently working. No guns at home, no  service for the patient. Patient has been in a legal luciano with her aunt over monies her father was supposed to left her, with this luciano having lasted approximately 4 years.      Social Determinants of Health     Financial Resource Strain: High Risk    Difficulty of Paying Living Expenses: Hard   Food Insecurity: Food Insecurity Present    Worried About Running Out of Food in the Last Year: Sometimes true    Thuy of Food in the Last Year: Sometimes true   Transportation Needs:     Lack of Transportation (Medical): Not on file    Lack of Transportation (Non-Medical): Not on file   Physical Activity:     Days of Exercise per Week: Not on file    Minutes of Exercise per Session: Not on file   Stress:     Feeling of Stress : Not on file   Social Connections:     Frequency of Communication with Friends and Family: Not on file    Frequency of Social Gatherings with Friends and Family: Not on file    Attends Baptism Services: Not on file    Active Member of [address] [place] [address] or Organizations: Not on file    Attends Club or Organization Meetings: Not on file    Marital Status: Not on file   Intimate Partner Violence:     Fear of Current or Ex-Partner: Not on file    Emotionally Abused: Not on file    Physically Abused: Not on file    Sexually Abused: Not on file   Housing Stability:     Unable to Pay for Housing in the Last Year: Not on file    Number of [place] in the Last Year: Not on file    Unstable Housing in the Last Year: Not on file       TOBACCO:   reports that she has never smoked. She has never used smokeless tobacco.  ETOH:   reports current alcohol use.     Family History:   Family History   Problem Relation Age of Onset    Other Mother         autoimmune dz    Other Father         cirrhosis    Substance Abuse Father     Cancer Father     Breast Cancer Maternal Grandmother         On hospice in patient's home    Suicide Other         Uncle killed himself when patient age 4   [address] killed himself in 2017         A:    See S: above    PHQ Scores 4/19/2022 9/22/2021 6/10/2021 12/13/2019 10/22/2019 8/13/2019 7/22/2019   PHQ2 Score 6 6 6 6 6 6 6   PHQ9 Score 27 24 27 24 24 27 26     Interpretation of Total Score Depression Severity: 1-4 = Minimal depression, 5-9 = Mild depression, 10-14 = Moderate depression, 15-19 = Moderately severe depression, 20-27 = Severe depression    BONITA 7 SCORE 4/19/2022 9/22/2021 6/10/2021 12/13/2019 8/13/2019 7/22/2019 6/25/2019   BONITA-7 Total Score 21 - - - - - -   BONITA-7 Total Score - 21 21 21 21 21 20     Interpretation of BONITA-7 score: 5-9 = mild anxiety, 10-14 = moderate anxiety, 15+ = severe anxiety. Recommend referral to behavioral health for scores 10 or greater. Safety: denied any si/hi risk, intent, or plan     Diagnosis:    PTSD      Diagnosis Date    Asthma     Back pain     Bilateral ovarian cysts     Bipolar 1 disorder (HCC)     Cancer (HCC)     adenoca in situ cervix    DDD (degenerative disc disease), lumbar     Headache(784.0)     IBS (irritable bowel syndrome)     Multiple benign melanocytic nevi 4/10/2019    OCD (obsessive compulsive disorder)     PTSD (post-traumatic stress disorder)     Vertigo      Problems with primary support group, Problems related to the social environment, Occupational problems and Other psychosocial and environmental problems      Plan:  Pt interventions:    Practiced assertive communication    Pt Behavioral Change Plan:    1. Consider assertively communicating to new provider your feelings and needs to him to build trust in medical relationship  2. Continue to keep slow, steady, driving in the \"slow lucille\" as family adjusts to living together  3. Continue to validate righteous anger towards mother for her ongoing \"issues\" that continue to impact you, but redirect in self-care manner  3. Continue to schedule down time for self: \"cuddling with dog\", playing video games for fun, etc for stress reduction  4. Continue to use \"planned avoidance\" skill to support managing any potential conflict with family members in the home  5. Continue to keep physical boundary with mother not coming upstairs due to \"safety\" [emotional safety for you]  6.  Continue to schedule \"down\" days as needed, without guilt 1-2 x per week \" off duty\" day for stress reduction  7.  Return to clinic for Dr Makenna Marti in 3-4 weeks

## 2022-06-29 ENCOUNTER — OFFICE VISIT (OUTPATIENT)
Dept: PSYCHOLOGY | Age: 42
End: 2022-06-29
Payer: MEDICARE

## 2022-06-29 DIAGNOSIS — F43.10 PTSD (POST-TRAUMATIC STRESS DISORDER): Primary | ICD-10-CM

## 2022-06-29 PROCEDURE — 98968 PH1 ASSMT&MGMT NQHP 21-30: CPT | Performed by: PSYCHOLOGIST

## 2022-06-29 NOTE — PROGRESS NOTES
Behavioral Health Consultation Follow-up  Pham Muñoz PsyD  Psychologist  2022  1:09 PM    NOTE - this visit was not able to be conducted via audiovisual means : MyChart, Doxy, etc, in accordance with CMS guidelines due to video trouble so telephone visit completed. During DSWKL-10 public health emergency. The patient (or guardian if applicable) is aware that this is a billable service, which includes applicable co-pays. This Virtual Visit was conducted with patient's (and/or legal guardian's) consent. Telemedicine APA guidelines were reviewed and discussed. The patient was located in a state where the provider was licensed to provide care. Location of provider: [facility]  Location of patient: home    Time spent with Patient: 22 minutes  This is patient's    [facility] appointment. Reason for Consult:  PTSD  Referring Provider: July Iniguez MD   [address],  [address]    Feedback given to PCP. S:    Last appt: . Stressors: ongoing case with  father, SSDI reviewing of her case. Agreed most likely 5 year jennifer but that the court case with father is what was also going on when she was applying for SSDI 5 years ago. She will bring paperwork next week for us tor review. OV next week.      O:  MSE:    Appetite abnormal: poor  Sleep disturbance Yes  Fatigue Yes  Loss of pleasure Yes  Impulsive behavior No  Speech    normal rate, normal volume and well articulated  Mood    stressed  Thought Content    intact  Thought Process    linear, goal directed and coherent  Associations    logical connections  Insight    Good  Judgment    Intact  Orientation    oriented to person, place, time, and general circumstances  Memory    recent and remote memory intact  Attention/Concentration    intact  Morbid ideation No  Suicide Assessment    no suicidal ideation      History:    Medications:   Current Outpatient Medications   Medication Sig Dispense Refill    SUMAtriptan (IMITREX) 50 MG tablet Take 1 tablet by mouth once as needed for Migraine 9 tablet 1    ondansetron (ZOFRAN) 4 MG tablet TAKE 1 TABLET EVERY 8 HOURS AS NEEDED FOR NAUSEA AND VOMITING 30 tablet 3    meclizine (ANTIVERT) 12.5 MG tablet TAKE 1 TABLET  3 TIMES DAILY AS NEEDED FOR DIZZINESS 30 tablet 0    loratadine (CLARITIN) 10 MG tablet Take 1 tablet by mouth daily 90 tablet 1    albuterol sulfate  (90 Base) MCG/ACT inhaler INHALE 2 PUFFS EVERY 6 HOURS AS NEEDED FOR WHEEZING OR SHORTNESS OF BREATH 1 each 5    albuterol (PROVENTIL) (2.5 MG/3ML) 0.083% nebulizer solution Take 3 mLs by nebulization every 4 hours as needed for Wheezing or Shortness of Breath 120 each 3    fluticasone-salmeterol (ADVAIR) 500-50 MCG/ACT AEPB diskus inhaler Inhale 1 puff into the lungs every 12 hours 60 each 3    FLUoxetine (PROZAC) 40 MG capsule Take 2 capsules by mouth daily 60 capsule 2     No current facility-administered medications for this visit. Social History:   Social History     Socioeconomic History    Marital status: Life Partner     Spouse name: Not on file    Number of children: 0    Years of education: 12    Highest education level: Bachelor's degree (e.g., BA, AB, BS)   Occupational History    Not on file   Tobacco Use    Smoking status: Never Smoker    Smokeless tobacco: Never Used   Vaping Use    Vaping Use: Never used   Substance and Sexual Activity    Alcohol use: Yes     Comment: occasional.  Drank heavily when she was younger   Francisco Drug use: Yes     Types: Marijuana Garon Kettle)     Comment: Edibles and THC pen    Sexual activity: Yes     Partners: Male   Other Topics Concern    Not on file   Social History Narrative    Patient does hold a degree in psychology. She has been with the current person that she is with for approximately 20 years, no unstable housing at this time but does have a history of homelessness. Patient has no children and is not currently working. No guns at home, no [employer] service for the patient. Patient has been in a legal luciano with her aunt over monies her father was supposed to left her, with this luciano having lasted approximately 4 years. Social Determinants of Health     Financial Resource Strain:     Difficulty of Paying Living Expenses: Not on file   Food Insecurity:     Worried About Running Out of Food in the Last Year: Not on file    Thuy of Food in the Last Year: Not on file   Transportation Needs:     Lack of Transportation (Medical): Not on file    Lack of Transportation (Non-Medical): Not on file   Physical Activity:     Days of Exercise per Week: Not on file    Minutes of Exercise per Session: Not on file   Stress:     Feeling of Stress : Not on file   Social Connections:     Frequency of Communication with Friends and Family: Not on file    Frequency of Social Gatherings with Friends and Family: Not on file    Attends Yarsanism Services: Not on file    Active Member of [address] [place] [address] or Organizations: Not on file    Attends Club or Organization Meetings: Not on file    Marital Status: Not on file   Intimate Partner Violence:     Fear of Current or Ex-Partner: Not on file    Emotionally Abused: Not on file    Physically Abused: Not on file    Sexually Abused: Not on file   Housing Stability:     Unable to Pay for Housing in the Last Year: Not on file    Number of [place] in the Last Year: Not on file    Unstable Housing in the Last Year: Not on file       TOBACCO:   reports that she has never smoked. She has never used smokeless tobacco.  ETOH:   reports current alcohol use.     Family History:   Family History   Problem Relation Age of Onset    Other Mother         autoimmune dz    Other Father         cirrhosis    Substance Abuse Father     Cancer Father     Breast Cancer Maternal Grandmother         On hospice in patient's home    Suicide Other         Uncle killed himself when patient age 4   [address] killed himself in 2017         A:  See S: above    PHQ Scores 4/19/2022 9/22/2021 6/10/2021 12/13/2019 10/22/2019 8/13/2019 7/22/2019   PHQ2 Score 6 6 6 6 6 6 6   PHQ9 Score 27 24 27 24 24 27 26     Interpretation of Total Score Depression Severity: 1-4 = Minimal depression, 5-9 = Mild depression, 10-14 = Moderate depression, 15-19 = Moderately severe depression, 20-27 = Severe depression    BONITA 7 SCORE 4/19/2022 9/22/2021 6/10/2021 12/13/2019 8/13/2019 7/22/2019 6/25/2019   BONITA-7 Total Score 21 - - - - - -   BONITA-7 Total Score - 21 21 21 21 21 20     Interpretation of BONITA-7 score: 5-9 = mild anxiety, 10-14 = moderate anxiety, 15+ = severe anxiety. Recommend referral to behavioral health for scores 10 or greater. Safety: denied any si/hi risk    Diagnosis:    PTSD      Diagnosis Date    Asthma     Back pain     Bilateral ovarian cysts     Bipolar 1 disorder (HCC)     Cancer (HCC)     adenoca in situ cervix    DDD (degenerative disc disease), lumbar     Headache(784.0)     IBS (irritable bowel syndrome)     Multiple benign melanocytic nevi 4/10/2019    OCD (obsessive compulsive disorder)     PTSD (post-traumatic stress disorder)     Vertigo      Problems with primary support group, Problems related to the social environment, Problems related to interaction with the legal system/ crime and Other psychosocial and environmental problems    Plan:  Pt interventions:    Practiced assertive communication, Trained in strategies for increasing balanced thinking and Discussed self-care (sleep, nutrition, rewarding activities, social support, exercise)    Pt Behavioral Change Plan:    1. Consider assertively communicating to new provider your feelings and needs to him to build trust in medical relationship  2. Continue to keep slow, steady, driving in the \"slow lucille\" as family adjusts to living together  3.  Continue to validate righteous anger towards mother for her ongoing \"issues\" that continue to impact you, but redirect in self-care manner  3. Continue to schedule down time for self: \"cuddling with dog\", playing video games for fun, etc for stress reduction  4. Continue to use \"planned avoidance\" skill to support managing any potential conflict with family members in the home  5. Continue to keep physical boundary with mother not coming upstairs due to \"safety\" [emotional safety for you]  6. Continue to schedule \"down\" days as needed, without guilt 1-2 x per week \" off duty\" day for stress reduction  7. Bring SSDI case review paperwork to appointment next week  8.  Return to clinic for Dr Ashanti Brand in 1 week

## 2022-06-29 NOTE — PATIENT INSTRUCTIONS
1. Consider assertively communicating to new provider your feelings and needs to him to build trust in medical relationship  2. Continue to keep slow, steady, driving in the \"slow [place]ille\" as family adjusts to living together  3. Continue to validate righteous anger towards mother for her ongoing \"issues\" that continue to impact you, but redirect in self-care manner  3. Continue to schedule down time for self: \"cuddling with dog\", playing video games for fun, etc for stress reduction  4. Continue to use \"planned avoidance\" skill to support managing any potential conflict with family members in the home  5. Continue to keep physical boundary with mother not coming upstairs due to \"safety\" [emotional safety for you]  6. Continue to schedule \"down\" days as needed, without guilt 1-2 x per week \" off duty\" day for stress reduction  7. Bring SSDI case review paperwork to appointment next week  8.  Return to clinic for Dr Mary Veras in 1 week

## 2022-07-06 ENCOUNTER — OFFICE VISIT (OUTPATIENT)
Dept: PSYCHOLOGY | Age: 42
End: 2022-07-06
Payer: MEDICARE

## 2022-07-06 DIAGNOSIS — F43.10 PTSD (POST-TRAUMATIC STRESS DISORDER): Primary | ICD-10-CM

## 2022-07-06 PROCEDURE — 1036F TOBACCO NON-USER: CPT | Performed by: PSYCHOLOGIST

## 2022-07-06 PROCEDURE — 90832 PSYTX W PT 30 MINUTES: CPT | Performed by: PSYCHOLOGIST

## 2022-07-06 NOTE — PATIENT INSTRUCTIONS
1. Call SSDI Mrs Mayte May to let her know you have the packet and are in the midst of gathering 12 months updated records  2. Ask  for Medical records office ([id number]) contact to request records for the last 12 months  3. Begin working on [employer] review  4. Continue to scheduled regular downtime for self at home daily  5.  Return to clinic for Dr Jessika Jones in 1 month

## 2022-07-06 NOTE — PROGRESS NOTES
Behavioral Health Consultation Follow-up  Lisa Mendoza PsyD  Psychologist  7/6/2022  2:35 PM      Time spent with Patient: 30 minutes  This is patient's 12 th   [facility] appointment. Reason for Consult:  PTSD  Referring Provider: Gabriela Robb MD  [address]  [address],  [address]    Feedback given to PCP. S:    Last appt: 6/29. Here today feeling overwhelmed with the news that her SSDI case is being reviewed. With stressors of care of grandmother and mother in the home, receiving this letter was too much and so her PTSD related anxiety has worsened. Focused on PTSD emotion regulation tools and organized the materials needed to put together for her case review including: contacting [place] [address] local office via Mrs Claudean Harp immediately explaining she is in the midst of having requested medical records for the last 12 months. PTSD treatment: we continue with supportive/monitoring/maintenance of chronic and severe PTSD. Our goal continues to maintain quality of life of living with PTSD daily without work. Hoping at some point to move towards returning to a more intensive level of outpatient psychotherapy such as DBT but many barriers to treatment including finances, transportation, and simply her anxiety being so severe at times it has been difficult to leave her home. Which has required at times to complete virtual visits when she is unable to come to the office.      O:  MSE:    Appearance    alert, cooperative, crying  Appetite abnormal: poor, weight gain   Sleep disturbance Yes  Fatigue Yes  Loss of pleasure Yes  Impulsive behavior No  Speech    normal rate, normal volume and well articulated  Mood    Anxious  Guilty  Depressed  Low self-esteem  Shame  Affect    Stressed   Thought Content    intact  Thought Process    linear, goal directed and coherent  Associations    logical connections  Insight    Good  Judgment    Intact  Orientation    oriented to person, place, time, and general circumstances  Memory    recent and remote memory intact  Attention/Concentration    intact  Morbid ideation No  Suicide Assessment    no suicidal ideation    History:    Medications:   Current Outpatient Medications   Medication Sig Dispense Refill    SUMAtriptan (IMITREX) 50 MG tablet Take 1 tablet by mouth once as needed for Migraine 9 tablet 1    ondansetron (ZOFRAN) 4 MG tablet TAKE 1 TABLET EVERY 8 HOURS AS NEEDED FOR NAUSEA AND VOMITING 30 tablet 3    meclizine (ANTIVERT) 12.5 MG tablet TAKE 1 TABLET  3 TIMES DAILY AS NEEDED FOR DIZZINESS 30 tablet 0    loratadine (CLARITIN) 10 MG tablet Take 1 tablet by mouth daily 90 tablet 1    albuterol sulfate  (90 Base) MCG/ACT inhaler INHALE 2 PUFFS EVERY 6 HOURS AS NEEDED FOR WHEEZING OR SHORTNESS OF BREATH 1 each 5    albuterol (PROVENTIL) (2.5 MG/3ML) 0.083% nebulizer solution Take 3 mLs by nebulization every 4 hours as needed for Wheezing or Shortness of Breath 120 each 3    fluticasone-salmeterol (ADVAIR) 500-50 MCG/ACT AEPB diskus inhaler Inhale 1 puff into the lungs every 12 hours 60 each 3    FLUoxetine (PROZAC) 40 MG capsule Take 2 capsules by mouth daily 60 capsule 2     No current facility-administered medications for this visit. Social History:   Social History     Socioeconomic History    Marital status: Life Partner     Spouse name: Not on file    Number of children: 0    Years of education: 12    Highest education level:  Bachelor's degree (e.g., BA, AB, BS)   Occupational History    Not on file   Tobacco Use    Smoking status: Never Smoker    Smokeless tobacco: Never Used   Vaping Use    Vaping Use: Never used   Substance and Sexual Activity    Alcohol use: Yes     Comment: occasional.  Drank heavily when she was younger   Eitan Stager Drug use: Yes     Types: Marijuana Champ Cue)     Comment: Edibles and THC pen    Sexual activity: Yes     Partners: Male   Other Topics Concern    Not on file   Social History Narrative    Patient does hold a degree in psychology. She has been with the current person that she is with for approximately 20 years, no unstable housing at this time but does have a history of homelessness. Patient has no children and is not currently working. No guns at home, no  service for the patient. Patient has been in a legal luciano with her aunt over monies her father was supposed to left her, with this luciano having lasted approximately 4 years. Social Determinants of Health     Financial Resource Strain:     Difficulty of Paying Living Expenses: Not on file   Food Insecurity:     Worried About Running Out of Food in the Last Year: Not on file    Thuy of Food in the Last Year: Not on file   Transportation Needs:     Lack of Transportation (Medical): Not on file    Lack of Transportation (Non-Medical): Not on file   Physical Activity:     Days of Exercise per Week: Not on file    Minutes of Exercise per Session: Not on file   Stress:     Feeling of Stress : Not on file   Social Connections:     Frequency of Communication with Friends and Family: Not on file    Frequency of Social Gatherings with Friends and Family: Not on file    Attends Uats[employer] Services: Not on file    Active Member of [address] [place] [address] [employer] or Organizations: Not on file    Attends Club or Organization Meetings: Not on file    Marital Status: Not on file   Intimate Partner Violence:     Fear of Current or Ex-Partner: Not on file    Emotionally Abused: Not on file    Physically Abused: Not on file    Sexually Abused: Not on file   Housing Stability:     Unable to Pay for Housing in the Last Year: Not on file    Number of [place] in the Last Year: Not on file    Unstable Housing in the Last Year: Not on file       TOBACCO:   reports that she has never smoked. She has never used smokeless tobacco.  ETOH:   reports current alcohol use.     Family History:   Family History   Problem Relation Age of Onset    Other Mother         autoimmune dz    Other Father         cirrhosis    Substance Abuse Father     Cancer Father     Breast Cancer Maternal Grandmother         On hospice in patient's home    Suicide Other         Uncle killed himself when patient age 3    Suicide Cousin         Cousin killed himself in 2017         A:    See S: above    PHQ Scores 4/19/2022 9/22/2021 6/10/2021 12/13/2019 10/22/2019 8/13/2019 7/22/2019   PHQ2 Score 6 6 6 6 6 6 6   PHQ9 Score 27 24 27 24 24 27 26     Interpretation of Total Score Depression Severity: 1-4 = Minimal depression, 5-9 = Mild depression, 10-14 = Moderate depression, 15-19 = Moderately severe depression, 20-27 = Severe depression    BONITA 7 SCORE 4/19/2022 9/22/2021 6/10/2021 12/13/2019 8/13/2019 7/22/2019 6/25/2019   BONITA-7 Total Score 21 - - - - - -   BONITA-7 Total Score - 21 21 21 21 21 20     Interpretation of BONITA-7 score: 5-9 = mild anxiety, 10-14 = moderate anxiety, 15+ = severe anxiety. Recommend referral to behavioral health for scores 10 or greater. Safety: Denied any si/hi risk, intent, or plan     Diagnosis:    PTSD      Diagnosis Date    Asthma     Back pain     Bilateral ovarian cysts     Bipolar 1 disorder (HCC)     Cancer (HCC)     adenoca in situ cervix    DDD (degenerative disc disease), lumbar     Headache(784.0)     IBS (irritable bowel syndrome)     Multiple benign melanocytic nevi 4/10/2019    OCD (obsessive compulsive disorder)     PTSD (post-traumatic stress disorder)     Vertigo      Problems with primary support group, Problems related to the social environment, Occupational problems, Economic problems and Other psychosocial and environmental problems    Plan:  Pt interventions:    Practiced assertive communication, Trained in strategies for increasing balanced thinking, Discussed self-care (sleep, nutrition, rewarding activities, social support, exercise) and Supportive techniques    Pt Behavioral Change Plan:    1.  Call SSDI Mrs Miranda Gurrola to let her know you have the packet and are in the midst of gathering 12 months updated records  2. Ask  for Medical records office ([facility]) contact to request records for the last 12 months  3. Begin working on [employer] review  4. Continue to scheduled regular downtime for self at home daily  5.  Return to clinic for Dr Dary Martino in 1 month

## 2022-07-26 DIAGNOSIS — F60.3 BORDERLINE PERSONALITY DISORDER (HCC): ICD-10-CM

## 2022-07-26 RX ORDER — FLUOXETINE HYDROCHLORIDE 40 MG/1
CAPSULE ORAL
Qty: 60 CAPSULE | Refills: 2 | Status: SHIPPED | OUTPATIENT
Start: 2022-07-26

## 2022-08-02 ENCOUNTER — PATIENT MESSAGE (OUTPATIENT)
Dept: PRIMARY CARE CLINIC | Age: 42
End: 2022-08-02

## 2022-08-02 DIAGNOSIS — H91.90 HEARING LOSS, UNSPECIFIED HEARING LOSS TYPE, UNSPECIFIED LATERALITY: Primary | ICD-10-CM

## 2022-08-03 NOTE — TELEPHONE ENCOUNTER
From: Inocencia Galdamez  To: Dr. Luly Diaz: 8/2/2022 9:06 PM EDT  Subject: Referral for Audiologist    Hi,     I made an appointment with an audiologist after getting an insurance plan that includes hearing aides so I can finally get a hearing aide again because mask wearing has really made me notice how much I struggle with my hearing and the nurse said I needed a referral for the appointment.  Do I need to come in to see you for a referral??

## 2022-08-09 ENCOUNTER — PROCEDURE VISIT (OUTPATIENT)
Dept: AUDIOLOGY | Age: 42
End: 2022-08-09
Payer: MEDICARE

## 2022-08-09 DIAGNOSIS — H90.3 SENSORINEURAL HEARING LOSS, BILATERAL: Primary | ICD-10-CM

## 2022-08-09 PROCEDURE — 92557 COMPREHENSIVE HEARING TEST: CPT | Performed by: AUDIOLOGIST

## 2022-08-09 PROCEDURE — 92567 TYMPANOMETRY: CPT | Performed by: AUDIOLOGIST

## 2022-08-09 NOTE — PROGRESS NOTES
Vidya Deluna   1980, 37 y.o. female   [id number]       Referring Provider: Clementina Lennon MD   Referral Type: In an order in [facility]    Reason for Visit: Evaluation of suspected change in hearing, tinnitus, or balance. ADULT AUDIOLOGIC EVALUATION      Vidya Deluna is a 37 y.o. female seen today, 8/9/2022, for an initial audiologic evaluation in this office; she has known hearing loss in her left ear since birth. AUDIOLOGIC AND OTHER PERTINENT MEDICAL HISTORY:        Vidya Deluna noted known left ear hearing loss, onset at birth; hearing aid use as a child through college, then the hearing aid stopped functioning and was too expensive to replace, she was fit with a used hearing aid years later and discontinued use as it made her ear bleed, noted she still had difficulty hearing others from her left side even with a hearing aid; concern for dizziness, spinning sensation she relates to headaches. Vidya Deluna denied otalgia, aural fullness, otorrhea, tinnitus, history of occupational/recreational noise exposure, history of head trauma, history of ear surgery, and family history of hearing loss. IMPRESSIONS:       Today's results are consistent with left ear sensorineural hearing loss across all frequencies and right ear mild high frequency sensorineural hearing loss; left ear with very poor word recognition and right ear with excellent word recognition; both ears with excellent word recognition. Hearing loss is significant enough to result in difficulty understanding speech in most listening environments. Discussed good communication strategies and considerations for amplification; reccommended ENT consult for medical clearance. ASSESSMENT AND FINDINGS:       Otoscopy revealed: Clear ear canals bilaterally      RIGHT EAR:  Hearing Sensitivity: Within normal limits aside from mild high frequency sensorineural hearing loss [id number] Hz.   Speech Recognition Threshold: 95 dBHL, masked  Word Recognition: Very Poor (36%), based on NU-6 25-word list at 110 dBHL (limits of the equipment), masked, using recorded speech stimuli. Tympanometry: Normal peak pressure and compliance, Type A tympanogram, consistent with normal middle ear function. LEFT EAR:  Hearing Sensitivity: Moderate precipitously sloping to severe to profound sensorineural hearing loss. Speech Recognition Threshold: 20 dBHL  Word Recognition: Excellent (100%), based on NU-6 25-word list at 50 dBHL using recorded speech stimuli. Tympanometry: Normal peak pressure and compliance, Type A tympanogram, consistent with normal middle ear function. NOTE: An asymmetry of 35 dBHL or greater is present across all tested frequencies; there is also a clinically significant difference in word recognition with left ear (36%) worse than right ear (100%). Reliability: Good  Transducer: Inserts    See scanned audiogram dated 8/9/2022 for results. PATIENT EDUCATION:       The following items were discussed with the patient:   - Good Communication Strategies  - Hearing Loss and Hearing Aids  - Tinnitus Management Strategies      Educational information was shared in the After Visit Summary. RECOMMENDATIONS:                                                                                                                                                                                                                                                                      The following items are recommended based on patient report and results from today's appointment:  - Continue medical follow-up with Charmayne Sheer, MD.  - Retest hearing as medically indicated and/or sooner if a change in hearing is noted. - If desired, schedule a Hearing Aid Evaluation (HAE) appointment to discuss hearing aid options.  She was provided with a list of known Medicaid hearing aid providers.  - Recommended ENT consult for medical clearance for hearing aids; she also noted concern for dizziness/headaches, and sinus/allergy.  - Utilize \"Good Communication Strategies\" as discussed to assist in speech understanding with communication partners. - Maintain a sound enriched environment to assist in the management of tinnitus symptoms.          [facility] [place], [place]  Audiologist      Chart CC'd to: Radha Mcmahon MD       Degree of   Hearing Sensitivity dB Range   Within Normal Limits (WNL) 0 - 20   Mild 20 - 40   Moderate 40 - 55   Moderately-Severe 55 - 70   Severe 70 - 90   Profound 90 +

## 2022-08-09 NOTE — Clinical Note
Dr. Tj Medina,  Thank you for your referral for audiologic testing on this patient. Today's results are consistent with left ear sensorineural hearing loss across all frequencies and right ear mild high frequency sensorineural hearing loss; left ear with very poor word recognition and right ear with excellent word recognition; both ears with excellent word recognition. Hearing loss is significant enough to result in difficulty understanding speech in most listening environments. Discussed good communication strategies and considerations for amplification; reccommended ENT consult for medical clearance. If you have any questions, or if there is anything else you need, please let me know.    Cliff,  [address] [place], [place] Audiologist --- [address] ENT - Audiology

## 2022-08-09 NOTE — PATIENT INSTRUCTIONS
into a persons ear      Some additional items that may be helpful:   - Remain patient - this is important for both parties   - Write down items that still cannot be heard/understood. You may write with pen/paper or consider typing/texting on a cell phone or smart device. - If background noise is unavoidable, try to keep yourself in a good position in the room. By sitting at a bradshaw on the side of the restaurant (preferably a corner), it will be easier to communicate than if you were sitting at a table in the middle with background noise surrounding you. Keep yourself positioned away from music speakers or heavy foot traffic.   - If you have difficulty with the television, consider these options:      -- Use closed-captioning, which is a setting you can turn on that displays the spoken words in a written form on the screen. There may be a slight delay, but this can help fill in missing information. This can be especially helpful when watching programs with accented speech. -- Consider use of a sound bar or speakers that come from the front of the TV. With modern flat screen TVs, many of them have speakers that come out of the back of the device, which makes sound bounce off the wall behind it, then go into the room. Sound bars can allow the sound to go straight in your direction and can improve sound quality. -- Consider ear level devices to help improve the volume and/or sound quality of the program.  There are devices that work like headphones that you can adjust the volume for your ears while others can have the volume at a more comfortable level, such as \"TV Ears\". Most hearing aids have devices that allow them to connect directly to the TV and improve sound quality. Hearing Loss: Care Instructions  Your Care Instructions      Hearing loss is a sudden or slow decrease in how well you hear. It can range from mild to profound.  Permanent hearing loss can occur with aging, and it can happen when you are exposed long-term to loud noise. Examples include listening to loud music, riding motorcycles, or being around other loud machines. Hearing loss can affect your work and home life. It can make you feel lonely or depressed. You may feel that you have lost your independence. But hearing aids and other devices can help you hear better and feel connected to others. Follow-up care is a key part of your treatment and safety. Be sure to make and go to all appointments, and call your doctor if you are having problems. It's also a good idea to know your test results and keep a list of the medicines you take. How can you care for yourself at home? Avoid loud noises whenever possible. This helps keep your hearing from getting worse. Always wear hearing protection around loud noises. If appropriate, wear hearing aid(s) as directed. It is recommended that hearing aids are worn during all waking hours to keep your brain active and give it access to the sounds it is missing. If you are beginning your process with hearing aid(s), schedule a \"Hearing Aid Evaluation\" with an audiologist to discuss your lifestyle, features of hearing aid technology, and styles of hearing aids available. It is recommended that you contact your insurance company to determine if you have a hearing aid benefit, as this may dictate who you can see for these services. Have hearing tests as your doctor suggests. They can show whether your hearing has changed. Your hearing aid may need to be adjusted. Use other assistive devices as needed. These may include:  Telephone amplifiers and hearing aids that can connect to a television, stereo, radio, or microphone. Devices that use lights or vibrations. These alert you to the doorbell, a ringing telephone, or a baby monitor. Television closed-captioning. This shows the words at the bottom of the screen. Most new TVs can do this. TTY (text telephone).  This lets you type messages back and forth on the telephone instead of talking or listening. These devices are also called TDD. When messages are typed on the keyboard, they are sent over the phone line to a receiving TTY. The message is shown on a monitor. Use pagers, fax machines, text, and email if it is hard for you to communicate by telephone. Try to learn a listening technique called speech-reading. It is not lip-reading. You pay attention to people's gestures, expressions, posture, and tone of voice. These clues can help you understand what a person is saying. Face the person you are talking to, and have him or her face you. Make sure the lighting is good. You need to see the other person's face clearly. Think about counseling if you need help to adjust to your hearing loss. When should you call for help? Watch closely for changes in your health, and be sure to contact your doctor if:    You think your hearing is getting worse. You have new symptoms, such as dizziness or nausea. Tinnitus: Overview and Management Strategies          Many people have some ringing sounds in their ears once in a while. You may hear a roar, a hiss, a tinkle, or a buzz. The sound usually lasts only a few minutes. If it goes on all the time, you may have tinnitus. Tinnitus is usually caused by long-term exposure to loud noise. This damages the nerves in the inner ear. It can occur with all types of hearing loss. It may be a symptom of almost any ear problem. Tinnitus may be caused by a buildup of earwax. Or, it may be caused by ear infections or certain medicines (especially antibiotics or large amounts of aspirin). You can also hear noises in your ears because of an injury to the ears, drinking too much alcohol or caffeine, or a medical condition.   Other conditions may also contribute to tinnitus, including: head and neck trauma, temporomandibular joint disorder (TMJ), sinus pressure and barometric trauma, traumatic brain injury, metabolic disorders, autoimmune disorders, stress, and high blood pressure. You may need tests to evaluate your hearing and to find causes of long-lasting tinnitus. Your doctor may suggest one or more treatments to help you cope with the tinnitus. You can also do things at home to help reduce symptoms. Follow-up care is a key part of your treatment and safety. Be sure to make and go to all appointments, and call your doctor if you are having problems. It's also a good idea to know your test results and keep a list of the medicines you take. How can you care for yourself at home? Limit or cut out alcohol, caffeine, and sodium. They can make your symptoms worse. Do not smoke or use other tobacco products. Nicotine reduces blood flow to the ear and makes tinnitus worse. If you need help quitting, talk to your doctor about stop-smoking programs and medicines. These can increase your chances of quitting for good. Talk to your doctor about whether to stop taking aspirin and similar products such as ibuprofen or naproxen. Get exercise often. It can help improve blood flow to the ear. Ways to manage/cope with tinnitus  Some tinnitus may last a long time. To manage your tinnitus, try to: Avoid noises that you think caused your tinnitus. If you can't avoid loud noises, wear earplugs or earmuffs. Ignore the sound by paying attention to other things. Keeping your brain busy with other tasks or background noise can help your brain not focus on the tinnitus. Try to not give the tinnitus an emotional reaction. Do your best to ignore the sound and not let it bother you. Relax using biofeedback, meditation, or yoga. Feeling stressed and being tired can make tinnitus worse. Play music or white noise to help you sleep. Background noise may cover up the noise that you hear in your ears. You can buy a tabletop machine or a device that sits under your pillow to play soothing sounds, like ocean waves.   Smart phones have free apps, such as Whist, Relax Melodies, ReSound Relief, and White Noise Lite. These apps have different types of sounds/noise, some of which you can blend together to find sounds that are most soothing to you. Hearing aid technology, especially when there is some hearing loss, may help reduce tinnitus symptoms by giving your brain better access to the sounds it is missing. There are some hearing aids with built-in noise generator programs, which may help when amplification alone is not enough. Additional resources may be found through the American [employer]inn[employer] Association at www.roc.org    When should you call for help? Call 911 anytime you think you may need emergency care. For example, call if:    You have symptoms of a stroke. These may include:  Sudden numbness, tingling, weakness, or loss of movement in your face, arm, or leg, especially on only one side of your body. Sudden vision changes. Sudden trouble speaking. Sudden confusion or trouble understanding simple statements. Sudden problems with walking or balance. A sudden, severe headache that is different from past headaches. Call your doctor now or seek immediate medical care if:    You develop other symptoms. These may include hearing loss (or worse hearing loss), balance problems, dizziness, nausea, or vomiting. Watch closely for changes in your health, and be sure to contact your doctor if:    Your tinnitus moves from both ears to one ear. Your hearing loss gets worse within 1 day after an ear injury. Your tinnitus or hearing loss does not get better within 1 week after an ear injury. Your tinnitus bothers you enough that you want to take medicines to help you cope with it. If you notice changes in your tinnitus and/or your hearing, it is recommended that you have your hearing tested by your audiologist and to follow-up with your physician that manages your hearing loss (such as your ENT or Primary Care doctor).

## 2022-08-10 ENCOUNTER — OFFICE VISIT (OUTPATIENT)
Dept: ENT CLINIC | Age: 42
End: 2022-08-10
Payer: MEDICARE

## 2022-08-10 VITALS
HEART RATE: 77 BPM | HEIGHT: 65 IN | BODY MASS INDEX: 35.82 KG/M2 | WEIGHT: 215 LBS | DIASTOLIC BLOOD PRESSURE: 70 MMHG | SYSTOLIC BLOOD PRESSURE: 100 MMHG

## 2022-08-10 DIAGNOSIS — H90.A22 SENSORINEURAL HEARING LOSS (SNHL) OF LEFT EAR WITH RESTRICTED HEARING OF RIGHT EAR: ICD-10-CM

## 2022-08-10 DIAGNOSIS — J30.9 ALLERGIC RHINITIS, UNSPECIFIED SEASONALITY, UNSPECIFIED TRIGGER: Primary | ICD-10-CM

## 2022-08-10 DIAGNOSIS — R42 VERTIGO: ICD-10-CM

## 2022-08-10 DIAGNOSIS — G43.109 MIGRAINE WITH AURA AND WITHOUT STATUS MIGRAINOSUS, NOT INTRACTABLE: ICD-10-CM

## 2022-08-10 DIAGNOSIS — F45.8 BRUXISM: ICD-10-CM

## 2022-08-10 PROCEDURE — 99204 OFFICE O/P NEW MOD 45 MIN: CPT | Performed by: OTOLARYNGOLOGY

## 2022-08-10 PROCEDURE — G8427 DOCREV CUR MEDS BY ELIG CLIN: HCPCS | Performed by: OTOLARYNGOLOGY

## 2022-08-10 PROCEDURE — 92504 EAR MICROSCOPY EXAMINATION: CPT | Performed by: OTOLARYNGOLOGY

## 2022-08-10 PROCEDURE — 1036F TOBACCO NON-USER: CPT | Performed by: OTOLARYNGOLOGY

## 2022-08-10 PROCEDURE — G8417 CALC BMI ABV UP PARAM F/U: HCPCS | Performed by: OTOLARYNGOLOGY

## 2022-08-10 RX ORDER — FLUTICASONE PROPIONATE 50 MCG
2 SPRAY, SUSPENSION (ML) NASAL DAILY
Qty: 16 G | Refills: 5 | Status: SHIPPED | OUTPATIENT
Start: 2022-08-10 | End: 2022-09-09

## 2022-08-10 RX ORDER — CETIRIZINE HYDROCHLORIDE 10 MG/1
10 TABLET ORAL DAILY
Qty: 30 TABLET | Refills: 5 | Status: SHIPPED | OUTPATIENT
Start: 2022-08-10 | End: 2022-09-09

## 2022-08-10 RX ORDER — AZELASTINE 1 MG/ML
1 SPRAY, METERED NASAL 2 TIMES DAILY
Qty: 30 ML | Refills: 6 | Status: SHIPPED | OUTPATIENT
Start: 2022-08-10

## 2022-08-10 ASSESSMENT — ENCOUNTER SYMPTOMS
CONSTIPATION: 0
BLOOD IN STOOL: 0
SINUS PAIN: 0
SINUS PRESSURE: 1
NAUSEA: 0
VOMITING: 0
EYE ITCHING: 0
COUGH: 0
EYE DISCHARGE: 0
STRIDOR: 0
WHEEZING: 0
FACIAL SWELLING: 0
SHORTNESS OF BREATH: 0
RHINORRHEA: 1
VOICE CHANGE: 0
DIARRHEA: 0
PHOTOPHOBIA: 0
TROUBLE SWALLOWING: 0
BACK PAIN: 0
CHOKING: 0
COLOR CHANGE: 0
SORE THROAT: 0

## 2022-08-11 ENCOUNTER — TELEMEDICINE (OUTPATIENT)
Dept: PSYCHOLOGY | Age: 42
End: 2022-08-11
Payer: MEDICARE

## 2022-08-11 DIAGNOSIS — F43.10 PTSD (POST-TRAUMATIC STRESS DISORDER): Primary | ICD-10-CM

## 2022-08-11 PROCEDURE — 90832 PSYTX W PT 30 MINUTES: CPT | Performed by: PSYCHOLOGIST

## 2022-08-11 NOTE — PROGRESS NOTES
Behavioral Health Consultation Follow-up  Keaton Dawson PsyD  Psychologist  2022  11:09 AM    NOTE - this visit conducted via audiovisual means : MyChart, Doxy, etc, in accordance with CMS guidelines. During ROUKT-42 public health emergency. The patient (or guardian if applicable) is aware that this is a billable service, which includes applicable co-pays. This Virtual Visit was conducted with patient's (and/or legal guardian's) consent. Telemedicine APA guidelines were reviewed and discussed. The patient was located in a state where the provider was licensed to provide care. [facility] provider[facility]  Location of patient: home     Time spent with Patient: 30 minutes  This is patient's     [facility] appointment. Reason for Consult:  PTSD  Referring Provider: Tere Sena MD   [address]  [address],  [address]    Feedback given to PCP. S:    Last appt: . Good news: new  for SSDI, he has been very helpful, starting to think it was just an update not a review of whether or not it was     Court case in regards to  father: just heard back from commanding officer in regards to this case, which sparked pt's  to call pt, stated \"I just talked to moose and waiting to here back from \". More focus on how to manage the boundary of responsibility when she struggles with blame/shame tied to this loss. Explored the function of how taking too much responsibility in some ways may be a response to trying to take some \"control\" over the grief related to the death of her father.      O:  MSE:    Appearance    alert, cooperative  Appetite abnormal: poor  Sleep disturbance Yes  Fatigue Yes  Loss of pleasure Yes  Impulsive behavior No  Speech    normal rate, normal volume, and well articulated  Mood    Anxious  Depressed  Affect    normal affect  Thought Content    intact  Thought Process    linear, goal directed, and coherent  Associations logical connections  Insight    Good  Judgment    Intact  Orientation    oriented to person, place, time, and general circumstances  Memory    recent and remote memory intact  Attention/Concentration    intact  Morbid ideation No  Suicide Assessment    no suicidal ideation    History:    Medications:   Current Outpatient Medications   Medication Sig Dispense Refill    cetirizine (ZYRTEC) 10 MG tablet Take 1 tablet by mouth in the morning. 30 tablet 5    fluticasone (FLONASE) 50 MCG/ACT nasal spray 2 sprays by Nasal route in the morning. 16 g 5    azelastine (ASTELIN) 0.1 % nasal spray 1 spray by Nasal route in the morning and 1 spray before bedtime. Use in each nostril as directed. 30 mL 6    FLUoxetine (PROZAC) 40 MG capsule TAKE TWO CAPSULES BY MOUTH DAILY 60 capsule 2    SUMAtriptan (IMITREX) 50 MG tablet Take 1 tablet by mouth once as needed for Migraine 9 tablet 1    ondansetron (ZOFRAN) 4 MG tablet TAKE 1 TABLET EVERY 8 HOURS AS NEEDED FOR NAUSEA AND VOMITING 30 tablet 3    meclizine (ANTIVERT) 12.5 MG tablet TAKE 1 TABLET  3 TIMES DAILY AS NEEDED FOR DIZZINESS 30 tablet 0    albuterol sulfate  (90 Base) MCG/ACT inhaler INHALE 2 PUFFS EVERY 6 HOURS AS NEEDED FOR WHEEZING OR SHORTNESS OF BREATH 1 each 5    albuterol (PROVENTIL) (2.5 MG/3ML) 0.083% nebulizer solution Take 3 mLs by nebulization every 4 hours as needed for Wheezing or Shortness of Breath 120 each 3    fluticasone-salmeterol (ADVAIR) 500-50 MCG/ACT AEPB diskus inhaler Inhale 1 puff into the lungs every 12 hours 60 each 3     No current facility-administered medications for this visit. Social History:   Social History     Socioeconomic History    Marital status: Life Partner     Spouse name: Not on file    Number of children: 0    Years of education: 16    Highest education level:  Bachelor's degree (e.g., BA, AB, BS)   Occupational History    Not on file   Tobacco Use    Smoking status: Never    Smokeless tobacco: Never   Vaping Use Vaping Use: Never used   Substance and Sexual Activity    Alcohol use: Yes     Comment: occasional.  Drank heavily when she was younger    Drug use: Yes     Types: Marijuana Barlowana maria Rodríguez)     Comment: Edibles and THC pen    Sexual activity: Yes     Partners: Male   Other Topics Concern    Not on file   Social History Narrative    Patient does hold a degree in psychology. She has been with the current person that she is with for approximately 20 years, no unstable housing at this time but does have a history of homelessness. Patient has no children and is not currently working. No guns at home, no  service for the patient. Patient has been in a legal luciano with her aunt over monies her father was supposed to left her, with this luciano having lasted approximately 4 years. Social Determinants of Health     Financial Resource Strain: Not on file   Food Insecurity: Not on file   Transportation Needs: Not on file   Physical Activity: Not on file   Stress: Not on file   Social Connections: Not on file   Intimate Partner Violence: Not on file   Housing Stability: Not on file       TOBACCO:   reports that she has never smoked. She has never used smokeless tobacco.  ETOH:   reports current alcohol use.     Family History:   Family History   Problem Relation Age of Onset    Other Mother         autoimmune dz    Other Father         cirrhosis    Substance Abuse Father     Cancer Father     Breast Cancer Maternal Grandmother         On hospice in patient's home    Suicide Other         Uncle killed himself when patient age 4    Suicide Cousin         Cousin killed himself in 2017       A:    See S: above    PHQ Scores 4/19/2022 9/22/2021 6/10/2021 12/13/2019 10/22/2019 8/13/2019 7/22/2019   PHQ2 Score 6 6 6 6 6 6 6   PHQ9 Score 27 24 27 24 24 27 26     Interpretation of Total Score Depression Severity: 1-4 = Minimal depression, 5-9 = Mild depression, 10-14 = Moderate depression, 15-19 = Moderately severe depression, 20-27

## 2022-08-11 NOTE — PATIENT INSTRUCTIONS
1. Continue to work with new  with SSDI as needed  2. Continue to scheduled regular downtime for self at home daily  3. Continue to be mindful of the guilt/blame/shame trauma \"hammer\" used to ease the emotional pain of losing your father  4.  Return to clinic for Dr Lauren Magaña in 1 month

## 2022-09-14 ENCOUNTER — OFFICE VISIT (OUTPATIENT)
Dept: PSYCHOLOGY | Age: 42
End: 2022-09-14
Payer: MEDICARE

## 2022-09-14 DIAGNOSIS — F43.10 PTSD (POST-TRAUMATIC STRESS DISORDER): Primary | ICD-10-CM

## 2022-09-14 PROCEDURE — 1036F TOBACCO NON-USER: CPT | Performed by: PSYCHOLOGIST

## 2022-09-14 PROCEDURE — 90834 PSYTX W PT 45 MINUTES: CPT | Performed by: PSYCHOLOGIST

## 2022-09-14 NOTE — PATIENT INSTRUCTIONS
Continue to work with new  with SSDI as needed  2. Continue to scheduled regular downtime for self at home daily  3. Continue to be mindful of the guilt/blame/shame trauma \"hammer\" used to ease the emotional pain of losing your father  4. Continue to maintain emotional boundaries with mother  5.  Continue to schedule consistent down time for yourself for PTSD management   6 Return to clinic for Dr Amy Yeboah in 3 weeks

## 2022-09-14 NOTE — PROGRESS NOTES
Behavioral Health Consultation Follow-up  Bebe De Leon PsyD  Psychologist  9/14/2022  3:08 PM      Time spent with Patient: 40 minutes  This is patient's  14 th    [facility] appointment. Reason for Consult:  PTSD  Referring Provider: Irineo Amin MD  [address]  [address],  [address]    Feedback given to PCP. S:    Last appt: 8/11. Stressor: ongoing grief recovery in regards to recent death of grandmother. SSDI approved for the next 5 years which reduces her financial stress greatly! More processing of normal grief reaction. [birth year] grandmother's birthday so we discussed ways to support herself emotionally at this time. Trauma trigger: back to being 16years old taking care of her mother's home. Wants to ask her mother to move out, would like to give her 6 months but knows her mother will make her the \"bad kyle\". O:  MSE:    Appearance    alert, cooperative  Appetite normal  Sleep disturbance Yes  Fatigue Yes  Loss of pleasure Yes  Impulsive behavior No  Speech    normal rate, normal volume, and well articulated  Mood    sad about death of grandmother, appropriate   Affect    normal affect  Thought Content    intact  Thought Process    linear, goal directed, and coherent  Associations    logical connections  Insight    Good  Judgment    Intact  Orientation    oriented to person, place, time, and general circumstances  Memory    recent and remote memory intact  Attention/Concentration    intact  Morbid ideation No  Suicide Assessment    no suicidal ideation    History:    Medications:   Current Outpatient Medications   Medication Sig Dispense Refill    fluticasone (FLONASE) 50 MCG/ACT nasal spray 2 sprays by Nasal route in the morning. 16 g 5    azelastine (ASTELIN) 0.1 % nasal spray 1 spray by Nasal route in the morning and 1 spray before bedtime. Use in each nostril as directed.  30 mL 6    FLUoxetine (PROZAC) 40 MG capsule TAKE TWO CAPSULES BY MOUTH DAILY 60 capsule 2 SUMAtriptan (IMITREX) 50 MG tablet Take 1 tablet by mouth once as needed for Migraine 9 tablet 1    ondansetron (ZOFRAN) 4 MG tablet TAKE 1 TABLET EVERY 8 HOURS AS NEEDED FOR NAUSEA AND VOMITING 30 tablet 3    meclizine (ANTIVERT) 12.5 MG tablet TAKE 1 TABLET  3 TIMES DAILY AS NEEDED FOR DIZZINESS 30 tablet 0    albuterol sulfate  (90 Base) MCG/ACT inhaler INHALE 2 PUFFS EVERY 6 HOURS AS NEEDED FOR WHEEZING OR SHORTNESS OF BREATH 1 each 5    albuterol (PROVENTIL) (2.5 MG/3ML) 0.083% nebulizer solution Take 3 mLs by nebulization every 4 hours as needed for Wheezing or Shortness of Breath 120 each 3    fluticasone-salmeterol (ADVAIR) 500-50 MCG/ACT AEPB diskus inhaler Inhale 1 puff into the lungs every 12 hours 60 each 3     No current facility-administered medications for this visit. Social History:   Social History     Socioeconomic History    Marital status: Life Partner     Spouse name: Not on file    Number of children: 0    Years of education: 16    Highest education level: Bachelor's degree (e.g., BA, AB, BS)   Occupational History    Not on file   Tobacco Use    Smoking status: Never    Smokeless tobacco: Never   Vaping Use    Vaping Use: Never used   Substance and Sexual Activity    Alcohol use: Yes     Comment: occasional.  Drank heavily when she was younger    Drug use: Yes     Types: Marijuana [place]etta [place])     Comment: Edibles and THC pen    Sexual activity: Yes     Partners: Male   Other Topics Concern    Not on file   Social History Narrative    Patient does hold a degree in psychology. She has been with the current person that she is with for approximately 20 years, no unstable housing at this time but does have a history of homelessness. Patient has no children and is not currently working. No guns at home, no  service for the patient. Patient has been in a legal luciano with her aunt over monies her father was supposed to left her, with this luciano having lasted approximately 4 years. Social Determinants of Health     Financial Resource Strain: Not on file   Food Insecurity: Not on file   Transportation Needs: Not on file   Physical Activity: Not on file   Stress: Not on file   Social Connections: Not on file   Intimate Partner Violence: Not on file   Housing Stability: Not on file       TOBACCO:   reports that she has never smoked. She has never used smokeless tobacco.  ETOH:   reports current alcohol use. Family History:   Family History   Problem Relation Age of Onset    Other Mother         autoimmune dz    Other Father         cirrhosis    Substance Abuse Father     Cancer Father     Breast Cancer Maternal Grandmother         On hospice in patient's home    Suicide Other         Uncle killed himself when patient age 4    Suicide Cousin         Cousin killed himself in 2017       A:    See S: above    PHQ Scores 4/19/2022 9/22/2021 6/10/2021 12/13/2019 10/22/2019 8/13/2019 7/22/2019   PHQ2 Score 6 6 6 6 6 6 6   PHQ9 Score 27 24 27 24 24 27 26     Interpretation of Total Score Depression Severity: 1-4 = Minimal depression, 5-9 = Mild depression, 10-14 = Moderate depression, 15-19 = Moderately severe depression, 20-27 = Severe depression     BONITA 7 SCORE 4/19/2022 9/22/2021 6/10/2021 12/13/2019 8/13/2019 7/22/2019 6/25/2019   BONITA-7 Total Score 21 - - - - - -   BONITA-7 Total Score - 21 21 21 21 21 20     Interpretation of BONITA-7 score: 5-9 = mild anxiety, 10-14 = moderate anxiety, 15+ = severe anxiety. Recommend referral to behavioral health for scores 10 or greater.      Safety: Denied any si/hi risk, intent, or plan       Diagnosis:    PTSD      Diagnosis Date    Asthma     Back pain     Bilateral ovarian cysts     Bipolar 1 disorder (HCC)     Cancer (HCC)     adenoca in situ cervix    DDD (degenerative disc disease), lumbar     Headache(784.0)     IBS (irritable bowel syndrome)     Multiple benign melanocytic nevi 4/10/2019    OCD (obsessive compulsive disorder)     PTSD (post-traumatic stress disorder)     Vertigo      Problems with primary support group, Problems related to the social environment, Economic problems, and Other psychosocial and environmental problems      Plan:  Pt interventions:    Practiced assertive communication, Trained in strategies for increasing balanced thinking, Discussed self-care (sleep, nutrition, rewarding activities, social support, exercise), Discussed and problem-solved barriers in adhering to behavioral change plan, and Supportive techniques    Pt Behavioral Change Plan:    Continue to work with new  with SSDI as needed  2. Continue to scheduled regular downtime for self at home daily  3. Continue to be mindful of the guilt/blame/shame trauma \"hammer\" used to ease the emotional pain of losing your father  4. Continue to maintain emotional boundaries with mother  5.  Continue to schedule consistent down time for yourself for PTSD management   6 Return to clinic for Dr Juarez Marques in 3 weeks
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
## 2022-11-10 ENCOUNTER — TELEMEDICINE (OUTPATIENT)
Dept: PSYCHOLOGY | Age: 42
End: 2022-11-10
Payer: MEDICARE

## 2022-11-10 DIAGNOSIS — F43.10 PTSD (POST-TRAUMATIC STRESS DISORDER): Primary | ICD-10-CM

## 2022-11-10 PROCEDURE — 90832 PSYTX W PT 30 MINUTES: CPT | Performed by: PSYCHOLOGIST

## 2022-11-10 NOTE — PROGRESS NOTES
Behavioral Health Consultation Follow-up  Kalyani Carter PsyD  Psychologist  11/10/2022  2:36 PM    NOTE - this visit conducted via audiovisual means : MyChart, Doxy, etc, in accordance with CMS guidelines. During BXREL-99 public health emergency. The patient (or guardian if applicable) is aware that this is a billable service, which includes applicable co-pays. This Virtual Visit was conducted with patient's (and/or legal guardian's) consent. Telemedicine APA guidelines were reviewed and discussed. The patient was located in a state where the provider was licensed to provide care. Video trouble, patchy. Location of provider: [facility]  Location of patient: home     Time spent with Patient: 30 minutes  This is patient's  15 th    [facility] appointment. Reason for Consult:  PTSD  Referring Provider: Uyen Pizarro MD  [address]  [address],  [address]    Feedback given to PCP. S:    Last appt: 9/14. Death of grandmother grief continues. Stressors: father's case, Sadiq's mother, Moniac Gallagher had MI, 1 week in hospital at [facility]. 5 hour drive to her. On way back to [place], in [place] [address]. Focused on self care. Self care plan: joining gym, swimming and aqua therapy really helps chronic pain, for birthday going to [address]. Plop into bed and cuddle with dog, going to deal with fall leaves and \"set up yard\" but agreed she is going to need rest tonight first. Needing permission to do this.      O:  MSE:    Appetite abnormal: poor, emotional eating   Sleep disturbance Yes  Fatigue Yes  Loss of pleasure Yes  Impulsive behavior No  Speech    normal rate, normal volume, and well articulated  Mood    stable, managing   Thought Content    intact  Thought Process    linear, goal directed, and coherent  Associations    logical connections  Insight    Good  Judgment    Intact  Orientation    oriented to person, place, time, and general circumstances  Memory    recent and remote memory intact  Attention/Concentration    intact  Morbid ideation No  Suicide Assessment    no suicidal ideation    History:    Medications:   Current Outpatient Medications   Medication Sig Dispense Refill    fluticasone (FLONASE) 50 MCG/ACT nasal spray 2 sprays by Nasal route in the morning. 16 g 5    azelastine (ASTELIN) 0.1 % nasal spray 1 spray by Nasal route in the morning and 1 spray before bedtime. Use in each nostril as directed. 30 mL 6    FLUoxetine (PROZAC) 40 MG capsule TAKE TWO CAPSULES BY MOUTH DAILY 60 capsule 2    SUMAtriptan (IMITREX) 50 MG tablet Take 1 tablet by mouth once as needed for Migraine 9 tablet 1    ondansetron (ZOFRAN) 4 MG tablet TAKE 1 TABLET EVERY 8 HOURS AS NEEDED FOR NAUSEA AND VOMITING 30 tablet 3    meclizine (ANTIVERT) 12.5 MG tablet TAKE 1 TABLET  3 TIMES DAILY AS NEEDED FOR DIZZINESS 30 tablet 0    albuterol sulfate  (90 Base) MCG/ACT inhaler INHALE 2 PUFFS EVERY 6 HOURS AS NEEDED FOR WHEEZING OR SHORTNESS OF BREATH 1 each 5    albuterol (PROVENTIL) (2.5 MG/3ML) 0.083% nebulizer solution Take 3 mLs by nebulization every 4 hours as needed for Wheezing or Shortness of Breath 120 each 3    fluticasone-salmeterol (ADVAIR) 500-50 MCG/ACT AEPB diskus inhaler Inhale 1 puff into the lungs every 12 hours 60 each 3     No current facility-administered medications for this visit. Social History:   Social History     Socioeconomic History    Marital status: Life Partner     Spouse name: Not on file    Number of children: 0    Years of education: 16    Highest education level:  Bachelor's degree (e.g., BA, AB, BS)   Occupational History    Not on file   Tobacco Use    Smoking status: Never    Smokeless tobacco: Never   Vaping Use    Vaping Use: Never used   Substance and Sexual Activity    Alcohol use: Yes     Comment: occasional.  Drank heavily when she was younger    Drug use: Yes     Types: Marijuana Dana Vaughan)     Comment: Edibles and THC pen    Sexual activity: Yes     Partners: Male Other Topics Concern    Not on file   Social History Narrative    Patient does hold a degree in psychology. She has been with the current person that she is with for approximately 20 years, no unstable housing at this time but does have a history of homelessness. Patient has no children and is not currently working. No guns at home, no  service for the patient. Patient has been in a legal luciano with her aunt over monies her father was supposed to left her, with this luciano having lasted approximately 4 years. Social Determinants of Health     Financial Resource Strain: Not on file   Food Insecurity: Not on file   Transportation Needs: Not on file   Physical Activity: Not on file   Stress: Not on file   Social Connections: Not on file   Intimate Partner Violence: Not on file   Housing Stability: Not on file       TOBACCO:   reports that she has never smoked. She has never used smokeless tobacco.  ETOH:   reports current alcohol use.     Family History:   Family History   Problem Relation Age of Onset    Other Mother         autoimmune dz    Other Father         cirrhosis    Substance Abuse Father     Cancer Father     Breast Cancer Maternal Grandmother         On hospice in patient's home    Suicide Other         Uncle killed himself when patient age 4    Suicide Cousin         Cousin killed himself in 2017       A:    See S: above    PHQ Scores 4/19/2022 9/22/2021 6/10/2021 12/13/2019 10/22/2019 8/13/2019 7/22/2019   PHQ2 Score 6 6 6 6 6 6 6   PHQ9 Score 27 24 27 24 24 27 26     Interpretation of Total Score Depression Severity: 1-4 = Minimal depression, 5-9 = Mild depression, 10-14 = Moderate depression, 15-19 = Moderately severe depression, 20-27 = Severe depression     BONITA 7 SCORE 4/19/2022 9/22/2021 6/10/2021 12/13/2019 8/13/2019 7/22/2019 6/25/2019   BONITA-7 Total Score 21 - - - - - -   BONITA-7 Total Score - 21 21 21 21 21 20     Interpretation of BONITA-7 score: 5-9 = mild anxiety, 10-14 = moderate anxiety, 15+ = severe anxiety. Recommend referral to behavioral health for scores 10 or greater. Safety: Denied any si/hi risk    Diagnosis:    PTSD      Diagnosis Date    Asthma     Back pain     Bilateral ovarian cysts     Bipolar 1 disorder (HCC)     Cancer (HCC)     adenoca in situ cervix    DDD (degenerative disc disease), lumbar     Headache(784.0)     IBS (irritable bowel syndrome)     Multiple benign melanocytic nevi 4/10/2019    OCD (obsessive compulsive disorder)     PTSD (post-traumatic stress disorder)     Vertigo      Problems with primary support group, Problems related to the social environment, and Other psychosocial and environmental problems    Plan:  Pt interventions:    Practiced assertive communication, Trained in strategies for increasing balanced thinking, and Discussed self-care (sleep, nutrition, rewarding activities, social support, exercise)    Pt Behavioral Change Plan:    Continue to work with new  with SSDI as needed  2. Continue to scheduled regular downtime for self at home daily  3. Continue to be mindful of the guilt/blame/shame trauma \"hammer\" used to ease the emotional pain of losing your father  4. Continue to maintain emotional boundaries with mother  5. Continue to schedule consistent down time for yourself for PTSD management   6. Rest and tub soaking tonight  7. Raking leaves and yard work this weekend   8.  Get [employer] membership over the next month  9. Return to clinic for Dr Leatha Ambrose in 2-4 weeks

## 2022-11-10 NOTE — PATIENT INSTRUCTIONS
Continue to work with new  with SSDI as needed  2. Continue to scheduled regular downtime for self at home daily  3. Continue to be mindful of the guilt/blame/shame trauma \"hammer\" used to ease the emotional pain of losing your father  4. Continue to maintain emotional boundaries with mother  5. Continue to schedule consistent down time for yourself for PTSD management   6. Resting, soaking, and sleeping tonight for stress reduction  7. Raking leaves and yard work this weekend   8.  Get [employer] membership over the next month  9. Return to clinic for Dr Laureano Garcias in 2-4 weeks

## 2023-01-24 ENCOUNTER — PATIENT MESSAGE (OUTPATIENT)
Dept: PRIMARY CARE CLINIC | Age: 43
End: 2023-01-24

## 2023-01-25 NOTE — TELEPHONE ENCOUNTER
From: Mally Sat  To: Dr. Genoveva Lambert: 1/24/2023 6:35 PM EST  Subject: Exposed to Lavell Ureña,     My partner was hospitalized with c diff and so I was exposed and I wondering if there is tests I need or any antibiotics or anything. Thank you.

## 2023-02-13 ENCOUNTER — OFFICE VISIT (OUTPATIENT)
Dept: PSYCHOLOGY | Age: 43
End: 2023-02-13
Payer: MEDICARE

## 2023-02-13 DIAGNOSIS — F43.10 PTSD (POST-TRAUMATIC STRESS DISORDER): Primary | ICD-10-CM

## 2023-02-13 PROCEDURE — 90832 PSYTX W PT 30 MINUTES: CPT | Performed by: PSYCHOLOGIST

## 2023-02-13 PROCEDURE — 1036F TOBACCO NON-USER: CPT | Performed by: PSYCHOLOGIST

## 2023-02-13 ASSESSMENT — PATIENT HEALTH QUESTIONNAIRE - PHQ9
SUM OF ALL RESPONSES TO PHQ9 QUESTIONS 1 & 2: 4
1. LITTLE INTEREST OR PLEASURE IN DOING THINGS: 2
4. FEELING TIRED OR HAVING LITTLE ENERGY: 3
7. TROUBLE CONCENTRATING ON THINGS, SUCH AS READING THE NEWSPAPER OR WATCHING TELEVISION: 2
10. IF YOU CHECKED OFF ANY PROBLEMS, HOW DIFFICULT HAVE THESE PROBLEMS MADE IT FOR YOU TO DO YOUR WORK, TAKE CARE OF THINGS AT HOME, OR GET ALONG WITH OTHER PEOPLE: 1
2. FEELING DOWN, DEPRESSED OR HOPELESS: 2
3. TROUBLE FALLING OR STAYING ASLEEP: 3
6. FEELING BAD ABOUT YOURSELF - OR THAT YOU ARE A FAILURE OR HAVE LET YOURSELF OR YOUR FAMILY DOWN: 1
SUM OF ALL RESPONSES TO PHQ QUESTIONS 1-9: 19
8. MOVING OR SPEAKING SO SLOWLY THAT OTHER PEOPLE COULD HAVE NOTICED. OR THE OPPOSITE, BEING SO FIGETY OR RESTLESS THAT YOU HAVE BEEN MOVING AROUND A LOT MORE THAN USUAL: 3
SUM OF ALL RESPONSES TO PHQ QUESTIONS 1-9: 19
5. POOR APPETITE OR OVEREATING: 3
9. THOUGHTS THAT YOU WOULD BE BETTER OFF DEAD, OR OF HURTING YOURSELF: 0
SUM OF ALL RESPONSES TO PHQ QUESTIONS 1-9: 19
SUM OF ALL RESPONSES TO PHQ QUESTIONS 1-9: 19

## 2023-02-13 ASSESSMENT — ANXIETY QUESTIONNAIRES
4. TROUBLE RELAXING: 3-NEARLY EVERY DAY
1. FEELING NERVOUS, ANXIOUS, OR ON EDGE: 1
3. WORRYING TOO MUCH ABOUT DIFFERENT THINGS: 2-OVER HALF THE DAYS
GAD7 TOTAL SCORE: 14
5. BEING SO RESTLESS THAT IT IS HARD TO SIT STILL: 3-NEARLY EVERY DAY
7. FEELING AFRAID AS IF SOMETHING AWFUL MIGHT HAPPEN: 2-OVER HALF THE DAYS
2. NOT BEING ABLE TO STOP OR CONTROL WORRYING: 2-OVER HALF THE DAYS
6. BECOMING EASILY ANNOYED OR IRRITABLE: 1-SEVERAL DAYS

## 2023-02-13 NOTE — PROGRESS NOTES
Behavioral Health Consultation Follow-up  Ginny Yates PsyD  Psychologist  2/13/2023  2:32 PM      Time spent with Patient: 30 minutes  This is patient's  16 th   [facility]. Reason for Consult:  PTSD  Referring Provider: Alexander Sun MD  [address]  [address],  [address]    Feedback given to PCP. S:    Last appt: 11/10/22. Stressor: 21 year dating relationship over on New years bitter sweet, sad but feeling some relief in not having to take care of partner. More time for herself to work on grief journal/project. Clarity today on boundaries with ex partner and his addiction and his narcissistic personality style. O:  MSE:    Appearance    alert, cooperative  Appetite abnormal: poor  Sleep disturbance Yes  Fatigue Yes  Loss of pleasure Yes  Impulsive behavior No  Speech    normal rate, normal volume, and well articulated  Mood    stable, managing   Affect    normal affect  Thought Content    intact  Thought Process    linear, goal directed, and coherent  Associations    logical connections  Insight    Good  Judgment    Intact  Orientation    oriented to person, place, time, and general circumstances  Memory    recent and remote memory intact  Attention/Concentration    intact  Morbid ideation No  Suicide Assessment    no suicidal ideation    History:    Medications:   Current Outpatient Medications   Medication Sig Dispense Refill    fluticasone (FLONASE) 50 MCG/ACT nasal spray 2 sprays by Nasal route in the morning. 16 g 5    azelastine (ASTELIN) 0.1 % nasal spray 1 spray by Nasal route in the morning and 1 spray before bedtime. Use in each nostril as directed.  30 mL 6    FLUoxetine (PROZAC) 40 MG capsule TAKE TWO CAPSULES BY MOUTH DAILY 60 capsule 2    SUMAtriptan (IMITREX) 50 MG tablet Take 1 tablet by mouth once as needed for Migraine 9 tablet 1    ondansetron (ZOFRAN) 4 MG tablet TAKE 1 TABLET EVERY 8 HOURS AS NEEDED FOR NAUSEA AND VOMITING 30 tablet 3    meclizine (ANTIVERT) 12.5 MG tablet TAKE 1 TABLET  3 TIMES DAILY AS NEEDED FOR DIZZINESS 30 tablet 0    albuterol sulfate  (90 Base) MCG/ACT inhaler INHALE 2 PUFFS EVERY 6 HOURS AS NEEDED FOR WHEEZING OR SHORTNESS OF BREATH 1 each 5    albuterol (PROVENTIL) (2.5 MG/3ML) 0.083% nebulizer solution Take 3 mLs by nebulization every 4 hours as needed for Wheezing or Shortness of Breath 120 each 3    fluticasone-salmeterol (ADVAIR) 500-50 MCG/ACT AEPB diskus inhaler Inhale 1 puff into the lungs every 12 hours 60 each 3     No current facility-administered medications for this visit. Social History:   Social History     Socioeconomic History    Marital status: Life Partner     Spouse name: Not on file    Number of children: 0    Years of education: 16    Highest education level: Bachelor's degree (e.g., BA, AB, BS)   Occupational History    Not on file   Tobacco Use    Smoking status: Never    Smokeless tobacco: Never   Vaping Use    Vaping Use: Never used   Substance and Sexual Activity    Alcohol use: Yes     Comment: occasional.  Drank heavily when she was younger    Drug use: Yes     Types: Marijuana Valdene Call)     Comment: Edibles and THC pen    Sexual activity: Yes     Partners: Male   Other Topics Concern    Not on file   Social History Narrative    Patient does hold a degree in psychology. She has been with the current person that she is with for approximately 20 years, no unstable housing at this time but does have a history of homelessness. Patient has no children and is not currently working. No guns at home, no  service for the patient. Patient has been in a legal luciano with her aunt over monies her father was supposed to left her, with this luciano having lasted approximately 4 years.      Social Determinants of Health     Financial Resource Strain: Not on file   Food Insecurity: Not on file   Transportation Needs: Not on file   Physical Activity: Not on file   Stress: Not on file   Social Connections: Not on file   Intimate Partner Violence: Not on file   Housing Stability: Not on file       TOBACCO:   reports that she has never smoked. She has never used smokeless tobacco.  ETOH:   reports current alcohol use. Family History:   Family History   Problem Relation Age of Onset    Other Mother         autoimmune dz    Other Father         cirrhosis    Substance Abuse Father     Cancer Father     Breast Cancer Maternal Grandmother         On hospice in patient's home    Suicide Other         Uncle killed himself when patient age 4    Suicide Cousin         Cousin killed himself in 2017     A:    See S: above    PHQ Scores 2/13/2023 4/19/2022 9/22/2021 6/10/2021 12/13/2019 10/22/2019 8/13/2019   PHQ2 Score 4 6 6 6 6 6 6   PHQ9 Score 19 27 24 27 24 24 27     Interpretation of Total Score Depression Severity: 1-4 = Minimal depression, 5-9 = Mild depression, 10-14 = Moderate depression, 15-19 = Moderately severe depression, 20-27 = Severe depression     BONITA 7 SCORE 2/13/2023 4/19/2022 9/22/2021 6/10/2021 12/13/2019 8/13/2019 7/22/2019   BONITA-7 Total Score - 21 - - - - -   BONITA-7 Total Score 14 - 21 21 21 21 21     Interpretation of BONITA-7 score: 5-9 = mild anxiety, 10-14 = moderate anxiety, 15+ = severe anxiety. Recommend referral to behavioral health for scores 10 or greater.      Safety: Denied any si/hi risk, intent, or plan     Diagnosis:    PTSD      Diagnosis Date    Asthma     Back pain     Bilateral ovarian cysts     Bipolar 1 disorder (HCC)     Cancer (HCC)     adenoca in situ cervix    DDD (degenerative disc disease), lumbar     Headache(784.0)     IBS (irritable bowel syndrome)     Multiple benign melanocytic nevi 4/10/2019    OCD (obsessive compulsive disorder)     PTSD (post-traumatic stress disorder)     Vertigo      Problems with primary support group, Problems related to the social environment, and Other psychosocial and environmental problems    Plan:  Pt interventions:    Practiced assertive communication, Trained in strategies for increasing balanced thinking, and Discussed self-care (sleep, nutrition, rewarding activities, social support, exercise)    Pt Behavioral Change Plan:    Continue to work with new  with SSDI as needed  2. Continue to scheduled regular downtime for self at home daily  3. Continue to be mindful of the guilt/blame/shame trauma \"hammer\" used to ease the emotional pain of losing your father  4. Continue to maintain emotional boundaries with mother and ex boyfriend  5. Continue to schedule consistent down time for yourself for PTSD management   6. Get [employer] membership over the next month  7.  Return to clinic for Dr Haley Stoner in 1 month

## 2023-02-14 NOTE — PATIENT INSTRUCTIONS
Continue to work with new  with SSDI as needed  2. Continue to scheduled regular downtime for self at home daily  3. Continue to be mindful of the guilt/blame/shame trauma \"hammer\" used to ease the emotional pain of losing your father  4. Continue to maintain emotional boundaries with mother and ex boyfriend  5. Continue to schedule consistent down time for yourself for PTSD management   6. Get [employer] membership over the next month  7.  Return to clinic for Dr Daina Thompson in 1 month

## 2023-03-16 ENCOUNTER — OFFICE VISIT (OUTPATIENT)
Dept: PSYCHOLOGY | Age: 43
End: 2023-03-16
Payer: MEDICARE

## 2023-03-16 DIAGNOSIS — F43.10 PTSD (POST-TRAUMATIC STRESS DISORDER): Primary | ICD-10-CM

## 2023-03-16 PROCEDURE — 90834 PSYTX W PT 45 MINUTES: CPT | Performed by: PSYCHOLOGIST

## 2023-03-16 PROCEDURE — 1036F TOBACCO NON-USER: CPT | Performed by: PSYCHOLOGIST

## 2023-03-16 ASSESSMENT — ANXIETY QUESTIONNAIRES
3. WORRYING TOO MUCH ABOUT DIFFERENT THINGS: 2-OVER HALF THE DAYS
GAD7 TOTAL SCORE: 13
4. TROUBLE RELAXING: 2-OVER HALF THE DAYS
7. FEELING AFRAID AS IF SOMETHING AWFUL MIGHT HAPPEN: 3-NEARLY EVERY DAY
1. FEELING NERVOUS, ANXIOUS, OR ON EDGE: 2
2. NOT BEING ABLE TO STOP OR CONTROL WORRYING: 2-OVER HALF THE DAYS
6. BECOMING EASILY ANNOYED OR IRRITABLE: 0-NOT AT ALL
5. BEING SO RESTLESS THAT IT IS HARD TO SIT STILL: 2-OVER HALF THE DAYS

## 2023-03-16 ASSESSMENT — PATIENT HEALTH QUESTIONNAIRE - PHQ9
6. FEELING BAD ABOUT YOURSELF - OR THAT YOU ARE A FAILURE OR HAVE LET YOURSELF OR YOUR FAMILY DOWN: 0
1. LITTLE INTEREST OR PLEASURE IN DOING THINGS: 1
SUM OF ALL RESPONSES TO PHQ QUESTIONS 1-9: 9
SUM OF ALL RESPONSES TO PHQ QUESTIONS 1-9: 9
9. THOUGHTS THAT YOU WOULD BE BETTER OFF DEAD, OR OF HURTING YOURSELF: 0
2. FEELING DOWN, DEPRESSED OR HOPELESS: 2
7. TROUBLE CONCENTRATING ON THINGS, SUCH AS READING THE NEWSPAPER OR WATCHING TELEVISION: 1
SUM OF ALL RESPONSES TO PHQ9 QUESTIONS 1 & 2: 3
5. POOR APPETITE OR OVEREATING: 1
SUM OF ALL RESPONSES TO PHQ QUESTIONS 1-9: 9
3. TROUBLE FALLING OR STAYING ASLEEP: 1
4. FEELING TIRED OR HAVING LITTLE ENERGY: 1
SUM OF ALL RESPONSES TO PHQ QUESTIONS 1-9: 9
10. IF YOU CHECKED OFF ANY PROBLEMS, HOW DIFFICULT HAVE THESE PROBLEMS MADE IT FOR YOU TO DO YOUR WORK, TAKE CARE OF THINGS AT HOME, OR GET ALONG WITH OTHER PEOPLE: 1
8. MOVING OR SPEAKING SO SLOWLY THAT OTHER PEOPLE COULD HAVE NOTICED. OR THE OPPOSITE, BEING SO FIGETY OR RESTLESS THAT YOU HAVE BEEN MOVING AROUND A LOT MORE THAN USUAL: 2

## 2023-03-16 NOTE — PROGRESS NOTES
Behavioral Health Consultation Follow-up  Theresa Reed PsyD  Psychologist  3/16/2023  2:36 PM      Time spent with Patient: 45 minutes  This is patient's  17 th   [facility] appointment.    Reason for Consult:  PTSD  Referring Provider: Jess Roa MD  [address]  [address] [place] [address]    Feedback given to PCP.    S:    Last appt: 2/13. More focus on normal grief reaction in regards to the state of her dating relationship with partner since he moved in with his mother. Feels she is \"trapped\" in that she is waiting for the other shoe to fall in terms of partner \"leaving\" her. This sparked deeper discussion about her own freedom in ending the relationship but the truth is she is reliant on partner in some financial aspects in terms of him owning the house. This led to \"I was tricked\" and we discussed how trauma can confuse the boundaries around who is responsible for what and often blame and shame can be used to \"take back the power\" which we agreed was an \"illusion\" so we focused on true ways she has freedom in her home which includes the consideration of moving partner's things out of eye sight so she doesn't get reminded or distracted why she tries to move forward to create a life without him. Agreed having mother help move these things over the next month would ease the pain of this task.     O:  MSE:    Appearance    alert, cooperative  Appetite abnormal: poor  Sleep disturbance Yes  Fatigue Yes  Loss of pleasure Yes  Impulsive behavior No  Speech    normal rate, normal volume, and well articulated  Mood    stable, managing  Affect    normal affect  Thought Content    intact  Thought Process    linear, goal directed, and coherent  Associations    logical connections  Insight    Good  Judgment    Intact  Orientation    oriented to person, place, time, and general circumstances  Memory    recent and remote memory intact  Attention/Concentration    intact  Morbid ideation No  Suicide  Assessment    no suicidal ideation      History:    Medications:   Current Outpatient Medications   Medication Sig Dispense Refill    fluticasone (FLONASE) 50 MCG/ACT nasal spray 2 sprays by Nasal route in the morning. 16 g 5    azelastine (ASTELIN) 0.1 % nasal spray 1 spray by Nasal route in the morning and 1 spray before bedtime. Use in each nostril as directed. 30 mL 6    FLUoxetine (PROZAC) 40 MG capsule TAKE TWO CAPSULES BY MOUTH DAILY 60 capsule 2    SUMAtriptan (IMITREX) 50 MG tablet Take 1 tablet by mouth once as needed for Migraine 9 tablet 1    ondansetron (ZOFRAN) 4 MG tablet TAKE 1 TABLET EVERY 8 HOURS AS NEEDED FOR NAUSEA AND VOMITING 30 tablet 3    meclizine (ANTIVERT) 12.5 MG tablet TAKE 1 TABLET  3 TIMES DAILY AS NEEDED FOR DIZZINESS 30 tablet 0    albuterol sulfate  (90 Base) MCG/ACT inhaler INHALE 2 PUFFS EVERY 6 HOURS AS NEEDED FOR WHEEZING OR SHORTNESS OF BREATH 1 each 5    albuterol (PROVENTIL) (2.5 MG/3ML) 0.083% nebulizer solution Take 3 mLs by nebulization every 4 hours as needed for Wheezing or Shortness of Breath 120 each 3    fluticasone-salmeterol (ADVAIR) 500-50 MCG/ACT AEPB diskus inhaler Inhale 1 puff into the lungs every 12 hours 60 each 3     No current facility-administered medications for this visit. Social History:   Social History     Socioeconomic History    Marital status: Life Partner     Spouse name: Not on file    Number of children: 0    Years of education: 16    Highest education level:  Bachelor's degree (e.g., BA, AB, BS)   Occupational History    Not on file   Tobacco Use    Smoking status: Never    Smokeless tobacco: Never   Vaping Use    Vaping Use: Never used   Substance and Sexual Activity    Alcohol use: Yes     Comment: occasional.  Drank heavily when she was younger    Drug use: Yes     Types: Marijuana Kenard Snooks)     Comment: Edibles and THC pen    Sexual activity: Yes     Partners: Male   Other Topics Concern    Not on file   Social History Narrative Patient does hold a degree in psychology. She has been with the current person that she is with for approximately 20 years, no unstable housing at this time but does have a history of homelessness. Patient has no children and is not currently working. No guns at home, no  service for the patient. Patient has been in a legal luciano with her aunt over monies her father was supposed to left her, with this luciano having lasted approximately 4 years. Social Determinants of Health     Financial Resource Strain: Not on file   Food Insecurity: Not on file   Transportation Needs: Not on file   Physical Activity: Not on file   Stress: Not on file   Social Connections: Not on file   Intimate Partner Violence: Not on file   Housing Stability: Not on file       TOBACCO:   reports that she has never smoked. She has never used smokeless tobacco.  ETOH:   reports current alcohol use. Family History:   Family History   Problem Relation Age of Onset    Other Mother         autoimmune dz    Other Father         cirrhosis    Substance Abuse Father     Cancer Father     Breast Cancer Maternal Grandmother         On hospice in patient's home    Suicide Other         Uncle killed himself when patient age 4    Suicide Cousin         Cousin killed himself in 2017       A:    See S: above    PHQ Scores 3/16/2023 2/13/2023 4/19/2022 9/22/2021 6/10/2021 12/13/2019 10/22/2019   PHQ2 Score 3 4 6 6 6 6 6   PHQ9 Score 9 19 27 24 27 24 24     Interpretation of Total Score Depression Severity: 1-4 = Minimal depression, 5-9 = Mild depression, 10-14 = Moderate depression, 15-19 = Moderately severe depression, 20-27 = Severe depression     BONITA 7 SCORE 3/16/2023 2/13/2023 4/19/2022 9/22/2021 6/10/2021 12/13/2019 8/13/2019   BONITA-7 Total Score - - 21 - - - -   BONITA-7 Total Score 13 14 - 21 21 21 21     Interpretation of BONITA-7 score: 5-9 = mild anxiety, 10-14 = moderate anxiety, 15+ = severe anxiety.  Recommend referral to behavioral health for scores 10 or greater. Safety: Denied any si/hi risk, intent, or plan     Diagnosis:    PTSD      Diagnosis Date    Asthma     Back pain     Bilateral ovarian cysts     Bipolar 1 disorder (HCC)     Cancer (HCC)     adenoca in situ cervix    DDD (degenerative disc disease), lumbar     Headache(784.0)     IBS (irritable bowel syndrome)     Multiple benign melanocytic nevi 4/10/2019    OCD (obsessive compulsive disorder)     PTSD (post-traumatic stress disorder)     Vertigo      Problems with primary support group, Problems related to the social environment, and Other psychosocial and environmental problems    Plan:  Pt interventions:    Practiced assertive communication, Discussed self-care (sleep, nutrition, rewarding activities, social support, exercise), Motivational Interviewing to determine importance and readiness for change, Discussed potential barriers to change, Supportive techniques, and CBT to target assertively moving partner's things out of way to support grief process. Pt Behavioral Change Plan:    Continue to work with new  with SSDI as needed  2. Continue to scheduled regular downtime for self at home daily  3. Continue to be mindful of the guilt/blame/shame trauma \"hammer\" used to ease the emotional pain of the possible ending of your relationship: shift \"I was tricked\" to \"I was lied too\"  4. Continue to maintain emotional boundaries with mother and ex boyfriend  5. Continue to schedule consistent down time for yourself for PTSD management   6.  Return to clinic for Dr Ulysses Couch in 1 month

## 2023-03-16 NOTE — PATIENT INSTRUCTIONS
Continue to work with new  with SSDI as needed  2. Continue to scheduled regular downtime for self at home daily  3. Continue to be mindful of the guilt/blame/shame trauma \"hammer\" used to ease the emotional pain of the possible ending of your relationship: shift \"I was tricked\" to \"I was lied too\"  4. Continue to maintain emotional boundaries with mother and ex boyfriend  5. Continue to schedule consistent down time for yourself for PTSD management   6.  Return to clinic for Dr Francesco Muñiz in 1 month

## 2023-03-23 ENCOUNTER — OFFICE VISIT (OUTPATIENT)
Dept: PRIMARY CARE CLINIC | Age: 43
End: 2023-03-23

## 2023-03-23 VITALS
SYSTOLIC BLOOD PRESSURE: 124 MMHG | WEIGHT: 224.8 LBS | DIASTOLIC BLOOD PRESSURE: 79 MMHG | HEART RATE: 89 BPM | BODY MASS INDEX: 37.41 KG/M2

## 2023-03-23 DIAGNOSIS — M43.6 NECK STIFFNESS: ICD-10-CM

## 2023-03-23 DIAGNOSIS — M54.9 UPPER BACK PAIN ON RIGHT SIDE: Primary | ICD-10-CM

## 2023-03-23 DIAGNOSIS — S29.012A STRAIN OF RIGHT RHOMBOID MUSCLE: ICD-10-CM

## 2023-03-23 ASSESSMENT — ENCOUNTER SYMPTOMS
CONSTIPATION: 0
SHORTNESS OF BREATH: 0
DIARRHEA: 0
ABDOMINAL PAIN: 0
EYE PAIN: 0
COUGH: 0
BACK PAIN: 1

## 2023-03-23 NOTE — PROGRESS NOTES
Chief Complaint   Patient presents with    Shoulder Pain     Right side no injury started exercising and was getting pain     Neck Pain     Right side no injury started exercising and was getting pain          ASSESSMENT/PLAN:  1. Upper back pain on right side  2. Neck stiffness  3. Strain of right rhomboid muscle  Referral to [facility] for massage therapy to help with trapezoid and rhomboid strain  Follow up in 6 weeks to gauge improvement   - [facility]       HPI:  Christian Sol is a 40 y.o. (: 1980) here today for right sided upper back pain. Has been going on for awhile, bothers her with certain extension and abduction while doing pilates. No injury. No numbness in or weakness in hand or extremity. She states one of the massages she received helped her pain and range of motion. The pain in the right side by shoulder blade was not as severe or intense. She would like to continue the massages. Review of Systems   Constitutional:  Negative for appetite change, chills, fatigue and fever. HENT:  Negative for congestion. Eyes:  Negative for pain and visual disturbance. Respiratory:  Negative for cough and shortness of breath. Cardiovascular:  Negative for chest pain and palpitations. Gastrointestinal:  Negative for abdominal pain, constipation and diarrhea. Genitourinary:  Negative for difficulty urinating. Musculoskeletal:  Positive for back pain, myalgias, neck pain and neck stiffness. Negative for arthralgias. Skin:  Negative for rash and wound. Neurological:  Negative for dizziness, weakness, light-headedness and headaches. Hematological:  Does not bruise/bleed easily. Psychiatric/Behavioral:  Negative for behavioral problems.       Past Medical History:   Diagnosis Date    Asthma     Back pain     Bilateral ovarian cysts     Bipolar 1 disorder ([facility] [address]     Cancer (HCC)     adenoca in situ cervix    DDD (degenerative disc disease), lumbar

## 2023-03-24 DIAGNOSIS — J45.40 MODERATE PERSISTENT ASTHMA WITHOUT COMPLICATION: ICD-10-CM

## 2023-03-26 RX ORDER — ALBUTEROL SULFATE 2.5 MG/3ML
SOLUTION RESPIRATORY (INHALATION)
Qty: 360 ML | Refills: 3 | Status: SHIPPED | OUTPATIENT
Start: 2023-03-26

## 2023-04-10 DIAGNOSIS — F60.3 BORDERLINE PERSONALITY DISORDER (HCC): ICD-10-CM

## 2023-04-19 ENCOUNTER — OFFICE VISIT (OUTPATIENT)
Dept: PSYCHOLOGY | Age: 43
End: 2023-04-19
Payer: MEDICARE

## 2023-04-19 DIAGNOSIS — F43.10 PTSD (POST-TRAUMATIC STRESS DISORDER): Primary | ICD-10-CM

## 2023-04-19 PROCEDURE — 1036F TOBACCO NON-USER: CPT | Performed by: PSYCHOLOGIST

## 2023-04-19 PROCEDURE — 90834 PSYTX W PT 45 MINUTES: CPT | Performed by: PSYCHOLOGIST

## 2023-04-19 ASSESSMENT — PATIENT HEALTH QUESTIONNAIRE - PHQ9
10. IF YOU CHECKED OFF ANY PROBLEMS, HOW DIFFICULT HAVE THESE PROBLEMS MADE IT FOR YOU TO DO YOUR WORK, TAKE CARE OF THINGS AT HOME, OR GET ALONG WITH OTHER PEOPLE: 0
7. TROUBLE CONCENTRATING ON THINGS, SUCH AS READING THE NEWSPAPER OR WATCHING TELEVISION: 1
SUM OF ALL RESPONSES TO PHQ QUESTIONS 1-9: 14
6. FEELING BAD ABOUT YOURSELF - OR THAT YOU ARE A FAILURE OR HAVE LET YOURSELF OR YOUR FAMILY DOWN: 0
3. TROUBLE FALLING OR STAYING ASLEEP: 3
2. FEELING DOWN, DEPRESSED OR HOPELESS: 3
4. FEELING TIRED OR HAVING LITTLE ENERGY: 2
1. LITTLE INTEREST OR PLEASURE IN DOING THINGS: 1
9. THOUGHTS THAT YOU WOULD BE BETTER OFF DEAD, OR OF HURTING YOURSELF: 0
SUM OF ALL RESPONSES TO PHQ QUESTIONS 1-9: 14
8. MOVING OR SPEAKING SO SLOWLY THAT OTHER PEOPLE COULD HAVE NOTICED. OR THE OPPOSITE, BEING SO FIGETY OR RESTLESS THAT YOU HAVE BEEN MOVING AROUND A LOT MORE THAN USUAL: 3
SUM OF ALL RESPONSES TO PHQ9 QUESTIONS 1 & 2: 4
5. POOR APPETITE OR OVEREATING: 1

## 2023-04-19 ASSESSMENT — ANXIETY QUESTIONNAIRES
6. BECOMING EASILY ANNOYED OR IRRITABLE: 1-SEVERAL DAYS
GAD7 TOTAL SCORE: 15
7. FEELING AFRAID AS IF SOMETHING AWFUL MIGHT HAPPEN: 3-NEARLY EVERY DAY
3. WORRYING TOO MUCH ABOUT DIFFERENT THINGS: 1-SEVERAL DAYS
1. FEELING NERVOUS, ANXIOUS, OR ON EDGE: 3
2. NOT BEING ABLE TO STOP OR CONTROL WORRYING: 2-OVER HALF THE DAYS
5. BEING SO RESTLESS THAT IT IS HARD TO SIT STILL: 3-NEARLY EVERY DAY
4. TROUBLE RELAXING: 2-OVER HALF THE DAYS

## 2023-04-19 NOTE — PROGRESS NOTES
Multiple benign melanocytic nevi 4/10/2019    OCD (obsessive compulsive disorder)     PTSD (post-traumatic stress disorder)     Vertigo      Problems with primary support group, Problems related to the social environment, and Other psychosocial and environmental problems    Plan:  Pt interventions:    Practiced assertive communication, Trained in strategies for increasing balanced thinking, and Discussed self-care (sleep, nutrition, rewarding activities, social support, exercise)    Pt Behavioral Change Plan:    Continue to work with new  with SSDI as needed  2. Continue to scheduled regular downtime for self at home daily  3. Continue to be mindful of the guilt/blame/shame trauma \"hammer\" used to ease the emotional pain of the possible ending of your relationship: shift \"I was tricked\" to \"I was lied too\"  4. Continue to maintain emotional boundaries with mother and ex boyfriend  5. Continue to schedule consistent down time for yourself for PTSD management   6.  Return to clinic for Dr Benjamin Lopez in 1 month

## 2023-04-19 NOTE — PATIENT INSTRUCTIONS
Continue to work with new  with SSDI as needed  2. Continue to scheduled regular downtime for self at home daily  3. Continue to be mindful of the guilt/blame/shame trauma \"hammer\" used to ease the emotional pain of the possible ending of your relationship: shift \"I was tricked\" to \"I was lied too\"  4. Continue to maintain emotional boundaries with mother and ex boyfriend  5. Continue to schedule consistent down time for yourself for PTSD management   6.  Return to clinic for Dr Adi Gutierrez in 1 month

## 2023-04-20 RX ORDER — FLUOXETINE HYDROCHLORIDE 40 MG/1
80 CAPSULE ORAL DAILY
Qty: 60 CAPSULE | Refills: 2 | OUTPATIENT
Start: 2023-04-20

## 2023-04-27 ENCOUNTER — HOSPITAL ENCOUNTER (OUTPATIENT)
Dept: PHYSICAL THERAPY | Age: 43
Setting detail: THERAPIES SERIES
Discharge: HOME | End: 2023-04-27
Payer: MEDICARE

## 2023-04-27 PROCEDURE — 97530 THERAPEUTIC ACTIVITIES: CPT | Performed by: CHIROPRACTOR

## 2023-04-27 PROCEDURE — 97161 PT EVAL LOW COMPLEX 20 MIN: CPT | Performed by: CHIROPRACTOR

## 2023-04-27 PROCEDURE — 97035 APP MDLTY 1+ULTRASOUND EA 15: CPT | Performed by: CHIROPRACTOR

## 2023-04-27 NOTE — FLOWSHEET NOTE
Discharge    Electronically signed by: Evens Pruitt  #[id number]    Note: If patient does not return for scheduled/recommended follow up visits, this note will serve as a discharge from care along with the most recent update on progress.
face-to-face)  [] RE-EVAL     PLAN: Cervical stretches , UE Exer  Frequency/Duration:  2 days per week for 6 Weeks:  Interventions:  [x]  Therapeutic exercise including: strength training, ROM, for scapula, core and Upper extremity, including postural re-education. [x]  NMR activation and proprioception for UE, periscapular and RC muscles and Core, including postural re-education. [x]  Manual therapy as indicated for shoulder, scapula, spine and associated soft tissue including: Dry Needling/IASTM, STM, PROM, Gr I-IV mobilizations, manipulation. [x] Modalities as needed that may include: thermal agents, E-stim, Biofeedback, US, iontophoresis as indicated  [x] Patient education on joint protection, postural re-education, activity modification, progression of HEP. [] Aquatic exercise including: strength training, ROM, for scapula, core and Upper extremity, including postural re-education. HEP instruction: Voiced understanding of home exer and posture    GOALS:  Patient stated goal: Less Pain  [] Progressing: [] Met: [] Not Met: [] Adjusted    Therapist goals for Patient:   Short Term Goals: To be achieved in: 2 weeks  1. Independent in HEP and progression per patient tolerance, in order to prevent re-injury. [] Progressing: [] Met: [] Not Met: [] Adjusted  2. Patient will have a decrease in pain to facilitate improvement in movement, function, and ADLs as indicated by Functional Deficits. [] Progressing: [] Met: [] Not Met: [] Adjusted    Long Term Goals: To be achieved in: 6 weeks  1. Increase UEFI functional outcome score from 42 to >65 to assist with reaching prior level of function. [] Progressing: [] Met: [] Not Met: [] Adjusted  2. Patient will demonstrate increased AROM to WNL to allow for better posture and  for proper joint functioning as indicated by Functional Deficits. [] Progressing: [] Met: [] Not Met: [] Adjusted  3.  Patient will return to usual  activities without increased symptoms

## 2023-05-01 ENCOUNTER — HOSPITAL ENCOUNTER (OUTPATIENT)
Dept: PHYSICAL THERAPY | Age: 43
Setting detail: THERAPIES SERIES
Discharge: HOME | End: 2023-05-01
Payer: MEDICARE

## 2023-05-01 PROCEDURE — 97035 APP MDLTY 1+ULTRASOUND EA 15: CPT

## 2023-05-01 PROCEDURE — 97110 THERAPEUTIC EXERCISES: CPT

## 2023-05-01 NOTE — FLOWSHEET NOTE
[facility], [facility]  [address] [place], [facility]  Phone: [phone]   Fax:     [phone]        Physical Therapy Treatment Note/ Progress Report:       Date:  2023    Patient Name:  Dilan Horn    :  1980  MRN: [id number]    Pertinent Medical History:Additional Pertinent Hx: OA, Asthma, LBP, Bipolar Disorder, IBS, PTSD    Medical/Treatment Diagnosis Information:  Medical Diagnosis: Strain of right rhomboid muscle [S29.012A]  Chronic right shoulder pain [M25.511, G89.29]  Treatment Diagnosis: Pain in R side of neck  and upper back and radiating down R UE    Insurance/Certification information:  PT Insurance Information: [employer] Medicare (yavalue)  Physician Information:  Mayela Reza MD  Plan of care signed (Y/N): Inbox    Date of Patient follow up with Physician:      Progress Report: []  Yes  [x]  No     Date Range for reporting period:  Beginnin2023  Ending:      Progress report due (10 Rx/or 30 days whichever is less):      Recertification due (POC duration/ or 90 days whichever is less):      Visit # POC/Insurance Allowable Auth Needed   2 / 10 10 visits  23 to 23 [x]Yes    []No     Functional Outcomes Measure:      Test: UEFI  Date Assessed Score   23 42           Pain level:  5/10     History of Injury:   Progressively increasing pain in R side of neck, R upper back and radiating down R UE    SUBJECTIVE:    23 mild increased pain/ burning after first Rx x 1 day.         s  OBJECTIVE:   Observation:   Test measurements:      RESTRICTIONS/PRECAUTIONS:     Exercises/Interventions:   Therapeutic Ex (06089)  Min: Resistance/Reps Notes/Cues   UBE    F/B 120 rpm x 2 min         T-slide - Rows                 Ext                  IR/ER Yellow x 10 B  Yellow x 10 B       Yellow x 10 ea R                                  Therapeutic Activity (20695) Min:

## 2023-05-03 ENCOUNTER — HOSPITAL ENCOUNTER (OUTPATIENT)
Dept: PHYSICAL THERAPY | Age: 43
Setting detail: THERAPIES SERIES
Discharge: HOME | End: 2023-05-03
Payer: MEDICARE

## 2023-05-03 PROCEDURE — 97110 THERAPEUTIC EXERCISES: CPT | Performed by: CHIROPRACTOR

## 2023-05-03 PROCEDURE — 97035 APP MDLTY 1+ULTRASOUND EA 15: CPT | Performed by: CHIROPRACTOR

## 2023-05-03 NOTE — FLOWSHEET NOTE
[facility], [facility]  [address] [facility], [facility]  Phone: [phone]   Fax:     [phone]        Physical Therapy Treatment Note/ Progress Report:       Date:  2023    Patient Name:  Lindsey Lee    :  1980  MRN: [id number]    Pertinent Medical History:Additional Pertinent Hx: OA, Asthma, LBP, Bipolar Disorder, IBS, PTSD    Medical/Treatment Diagnosis Information:  Medical Diagnosis: Strain of right rhomboid muscle [S29.012A]  Chronic right shoulder pain [M25.511, G89.29]  Treatment Diagnosis: Pain in R side of neck  and upper back and radiating down R UE    Insurance/Certification information:  PT Insurance Information: [employer] Medicare ([employer])  Physician Information:  Kayli Haq MD  Plan of care signed (Y/N): Inbox    Date of Patient follow up with Physician:      Progress Report: []  Yes  [x]  No     Date Range for reporting period:  Beginnin/3/2023  Ending:      Progress report due (10 Rx/or 30 days whichever is less):      Recertification due (POC duration/ or 90 days whichever is less):      Visit # POC/Insurance Allowable Auth Needed   3 / 10 10 visits  23 to 23 [x]Yes    []No     Functional Outcomes Measure:      Test: UEFI  Date Assessed Score   23 42           Pain level:  4/10     History of Injury:   Progressively increasing pain in R side of neck, R upper back and radiating down R UE    SUBJECTIVE:    23 mild increased pain/ burning after first Rx x 1 day.         s  OBJECTIVE:   Observation:   Test measurements:      RESTRICTIONS/PRECAUTIONS:     Exercises/Interventions:   Therapeutic Ex (37024)  Min: Resistance/Reps Notes/Cues   UBE    F/B 120 rpm x 2 min         T-slide - Rows                 Ext                  IR/ER Red - 2x10     Red - 2x10  Red - 2x10   R                                  Therapeutic Activity (68363) Min:      Reviewed

## 2023-05-04 ENCOUNTER — OFFICE VISIT (OUTPATIENT)
Dept: PRIMARY CARE CLINIC | Age: 43
End: 2023-05-04

## 2023-05-04 VITALS
HEART RATE: 72 BPM | WEIGHT: 220.6 LBS | BODY MASS INDEX: 36.71 KG/M2 | SYSTOLIC BLOOD PRESSURE: 102 MMHG | DIASTOLIC BLOOD PRESSURE: 71 MMHG

## 2023-05-04 DIAGNOSIS — S29.012A STRAIN OF RIGHT RHOMBOID MUSCLE: ICD-10-CM

## 2023-05-04 DIAGNOSIS — J45.40 MODERATE PERSISTENT ASTHMA WITHOUT COMPLICATION: Primary | ICD-10-CM

## 2023-05-04 RX ORDER — ALBUTEROL SULFATE 90 UG/1
AEROSOL, METERED RESPIRATORY (INHALATION)
Qty: 1 EACH | Refills: 5 | Status: SHIPPED | OUTPATIENT
Start: 2023-05-04

## 2023-05-04 RX ORDER — MONTELUKAST SODIUM 10 MG/1
10 TABLET ORAL NIGHTLY
Qty: 30 TABLET | Refills: 3 | Status: SHIPPED | OUTPATIENT
Start: 2023-05-04

## 2023-05-04 ASSESSMENT — ENCOUNTER SYMPTOMS
EYE PAIN: 0
COUGH: 0
BACK PAIN: 1
ABDOMINAL PAIN: 0
DIARRHEA: 0
SHORTNESS OF BREATH: 0
CONSTIPATION: 0

## 2023-05-04 NOTE — PROGRESS NOTES
Chief Complaint   Patient presents with    Medication Refill     Needs a new nebulizer machine. Other     Would like tested house could have had asbestos in house     Breathing Problem           Follow-up     Shoulder pain          ASSESSMENT/PLAN:  1. Moderate persistent asthma without complication  Refill Singulair and albuterol  Needs new nebulizer  Follow up as needed  controlled  - montelukast (SINGULAIR) 10 MG tablet; Take 1 tablet by mouth nightly  Dispense: 30 tablet; Refill: 3  - DME Order for Nebulizer as OP  - albuterol sulfate HFA (PROVENTIL;VENTOLIN;PROAIR) 108 (90 Base) MCG/ACT inhaler; INHALE 2 PUFFS EVERY 6 HOURS AS NEEDED FOR WHEEZING OR SHORTNESS OF BREATH  Dispense: 1 each; Refill: 5    2. Strain of right rhomboid muscle  Continue PT  Reviewed PT notes  Will continue accordingly         HPI:  Mansoor Lane is a 40 y.o. (: 1980) here today for refills of asthma meds and to check on rhomboid strain. Asthma is controlled per patient. On alb PRN and montelukast daily  Only uses neb when she has been outside for a long time or if she is having a hard time breathing and her inhaler is elsewhere ie in the car or something. She also uses it when she is sick. PT is going well. Notes improvement over time. No other concerns. Review of Systems   Constitutional:  Negative for appetite change, chills, fatigue and fever. HENT:  Negative for congestion. Eyes:  Negative for pain and visual disturbance. Respiratory:  Negative for cough and shortness of breath. Cardiovascular:  Negative for chest pain and palpitations. Gastrointestinal:  Negative for abdominal pain, constipation and diarrhea. Genitourinary:  Negative for difficulty urinating. Musculoskeletal:  Positive for back pain and myalgias. Negative for arthralgias. Skin:  Negative for rash and wound. Neurological:  Negative for dizziness, weakness, light-headedness and headaches.    Hematological:  Does not

## 2023-05-08 ENCOUNTER — HOSPITAL ENCOUNTER (OUTPATIENT)
Dept: PHYSICAL THERAPY | Age: 43
Setting detail: THERAPIES SERIES
Discharge: HOME | End: 2023-05-08
Payer: MEDICARE

## 2023-05-08 PROCEDURE — 97110 THERAPEUTIC EXERCISES: CPT

## 2023-05-08 PROCEDURE — 97035 APP MDLTY 1+ULTRASOUND EA 15: CPT

## 2023-05-08 NOTE — FLOWSHEET NOTE
[facility], [facility]  [address] [facility], [facility]  Phone: [phone]   Fax:     [phone]        Physical Therapy Treatment Note/ Progress Report:       Date:  2023    Patient Name:  Clarita Winters    :  1980  MRN: [id number]    Pertinent Medical History:Additional Pertinent Hx: OA, Asthma, LBP, Bipolar Disorder, IBS, PTSD    Medical/Treatment Diagnosis Information:  Medical Diagnosis: Strain of right rhomboid muscle [S29.012A]  Chronic right shoulder pain [M25.511, G89.29]  Treatment Diagnosis: Pain in R side of neck  and upper back and radiating down R UE    Insurance/Certification information:  PT Insurance Information: [employer] Medicare ([employer])  Physician Information:  Vita Gutierrez MD  Plan of care signed (Y/N): Inbox    Date of Patient follow up with Physician:      Progress Report: []  Yes  [x]  No     Date Range for reporting period:  Beginnin2023  Ending:      Progress report due (10 Rx/or 30 days whichever is less):      Recertification due (POC duration/ or 90 days whichever is less):      Visit # POC/Insurance Allowable Auth Needed   4 / 10 10 visits  23 to 23 [x]Yes    []No     Functional Outcomes Measure:      Test: UEFI  Date Assessed Score   23 42           Pain level:  5/10     History of Injury:   Progressively increasing pain in R side of neck, R upper back and radiating down R UE    SUBJECTIVE:    23 mild increased pain/ burning after first Rx x 1 day.   23 consistent burning / stabbing R scapula and numbness R UE to middle/ ring finger intermittent. . Reports improvement in postural awareness. Able to do yard work a little easier.            OBJECTIVE:   Observation:   Test measurements:      RESTRICTIONS/PRECAUTIONS:     Exercises/Interventions:   Therapeutic Ex (95877)  Min: Resistance/Reps Notes/Cues   UBE    F/B 120 rpm x 2 min

## 2023-05-10 ENCOUNTER — HOSPITAL ENCOUNTER (OUTPATIENT)
Dept: PHYSICAL THERAPY | Age: 43
Setting detail: THERAPIES SERIES
Discharge: HOME | End: 2023-05-10
Payer: MEDICARE

## 2023-05-10 PROCEDURE — 97035 APP MDLTY 1+ULTRASOUND EA 15: CPT

## 2023-05-10 PROCEDURE — 97530 THERAPEUTIC ACTIVITIES: CPT

## 2023-05-10 NOTE — FLOWSHEET NOTE
minutes face-to-face)  [] EVAL (HIGH) 07655 (typically 45 minutes face-to-face)  [] RE-EVAL     [x] DV(51346) x     [] Dry needle 1 or 2 Muscles (67525)  [] NMR (38738) x     [] Dry needle 3+ Muscles (79310)  [] Manual (56213) x     [x] Ultrasound (84041) x  [] TA (77656) x     [] Mech Traction (41268)  [] ES(attended) (56666)     [] ES (un) (68019):   [] Vasopump (87926) [] Ionto (51398)   [] Other:    I    Approval Dates:  CPT Code Units Approved Units Used  Date Updated:                     GOALS:  Patient stated goal: Less Pain  [] Progressing: [] Met: [] Not Met: [] Adjusted     Therapist goals for Patient:   Short Term Goals: To be achieved in: 2 weeks  1. Independent in HEP and progression per patient tolerance, in order to prevent re-injury. [] Progressing: [] Met: [] Not Met: [] Adjusted  2. Patient will have a decrease in pain to facilitate improvement in movement, function, and ADLs as indicated by Functional Deficits. [] Progressing: [] Met: [] Not Met: [] Adjusted     Long Term Goals: To be achieved in: 6 weeks  1. Increase UEFI functional outcome score from 42 to >65 to assist with reaching prior level of function. [] Progressing: [] Met: [] Not Met: [] Adjusted  2. Patient will demonstrate increased AROM to WNL to allow for better posture and  for proper joint functioning as indicated by Functional Deficits. [] Progressing: [] Met: [] Not Met: [] Adjusted  3. Patient will return to usual  activities without increased symptoms or restriction. [] Progressing: [] Met: [] Not Met: [] Adjusted    ASSESSMENT:     5-10-23 discussed pacing activity to prevent exacerbation.      Treatment/Activity Tolerance:  [x] Patient tolerated treatment well [] Patient limited by fatique  [] Patient limited by pain  [] Patient limited by other medical complications  [] Other:     Overall Progression Towards Functional goals/ Treatment Progress Update:  [] Patient is progressing as expected towards functional goals

## 2023-05-16 ENCOUNTER — HOSPITAL ENCOUNTER (OUTPATIENT)
Dept: PHYSICAL THERAPY | Age: 43
Setting detail: THERAPIES SERIES
Discharge: HOME | End: 2023-05-16
Payer: MEDICARE

## 2023-05-16 PROCEDURE — 97110 THERAPEUTIC EXERCISES: CPT | Performed by: CHIROPRACTOR

## 2023-05-16 PROCEDURE — 97035 APP MDLTY 1+ULTRASOUND EA 15: CPT | Performed by: CHIROPRACTOR

## 2023-05-16 NOTE — FLOWSHEET NOTE
14570 (typically 30 minutes face-to-face)  [] EVAL (HIGH) 52388 (typically 45 minutes face-to-face)  [] RE-EVAL     [x] VT(67459) x     [] Dry needle 1 or 2 Muscles (05858)  [] NMR (23264) x     [] Dry needle 3+ Muscles (10560)  [] Manual (75723) x     [x] Ultrasound (57628) x  [] TA (42334) x     [] Mech Traction (45665)  [] ES(attended) (77843)     [] ES (un) (35802):   [] Vasopump (98531) [] Ionto (21073)   [] Other:    I    Approval Dates:  CPT Code Units Approved Units Used  Date Updated:                     GOALS:  Patient stated goal: Less Pain  [] Progressing: [] Met: [] Not Met: [] Adjusted     Therapist goals for Patient:   Short Term Goals: To be achieved in: 2 weeks  1. Independent in HEP and progression per patient tolerance, in order to prevent re-injury. [] Progressing: [] Met: [] Not Met: [] Adjusted  2. Patient will have a decrease in pain to facilitate improvement in movement, function, and ADLs as indicated by Functional Deficits. [] Progressing: [] Met: [] Not Met: [] Adjusted     Long Term Goals: To be achieved in: 6 weeks  1. Increase UEFI functional outcome score from 42 to >65 to assist with reaching prior level of function. [] Progressing: [] Met: [] Not Met: [] Adjusted  2. Patient will demonstrate increased AROM to WNL to allow for better posture and  for proper joint functioning as indicated by Functional Deficits. [] Progressing: [] Met: [] Not Met: [] Adjusted  3. Patient will return to usual  activities without increased symptoms or restriction. [] Progressing: [] Met: [] Not Met: [] Adjusted    ASSESSMENT:     5-10-23 discussed pacing activity to prevent exacerbation.      Treatment/Activity Tolerance:  [x] Patient tolerated treatment well [] Patient limited by fatique  [] Patient limited by pain  [] Patient limited by other medical complications  [] Other:     Overall Progression Towards Functional goals/ Treatment Progress Update:  [] Patient is progressing as expected

## 2023-05-17 ENCOUNTER — OFFICE VISIT (OUTPATIENT)
Dept: PSYCHOLOGY | Age: 43
End: 2023-05-17
Payer: MEDICARE

## 2023-05-17 DIAGNOSIS — F43.10 PTSD (POST-TRAUMATIC STRESS DISORDER): Primary | ICD-10-CM

## 2023-05-17 PROCEDURE — 90834 PSYTX W PT 45 MINUTES: CPT | Performed by: PSYCHOLOGIST

## 2023-05-17 PROCEDURE — 1036F TOBACCO NON-USER: CPT | Performed by: PSYCHOLOGIST

## 2023-05-17 ASSESSMENT — PATIENT HEALTH QUESTIONNAIRE - PHQ9
5. POOR APPETITE OR OVEREATING: 1
9. THOUGHTS THAT YOU WOULD BE BETTER OFF DEAD, OR OF HURTING YOURSELF: 0
8. MOVING OR SPEAKING SO SLOWLY THAT OTHER PEOPLE COULD HAVE NOTICED. OR THE OPPOSITE, BEING SO FIGETY OR RESTLESS THAT YOU HAVE BEEN MOVING AROUND A LOT MORE THAN USUAL: 2
SUM OF ALL RESPONSES TO PHQ QUESTIONS 1-9: 13
SUM OF ALL RESPONSES TO PHQ QUESTIONS 1-9: 13
3. TROUBLE FALLING OR STAYING ASLEEP: 3
SUM OF ALL RESPONSES TO PHQ9 QUESTIONS 1 & 2: 4
2. FEELING DOWN, DEPRESSED OR HOPELESS: 2
SUM OF ALL RESPONSES TO PHQ QUESTIONS 1-9: 13
6. FEELING BAD ABOUT YOURSELF - OR THAT YOU ARE A FAILURE OR HAVE LET YOURSELF OR YOUR FAMILY DOWN: 0
10. IF YOU CHECKED OFF ANY PROBLEMS, HOW DIFFICULT HAVE THESE PROBLEMS MADE IT FOR YOU TO DO YOUR WORK, TAKE CARE OF THINGS AT HOME, OR GET ALONG WITH OTHER PEOPLE: 1
7. TROUBLE CONCENTRATING ON THINGS, SUCH AS READING THE NEWSPAPER OR WATCHING TELEVISION: 1
4. FEELING TIRED OR HAVING LITTLE ENERGY: 2
1. LITTLE INTEREST OR PLEASURE IN DOING THINGS: 2
SUM OF ALL RESPONSES TO PHQ QUESTIONS 1-9: 13

## 2023-05-17 ASSESSMENT — ANXIETY QUESTIONNAIRES
4. TROUBLE RELAXING: 3-NEARLY EVERY DAY
1. FEELING NERVOUS, ANXIOUS, OR ON EDGE: 3
GAD7 TOTAL SCORE: 17
3. WORRYING TOO MUCH ABOUT DIFFERENT THINGS: 2-OVER HALF THE DAYS
5. BEING SO RESTLESS THAT IT IS HARD TO SIT STILL: 3-NEARLY EVERY DAY
6. BECOMING EASILY ANNOYED OR IRRITABLE: 0-NOT AT ALL
7. FEELING AFRAID AS IF SOMETHING AWFUL MIGHT HAPPEN: 3-NEARLY EVERY DAY
2. NOT BEING ABLE TO STOP OR CONTROL WORRYING: 3-NEARLY EVERY DAY
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
## 2023-05-19 ENCOUNTER — HOSPITAL ENCOUNTER (OUTPATIENT)
Dept: PHYSICAL THERAPY | Age: 43
Setting detail: THERAPIES SERIES
Discharge: HOME | End: 2023-05-19
Payer: MEDICARE

## 2023-05-19 PROCEDURE — 97035 APP MDLTY 1+ULTRASOUND EA 15: CPT | Performed by: CHIROPRACTOR

## 2023-05-19 PROCEDURE — 97110 THERAPEUTIC EXERCISES: CPT | Performed by: CHIROPRACTOR

## 2023-05-19 NOTE — FLOWSHEET NOTE
[facility], [facility]  [address] [facility] [facility] [facility] [place], [facility]  Phone: [phone]   Fax:     [phone]        Physical Therapy Treatment Note/ Progress Report:       Date:  2023    Patient Name:  Nury Aquino    :  1980  MRN: [id number]    Pertinent Medical History:Additional Pertinent Hx: OA, Asthma, LBP, Bipolar Disorder, IBS, PTSD    Medical/Treatment Diagnosis Information:  Medical Diagnosis: Strain of right rhomboid muscle [S29.012A]  Chronic right shoulder pain [M25.511, G89.29]  Treatment Diagnosis: Pain in R side of neck  and upper back and radiating down R UE    Insurance/Certification information:  PT Insurance Information: [employer] Medicare ([employer])  Physician Information:  Amrita Mcnulty MD  Plan of care signed (Y/N): Inbox    Date of Patient follow up with Physician:      Progress Report: []  Yes  [x]  No     Date Range for reporting period:  Beginnin2023  Ending:      Progress report due (10 Rx/or 30 days whichever is less):      Recertification due (POC duration/ or 90 days whichever is less):      Visit # POC/Insurance Allowable Auth Needed   7 / 10 10 visits  23 to 23 [x]Yes    []No     Functional Outcomes Measure:      Test: UEFI  Date Assessed Score   23 42           Pain level:  4/10     History of Injury:   Progressively increasing pain in R side of neck, R upper back and radiating down R UE    SUBJECTIVE:    23 mild increased pain/ burning after first Rx x 1 day.   23 consistent burning / stabbing R scapula and numbness R UE to middle/ ring finger intermittent. . Reports improvement in postural awareness. Able to do yard work a little easier. 5-10-23 more burning today R trap/ scap, no c/o of R UE pain N/t today. 3 hours relief after last Rx until increased activity/ driving in traffic.    R arm feeling a little stronger with ADL's.  23 -

## 2023-05-22 ENCOUNTER — HOSPITAL ENCOUNTER (OUTPATIENT)
Dept: PHYSICAL THERAPY | Age: 43
Setting detail: THERAPIES SERIES
Discharge: HOME | End: 2023-05-22
Payer: MEDICARE

## 2023-05-22 PROCEDURE — 97110 THERAPEUTIC EXERCISES: CPT

## 2023-05-22 PROCEDURE — 97035 APP MDLTY 1+ULTRASOUND EA 15: CPT

## 2023-05-22 NOTE — FLOWSHEET NOTE
[facility], [facility]  [address] [facility] [place], [facility]  Phone: [phone]   Fax:     [phone]        Physical Therapy Treatment Note/ Progress Report:       Date:  2023    Patient Name:  Fabrice Romero    :  1980  MRN: [id number]    Pertinent Medical History:Additional Pertinent Hx: OA, Asthma, LBP, Bipolar Disorder, IBS, PTSD    Medical/Treatment Diagnosis Information:  Medical Diagnosis: Strain of right rhomboid muscle [S29.012A]  Chronic right shoulder pain [M25.511, G89.29]  Treatment Diagnosis: Pain in R side of neck  and upper back and radiating down R UE    Insurance/Certification information:  PT Insurance Information: Hum[employer] Medicare ([employer])  Physician Information:  Iveth Howard MD  Plan of care signed (Y/N): Inbox    Date of Patient follow up with Physician:      Progress Report: []  Yes  [x]  No     Date Range for reporting period:  Beginnin2023  Ending:      Progress report due (10 Rx/or 30 days whichever is less):      Recertification due (POC duration/ or 90 days whichever is less):      Visit # POC/Insurance Allowable Auth Needed   8 / 10 10 visits  23 to 23 [x]Yes    []No     Functional Outcomes Measure:      Test: UEFI  Date Assessed Score   23 42           Pain level:  3/10     History of Injury:   Progressively increasing pain in R side of neck, R upper back and radiating down R UE    SUBJECTIVE:    23 mild increased pain/ burning after first Rx x 1 day.   23 consistent burning / stabbing R scapula and numbness R UE to middle/ ring finger intermittent. . Reports improvement in postural awareness. Able to do yard work a little easier. 5-10-23 more burning today R trap/ scap, no c/o of R UE pain N/t today. 3 hours relief after last Rx until increased activity/ driving in traffic.    R arm feeling a little stronger with ADL's.  23 -

## 2023-05-24 ENCOUNTER — HOSPITAL ENCOUNTER (OUTPATIENT)
Dept: PHYSICAL THERAPY | Age: 43
Setting detail: THERAPIES SERIES
Discharge: HOME | End: 2023-05-24
Payer: MEDICARE

## 2023-05-24 PROCEDURE — 97035 APP MDLTY 1+ULTRASOUND EA 15: CPT

## 2023-05-24 PROCEDURE — 97110 THERAPEUTIC EXERCISES: CPT

## 2023-05-24 NOTE — FLOWSHEET NOTE
[facility], [facility]  [address] [facility] [place], [facility]  Phone: [phone]   Fax:     [phone]        Physical Therapy Treatment Note/ Progress Report:       Date:  2023    Patient Name:  Gloria Peterson    :  1980  MRN: [id number]    Pertinent Medical History:Additional Pertinent Hx: OA, Asthma, LBP, Bipolar Disorder, IBS, PTSD    Medical/Treatment Diagnosis Information:  Medical Diagnosis: Strain of right rhomboid muscle [S29.012A]  Chronic right shoulder pain [M25.511, G89.29]  Treatment Diagnosis: Pain in R side of neck  and upper back and radiating down R UE    Insurance/Certification information:  PT Insurance Information: Humana Medicare ([employer])  Physician Information:  Jairo Dimas MD  Plan of care signed (Y/N): Inbox    Date of Patient follow up with Physician:      Progress Report: []  Yes  [x]  No     Date Range for reporting period:  Beginnin2023  Ending:      Progress report due (10 Rx/or 30 days whichever is less):      Recertification due (POC duration/ or 90 days whichever is less):      Visit # POC/Insurance Allowable Auth Needed   9 / 10 10 visits  23 to 23 [x]Yes    []No     Functional Outcomes Measure:      Test: UEFI  Date Assessed Score   23 42           Pain level:  3/10     History of Injury:   Progressively increasing pain in R side of neck, R upper back and radiating down R UE    SUBJECTIVE:    23 mild increased pain/ burning after first Rx x 1 day.   23 consistent burning / stabbing R scapula and numbness R UE to middle/ ring finger intermittent. . Reports improvement in postural awareness. Able to do yard work a little easier. 5-10-23 more burning today R trap/ scap, no c/o of R UE pain N/t today. 3 hours relief after last Rx until increased activity/ driving in traffic.    R arm feeling a little stronger with ADL's.  23 -

## 2023-06-06 ENCOUNTER — APPOINTMENT (OUTPATIENT)
Dept: PHYSICAL THERAPY | Age: 43
End: 2023-06-06
Payer: MEDICARE

## 2023-06-07 ENCOUNTER — HOSPITAL ENCOUNTER (OUTPATIENT)
Dept: PHYSICAL THERAPY | Age: 43
Setting detail: THERAPIES SERIES
Discharge: HOME | End: 2023-06-07
Payer: MEDICARE

## 2023-06-07 PROCEDURE — 97035 APP MDLTY 1+ULTRASOUND EA 15: CPT | Performed by: CHIROPRACTOR

## 2023-06-07 PROCEDURE — 97110 THERAPEUTIC EXERCISES: CPT | Performed by: CHIROPRACTOR

## 2023-06-07 NOTE — FLOWSHEET NOTE
activity. 6/7/23- Has improved with ROM / strength, but still with intermittent N&T in R UT      Treatment/Activity Tolerance:  [x] Patient tolerated treatment well [] Patient limited by fatique  [] Patient limited by pain  [] Patient limited by other medical complications  [] Other:     Overall Progression Towards Functional goals/ Treatment Progress Update:  [x] Patient is progressing as expected towards functional goals listed. [] Progression is slowed due to complexities/Impairments listed. [] Progression has been slowed due to co-morbidities. [] Plan just implemented, too soon to assess goals progression <30days   [] Goals require adjustment due to lack of progress  [] Patient is not progressing as expected and requires additional follow up with physician  [] Other    Prognosis for POC: [x] Good [] Fair  [] Poor    Patient requires continued skilled intervention: [] Yes  [x] No      PLAN: DC to HEP  [] Continue per plan of care [] Alter current plan (see comments)  [] Plan of care initiated [] Hold pending MD visit [x] Discharge    Electronically signed by: Fabiano MCLEAN#[id number]    Note: If patient does not return for scheduled/recommended follow up visits, this note will serve as a discharge from care along with the most recent update on progress.

## 2023-07-06 ENCOUNTER — TELEMEDICINE (OUTPATIENT)
Dept: PSYCHOLOGY | Age: 43
End: 2023-07-06
Payer: MEDICARE

## 2023-07-06 DIAGNOSIS — F43.10 PTSD (POST-TRAUMATIC STRESS DISORDER): Primary | ICD-10-CM

## 2023-07-06 PROCEDURE — 90832 PSYTX W PT 30 MINUTES: CPT | Performed by: PSYCHOLOGIST
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
## 2023-08-10 ENCOUNTER — OFFICE VISIT (OUTPATIENT)
Dept: PSYCHOLOGY | Age: 43
End: 2023-08-10
Payer: MEDICARE

## 2023-08-10 DIAGNOSIS — F43.10 PTSD (POST-TRAUMATIC STRESS DISORDER): Primary | ICD-10-CM

## 2023-08-10 PROCEDURE — 90834 PSYTX W PT 45 MINUTES: CPT | Performed by: PSYCHOLOGIST

## 2023-08-10 PROCEDURE — 1036F TOBACCO NON-USER: CPT | Performed by: PSYCHOLOGIST

## 2023-08-10 ASSESSMENT — PATIENT HEALTH QUESTIONNAIRE - PHQ9
9. THOUGHTS THAT YOU WOULD BE BETTER OFF DEAD, OR OF HURTING YOURSELF: 0
10. IF YOU CHECKED OFF ANY PROBLEMS, HOW DIFFICULT HAVE THESE PROBLEMS MADE IT FOR YOU TO DO YOUR WORK, TAKE CARE OF THINGS AT HOME, OR GET ALONG WITH OTHER PEOPLE: 1
7. TROUBLE CONCENTRATING ON THINGS, SUCH AS READING THE NEWSPAPER OR WATCHING TELEVISION: 1
SUM OF ALL RESPONSES TO PHQ QUESTIONS 1-9: 9
4. FEELING TIRED OR HAVING LITTLE ENERGY: 1
5. POOR APPETITE OR OVEREATING: 2
1. LITTLE INTEREST OR PLEASURE IN DOING THINGS: 1
2. FEELING DOWN, DEPRESSED OR HOPELESS: 1
SUM OF ALL RESPONSES TO PHQ9 QUESTIONS 1 & 2: 2
SUM OF ALL RESPONSES TO PHQ QUESTIONS 1-9: 9
3. TROUBLE FALLING OR STAYING ASLEEP: 1
6. FEELING BAD ABOUT YOURSELF - OR THAT YOU ARE A FAILURE OR HAVE LET YOURSELF OR YOUR FAMILY DOWN: 0
8. MOVING OR SPEAKING SO SLOWLY THAT OTHER PEOPLE COULD HAVE NOTICED. OR THE OPPOSITE, BEING SO FIGETY OR RESTLESS THAT YOU HAVE BEEN MOVING AROUND A LOT MORE THAN USUAL: 2
SUM OF ALL RESPONSES TO PHQ QUESTIONS 1-9: 9
SUM OF ALL RESPONSES TO PHQ QUESTIONS 1-9: 9

## 2023-08-10 ASSESSMENT — ANXIETY QUESTIONNAIRES
2. NOT BEING ABLE TO STOP OR CONTROL WORRYING: 2-OVER HALF THE DAYS
7. FEELING AFRAID AS IF SOMETHING AWFUL MIGHT HAPPEN: 3-NEARLY EVERY DAY
GAD7 TOTAL SCORE: 12
6. BECOMING EASILY ANNOYED OR IRRITABLE: 0-NOT AT ALL
5. BEING SO RESTLESS THAT IT IS HARD TO SIT STILL: 1-SEVERAL DAYS
4. TROUBLE RELAXING: 2-OVER HALF THE DAYS
1. FEELING NERVOUS, ANXIOUS, OR ON EDGE: 3
3. WORRYING TOO MUCH ABOUT DIFFERENT THINGS: 1-SEVERAL DAYS

## 2023-08-10 NOTE — PATIENT INSTRUCTIONS
Continue to work with new  with SSDI as needed  2. Continue to scheduled regular downtime for self at home daily  3. Continue to be mindful of the guilt/blame/shame trauma \"hammer\" used to ease the emotional pain of the possible ending of your relationship  4. Continue to maintain emotional boundaries with mother and ex boyfriend  5. Continue to schedule consistent down time for yourself for PTSD management   6.  Return to clinic for Dr Mayelin Limon in 1 month
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
## 2023-08-27 DIAGNOSIS — J45.40 MODERATE PERSISTENT ASTHMA WITHOUT COMPLICATION: ICD-10-CM

## 2023-08-28 RX ORDER — FLUTICASONE PROPIONATE AND SALMETEROL 500; 50 UG/1; UG/1
POWDER RESPIRATORY (INHALATION)
Qty: 60 EACH | Refills: 0 | Status: SHIPPED | OUTPATIENT
Start: 2023-08-28

## 2023-08-28 NOTE — TELEPHONE ENCOUNTER
Medication:   Requested Prescriptions     Pending Prescriptions Disp Refills    fluticasone-salmeterol (ADVAIR) 500-50 MCG/ACT AEPB diskus inhaler [Pharmacy Med Name: FLUTICASONE-SALMETEROL 500-50]       Sig: INHALE ONE PUFF BY MOUTH TWICE A DAY IN THE MORNING AND EVENING        Last Filled:  04/10/23    Patient Phone Number: [phone] (home)     Last appt: 5/4/2023   Next appt: Visit date not found    Last OARRS:   RX Monitoring 12/10/2019   Attestation -   Periodic Controlled Substance Monitoring No signs of potential drug abuse or diversion identified.

## 2023-09-06 ENCOUNTER — SCHEDULED TELEPHONE ENCOUNTER (OUTPATIENT)
Dept: PSYCHOLOGY | Age: 43
End: 2023-09-06
Payer: MEDICARE

## 2023-09-06 DIAGNOSIS — F43.10 PTSD (POST-TRAUMATIC STRESS DISORDER): Primary | ICD-10-CM

## 2023-09-06 PROCEDURE — 90832 PSYTX W PT 30 MINUTES: CPT | Performed by: PSYCHOLOGIST
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
## 2023-09-06 NOTE — PATIENT INSTRUCTIONS
Join [employer] club to increase social support to decrease depression and ease grief process   2. Continue to work with new  with SSDI as needed  2. Continue to scheduled regular downtime for self at home daily  3. Continue to be mindful of the guilt/blame/shame trauma \"hammer\" used to ease the emotional pain of the possible ending of your relationship  4. Continue to maintain emotional boundaries with mother and ex boyfriend  5. Continue to schedule consistent down time for yourself for PTSD management   6.  Return to clinic for Dr Sahil Calvin in 2 weeks

## 2023-09-09 DIAGNOSIS — J45.40 MODERATE PERSISTENT ASTHMA WITHOUT COMPLICATION: ICD-10-CM

## 2023-09-11 RX ORDER — MONTELUKAST SODIUM 10 MG/1
10 TABLET ORAL
Qty: 30 TABLET | Refills: 3 | Status: SHIPPED | OUTPATIENT
Start: 2023-09-11

## 2023-09-11 NOTE — TELEPHONE ENCOUNTER
Medication:   Requested Prescriptions     Pending Prescriptions Disp Refills    montelukast (SINGULAIR) 10 MG tablet [Pharmacy Med Name: MONTELUKAST SOD 10 MG TABLET] 30 tablet 3     Sig: TAKE ONE TABLET BY MOUTH ONCE NIGHTLY        Last Filled:  5/4/23    Patient Phone Number: [phone] (home)     Last appt: 5/4/2023   Next appt: 9/28/2023    Last OARRS:       12/10/2019     4:24 PM   RX Monitoring   Periodic Controlled Substance Monitoring No signs of potential drug abuse or diversion identified.

## 2023-09-20 ENCOUNTER — OFFICE VISIT (OUTPATIENT)
Dept: PSYCHOLOGY | Age: 43
End: 2023-09-20
Payer: MEDICARE

## 2023-09-20 DIAGNOSIS — F43.10 PTSD (POST-TRAUMATIC STRESS DISORDER): Primary | ICD-10-CM

## 2023-09-20 PROCEDURE — 1036F TOBACCO NON-USER: CPT | Performed by: PSYCHOLOGIST

## 2023-09-20 PROCEDURE — 90832 PSYTX W PT 30 MINUTES: CPT | Performed by: PSYCHOLOGIST
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
## 2023-09-21 NOTE — PATIENT INSTRUCTIONS
Join reading club to increase social support to decrease depression and ease grief process, try to attend at least 1 x before our next appointment   2. Continue to scheduled regular downtime for self at home daily  3. Continue to be mindful of the guilt/blame/shame trauma \"hammer\" used to ease the emotional pain of the possible ending of your relationship  4. Continue to maintain emotional boundaries with mother  5. Continue to maintain ending relationship with Max, keep emotional boundaries as much as possible   6. Continue to schedule consistent down time for yourself for PTSD management   6.  Return to clinic for Dr Lanie Crews in 3-4 weeks

## 2023-09-25 SDOH — ECONOMIC STABILITY: TRANSPORTATION INSECURITY
IN THE PAST 12 MONTHS, HAS LACK OF TRANSPORTATION KEPT YOU FROM MEETINGS, WORK, OR FROM GETTING THINGS NEEDED FOR DAILY LIVING?: YES

## 2023-09-25 SDOH — ECONOMIC STABILITY: HOUSING INSECURITY
IN THE LAST 12 MONTHS, WAS THERE A TIME WHEN YOU DID NOT HAVE A STEADY PLACE TO SLEEP OR SLEPT IN A SHELTER (INCLUDING NOW)?: NO

## 2023-09-25 SDOH — ECONOMIC STABILITY: FOOD INSECURITY: WITHIN THE PAST 12 MONTHS, THE FOOD YOU BOUGHT JUST DIDN'T LAST AND YOU DIDN'T HAVE MONEY TO GET MORE.: OFTEN TRUE

## 2023-09-25 SDOH — ECONOMIC STABILITY: INCOME INSECURITY: HOW HARD IS IT FOR YOU TO PAY FOR THE VERY BASICS LIKE FOOD, HOUSING, MEDICAL CARE, AND HEATING?: VERY HARD

## 2023-09-25 SDOH — ECONOMIC STABILITY: FOOD INSECURITY: WITHIN THE PAST 12 MONTHS, YOU WORRIED THAT YOUR FOOD WOULD RUN OUT BEFORE YOU GOT MONEY TO BUY MORE.: OFTEN TRUE

## 2023-09-28 ENCOUNTER — OFFICE VISIT (OUTPATIENT)
Dept: PRIMARY CARE CLINIC | Age: 43
End: 2023-09-28

## 2023-09-28 VITALS
DIASTOLIC BLOOD PRESSURE: 62 MMHG | HEIGHT: 65 IN | HEART RATE: 79 BPM | TEMPERATURE: 97.7 F | WEIGHT: 204.7 LBS | BODY MASS INDEX: 34.1 KG/M2 | SYSTOLIC BLOOD PRESSURE: 93 MMHG

## 2023-09-28 DIAGNOSIS — F31.9 BIPOLAR AFFECTIVE DISORDER, REMISSION STATUS UNSPECIFIED (HCC): ICD-10-CM

## 2023-09-28 DIAGNOSIS — Z11.59 ENCOUNTER FOR HEPATITIS C SCREENING TEST FOR LOW RISK PATIENT: ICD-10-CM

## 2023-09-28 DIAGNOSIS — R79.9 ABNORMAL FINDING OF BLOOD CHEMISTRY, UNSPECIFIED: ICD-10-CM

## 2023-09-28 DIAGNOSIS — R68.89 HEAT INTOLERANCE: ICD-10-CM

## 2023-09-28 DIAGNOSIS — Z12.31 ENCOUNTER FOR SCREENING MAMMOGRAM FOR MALIGNANT NEOPLASM OF BREAST: Primary | ICD-10-CM

## 2023-09-28 DIAGNOSIS — R20.2 PINS AND NEEDLES SENSATION: ICD-10-CM

## 2023-09-28 DIAGNOSIS — Z11.4 ENCOUNTER FOR SCREENING FOR HUMAN IMMUNODEFICIENCY VIRUS (HIV): ICD-10-CM

## 2023-09-28 DIAGNOSIS — G89.29 OTHER CHRONIC PAIN: ICD-10-CM

## 2023-09-28 DIAGNOSIS — Z00.00 ANNUAL PHYSICAL EXAM: ICD-10-CM

## 2023-09-28 SDOH — ECONOMIC STABILITY: INCOME INSECURITY: HOW HARD IS IT FOR YOU TO PAY FOR THE VERY BASICS LIKE FOOD, HOUSING, MEDICAL CARE, AND HEATING?: NOT HARD AT ALL

## 2023-09-28 SDOH — ECONOMIC STABILITY: FOOD INSECURITY: WITHIN THE PAST 12 MONTHS, YOU WORRIED THAT YOUR FOOD WOULD RUN OUT BEFORE YOU GOT MONEY TO BUY MORE.: NEVER TRUE

## 2023-09-28 SDOH — ECONOMIC STABILITY: FOOD INSECURITY: WITHIN THE PAST 12 MONTHS, THE FOOD YOU BOUGHT JUST DIDN'T LAST AND YOU DIDN'T HAVE MONEY TO GET MORE.: NEVER TRUE

## 2023-09-28 ASSESSMENT — ENCOUNTER SYMPTOMS
BACK PAIN: 1
WHEEZING: 0
SHORTNESS OF BREATH: 0
NAUSEA: 0

## 2023-09-28 NOTE — PROGRESS NOTES
[facility]  Establish care visit   2023    Esdras Casey (:  1980) is a 40 y.o. female, here to establish care. Chief Complaint   Patient presents with    New Patient    Medication Refill    Peripheral Neuropathy     Syptoms           ASSESSMENT/ PLAN  1. Other chronic pain  Uncontrolled, this is new problem. We will check labs to assess for autoimmune etiology, however story does seem to be consistent with fibromyalgia or chronic pain syndrome. May benefit from continued psychotherapy, alternative medicine and/or trial of Cymbalta, Lyrica  - ALVINO Reflex to Antibody Cascade; Future  - Sedimentation Rate; Future  - C-Reactive Protein; Future    2. Heat intolerance  Uncontrolled, this is new problem. We will check for anemia, thyroid dysfunction.  - CBC with Auto Differential; Future  - TSH with Reflex; Future  - Iron and TIBC; Future    3. Pins and needles sensation  Uncontrolled, this is new problem. We will check for anemia, thyroid dysfunction and B12 deficiency. - CBC with Auto Differential; Future  - TSH with Reflex; Future  - Vitamin B12; Future    4. Annual physical exam  Labs ordered for follow-up physical  - Comprehensive Metabolic Panel; Future  - Hemoglobin A1C; Future  - Lipid Panel; Future  - Vitamin D 25 Hydroxy; Future    5. Encounter for screening mammogram for malignant neoplasm of breast  - IAIN DIGITAL SCREEN W OR WO CAD BILATERAL; Future    6. Encounter for hepatitis C screening test for low risk patient  - Hepatitis C Antibody; Future    7. Encounter for screening for human immunodeficiency virus (HIV)  - HIV Screen; Future    Return if symptoms worsen or fail to improve. HPI  Patient presents today to establish care. Notes that she has been dealing with lifelong concerns regarding pain, heat intolerance and pins and needle sensation. She brought a typed narrative of her symptoms since birth which is scanned into the media tab.   Notes that she has been

## 2023-09-29 LAB
25(OH)D3 SERPL-MCNC: 21.6 NG/ML
ALBUMIN SERPL-MCNC: 4.7 G/DL (ref 3.4–5)
ALBUMIN/GLOB SERPL: 1.8 {RATIO} (ref 1.1–2.2)
ALP SERPL-CCNC: 71 U/L (ref 40–129)
ALT SERPL-CCNC: 28 U/L (ref 10–40)
ANA SER QL IA: NEGATIVE
ANION GAP SERPL CALCULATED.3IONS-SCNC: 12 MMOL/L (ref 3–16)
AST SERPL-CCNC: 20 U/L (ref 15–37)
BASOPHILS # BLD: 0.1 K/UL (ref 0–0.2)
BASOPHILS NFR BLD: 0.9 %
BILIRUB SERPL-MCNC: 0.3 MG/DL (ref 0–1)
BUN SERPL-MCNC: 8 MG/DL (ref 7–20)
CALCIUM SERPL-MCNC: 9.4 MG/DL (ref 8.3–10.6)
CHLORIDE SERPL-SCNC: 106 MMOL/L (ref 99–110)
CHOLEST SERPL-MCNC: 205 MG/DL (ref 0–199)
CO2 SERPL-SCNC: 24 MMOL/L (ref 21–32)
CREAT SERPL-MCNC: 0.8 MG/DL (ref 0.6–1.1)
CRP SERPL-MCNC: 8.1 MG/L (ref 0–5.1)
DEPRECATED RDW RBC AUTO: 15.1 % (ref 12.4–15.4)
EOSINOPHIL # BLD: 0.8 K/UL (ref 0–0.6)
EOSINOPHIL NFR BLD: 8.6 %
ERYTHROCYTE [SEDIMENTATION RATE] IN BLOOD BY WESTERGREN METHOD: 23 MM/HR (ref 0–20)
EST. AVERAGE GLUCOSE BLD GHB EST-MCNC: 122.6 MG/DL
GFR SERPLBLD CREATININE-BSD FMLA CKD-EPI: >60 ML/MIN/{1.73_M2}
GLUCOSE SERPL-MCNC: 98 MG/DL (ref 70–99)
HBA1C MFR BLD: 5.9 %
HCT VFR BLD AUTO: 40.9 % (ref 36–48)
HCV AB SERPL QL IA: NORMAL
HDLC SERPL-MCNC: 44 MG/DL (ref 40–60)
HGB BLD-MCNC: 13.9 G/DL (ref 12–16)
HIV 1+2 AB+HIV1 P24 AG SERPL QL IA: NORMAL
HIV 2 AB SERPL QL IA: NORMAL
HIV1 AB SERPL QL IA: NORMAL
HIV1 P24 AG SERPL QL IA: NORMAL
IRON SATN MFR SERPL: 27 % (ref 15–50)
IRON SERPL-MCNC: 67 UG/DL (ref 37–145)
LDLC SERPL CALC-MCNC: 133 MG/DL
LYMPHOCYTES # BLD: 3.1 K/UL (ref 1–5.1)
LYMPHOCYTES NFR BLD: 33.8 %
MCH RBC QN AUTO: 29.8 PG (ref 26–34)
MCHC RBC AUTO-ENTMCNC: 34 G/DL (ref 31–36)
MCV RBC AUTO: 87.5 FL (ref 80–100)
MONOCYTES # BLD: 0.4 K/UL (ref 0–1.3)
MONOCYTES NFR BLD: 3.9 %
NEUTROPHILS # BLD: 4.9 K/UL (ref 1.7–7.7)
NEUTROPHILS NFR BLD: 52.8 %
PLATELET # BLD AUTO: 222 K/UL (ref 135–450)
PMV BLD AUTO: 9.9 FL (ref 5–10.5)
POTASSIUM SERPL-SCNC: 4.8 MMOL/L (ref 3.5–5.1)
PROT SERPL-MCNC: 7.3 G/DL (ref 6.4–8.2)
RBC # BLD AUTO: 4.68 M/UL (ref 4–5.2)
SODIUM SERPL-SCNC: 142 MMOL/L (ref 136–145)
TIBC SERPL-MCNC: 248 UG/DL (ref 260–445)
TRIGL SERPL-MCNC: 142 MG/DL (ref 0–150)
TSH SERPL DL<=0.005 MIU/L-ACNC: 1.68 UIU/ML (ref 0.27–4.2)
VIT B12 SERPL-MCNC: 438 PG/ML (ref 211–911)
VLDLC SERPL CALC-MCNC: 28 MG/DL
WBC # BLD AUTO: 9.3 K/UL (ref 4–11)

## 2023-10-11 ENCOUNTER — SCHEDULED TELEPHONE ENCOUNTER (OUTPATIENT)
Dept: PSYCHOLOGY | Age: 43
End: 2023-10-11

## 2023-10-11 DIAGNOSIS — F43.10 PTSD (POST-TRAUMATIC STRESS DISORDER): Primary | ICD-10-CM
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
## 2023-10-12 NOTE — PATIENT INSTRUCTIONS
Continue to work on joining reading club to increase social support to decrease depression and ease grief process, try to attend at least 1 x before our next appointment   2. Continue to scheduled regular downtime for self at home daily  3. Continue to be mindful of the guilt/blame/shame trauma \"hammer\" used to ease the emotional pain of the possible ending of your relationship  4. Continue to maintain emotional boundaries with mother  5. Continue to maintain ending relationship with Max, keep emotional boundaries as much as possible   6. Continue to schedule consistent down time for yourself for PTSD management   7.  Return to clinic for Dr John Helton in 3-4 weeks

## 2023-11-15 ENCOUNTER — OFFICE VISIT (OUTPATIENT)
Dept: PSYCHOLOGY | Age: 43
End: 2023-11-15
Payer: MEDICARE

## 2023-11-15 DIAGNOSIS — F43.10 PTSD (POST-TRAUMATIC STRESS DISORDER): Primary | ICD-10-CM

## 2023-11-15 PROCEDURE — 90832 PSYTX W PT 30 MINUTES: CPT | Performed by: PSYCHOLOGIST

## 2023-11-15 ASSESSMENT — PATIENT HEALTH QUESTIONNAIRE - PHQ9
9. THOUGHTS THAT YOU WOULD BE BETTER OFF DEAD, OR OF HURTING YOURSELF: 0
7. TROUBLE CONCENTRATING ON THINGS, SUCH AS READING THE NEWSPAPER OR WATCHING TELEVISION: 2
3. TROUBLE FALLING OR STAYING ASLEEP: 1
6. FEELING BAD ABOUT YOURSELF - OR THAT YOU ARE A FAILURE OR HAVE LET YOURSELF OR YOUR FAMILY DOWN: 1
1. LITTLE INTEREST OR PLEASURE IN DOING THINGS: 1
2. FEELING DOWN, DEPRESSED OR HOPELESS: 2
SUM OF ALL RESPONSES TO PHQ QUESTIONS 1-9: 11
SUM OF ALL RESPONSES TO PHQ9 QUESTIONS 1 & 2: 3
8. MOVING OR SPEAKING SO SLOWLY THAT OTHER PEOPLE COULD HAVE NOTICED. OR THE OPPOSITE, BEING SO FIGETY OR RESTLESS THAT YOU HAVE BEEN MOVING AROUND A LOT MORE THAN USUAL: 1
4. FEELING TIRED OR HAVING LITTLE ENERGY: 2
SUM OF ALL RESPONSES TO PHQ QUESTIONS 1-9: 11
5. POOR APPETITE OR OVEREATING: 1
10. IF YOU CHECKED OFF ANY PROBLEMS, HOW DIFFICULT HAVE THESE PROBLEMS MADE IT FOR YOU TO DO YOUR WORK, TAKE CARE OF THINGS AT HOME, OR GET ALONG WITH OTHER PEOPLE: 2
SUM OF ALL RESPONSES TO PHQ QUESTIONS 1-9: 11
SUM OF ALL RESPONSES TO PHQ QUESTIONS 1-9: 11

## 2023-11-15 ASSESSMENT — COLUMBIA-SUICIDE SEVERITY RATING SCALE - C-SSRS
7. DID THIS OCCUR IN THE LAST THREE MONTHS: NO
4. HAVE YOU HAD THESE THOUGHTS AND HAD SOME INTENTION OF ACTING ON THEM?: NO
5. HAVE YOU STARTED TO WORK OUT OR WORKED OUT THE DETAILS OF HOW TO KILL YOURSELF? DO YOU INTEND TO CARRY OUT THIS PLAN?: NO
3. HAVE YOU BEEN THINKING ABOUT HOW YOU MIGHT KILL YOURSELF?: NO

## 2023-11-15 ASSESSMENT — ANXIETY QUESTIONNAIRES
5. BEING SO RESTLESS THAT IT IS HARD TO SIT STILL: 2-OVER HALF THE DAYS
3. WORRYING TOO MUCH ABOUT DIFFERENT THINGS: 2-OVER HALF THE DAYS
2. NOT BEING ABLE TO STOP OR CONTROL WORRYING: 3-NEARLY EVERY DAY
4. TROUBLE RELAXING: 3-NEARLY EVERY DAY
1. FEELING NERVOUS, ANXIOUS, OR ON EDGE: 1
6. BECOMING EASILY ANNOYED OR IRRITABLE: 0-NOT AT ALL
7. FEELING AFRAID AS IF SOMETHING AWFUL MIGHT HAPPEN: 3-NEARLY EVERY DAY
GAD7 TOTAL SCORE: 14

## 2023-11-15 NOTE — PATIENT INSTRUCTIONS
Call about pro bonifacio counseling:    [employer]    2. Continue to work on joining reading club to increase social support to decrease depression and ease grief process, try to attend at least 1 x before our next appointment   3. Continue to scheduled regular downtime for self at home daily  4. Continue to be mindful of the guilt/blame/shame trauma \"hammer\" used to ease the emotional pain of the possible ending of your relationship  5. Continue to maintain emotional boundaries with mother  6. Continue to maintain ending relationship with Max, keep emotional boundaries as much as possible   7. Continue to schedule consistent down time for yourself for PTSD management   8.  Return to clinic for Dr Del Mcneal in 1 month

## 2023-11-15 NOTE — PROGRESS NOTES
Behavioral Health Consultation Follow-up  Tavon Busby PsyD  Psychologist  11/15/2023  12:58 PM      Time spent with Patient: 25 minutes  This is patient's  26 th   [facility] appointment. Reason for Consult:  PTSD  Referring Provider: DO Vasiliy Lou  [place],  [address]    Feedback given to PCP. S:    Last appt: 10/11. Major stressor: received letter from [id number] [facility] that she may owe money back, about 3K. Lot's of fear about how this will impact her medical insurance. She is calling and will e-mail [employer] to try to resolve this. In the mean time provided referral for pro bonifacio counseling services. Hoping this will be resolved by next month. F/u with me per our usual in 1 month.       O:  MSE:    Appearance    alert, cooperative  Appetite abnormal: poor  Sleep disturbance Yes  Fatigue Yes  Loss of pleasure Yes  Impulsive behavior No  Speech    normal rate, normal volume, and well articulated  Mood    stable, managing   Affect    normal affect  Thought Content    intact  Thought Process    linear, goal directed, and coherent  Associations    logical connections  Insight    Good  Judgment    Intact  Orientation    oriented to person, place, time, and general circumstances  Memory    recent and remote memory intact  Attention/Concentration    intact  Morbid ideation No  Suicide Assessment    no suicidal ideation      History:    Medications:   Current Outpatient Medications   Medication Sig Dispense Refill    montelukast (SINGULAIR) 10 MG tablet TAKE ONE TABLET BY MOUTH ONCE NIGHTLY 30 tablet 3    fluticasone-salmeterol (ADVAIR) 500-50 MCG/ACT AEPB diskus inhaler INHALE ONE PUFF BY MOUTH TWICE A DAY IN THE MORNING AND EVENING 60 each 0    albuterol sulfate HFA (PROVENTIL;VENTOLIN;PROAIR) 108 (90 Base) MCG/ACT inhaler INHALE 2 PUFFS EVERY 6 HOURS AS NEEDED FOR WHEEZING OR SHORTNESS OF BREATH 1 each 5    fluticasone (FLONASE) 50 MCG/ACT nasal spray 2 sprays by Nasal route daily 16 g 5    azelastine

## 2024-02-15 ENCOUNTER — OFFICE VISIT (OUTPATIENT)
Dept: PSYCHOLOGY | Age: 44
End: 2024-02-15
Payer: MEDICARE

## 2024-02-15 DIAGNOSIS — F43.10 PTSD (POST-TRAUMATIC STRESS DISORDER): Primary | ICD-10-CM

## 2024-02-15 PROCEDURE — 1036F TOBACCO NON-USER: CPT | Performed by: PSYCHOLOGIST

## 2024-02-15 PROCEDURE — 90832 PSYTX W PT 30 MINUTES: CPT | Performed by: PSYCHOLOGIST

## 2024-02-15 NOTE — PATIENT INSTRUCTIONS
Continue to work on being on the  pro bonifacio wait list counseling:     [email or web address]     2. Continue to work on joining reading club to increase social support to decrease depression and ease grief process, try to attend at least 1 x before our next appointment   3. Continue to scheduled regular downtime for self at home daily  4. Continue to be mindful of the guilt/blame/shame trauma \"hammer\" used to ease the emotional pain of the possible ending of your relationship  5. Continue to maintain emotional boundaries with mother  6. Continue to maintain ending relationship with Max, keep emotional boundaries as much as possible   7. Continue to schedule consistent down time for yourself for PTSD management   8. Return to clinic for Dr Reed in 1 month
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
## 2024-02-20 ENCOUNTER — TELEMEDICINE (OUTPATIENT)
Dept: PRIMARY CARE CLINIC | Age: 44
End: 2024-02-20

## 2024-02-20 DIAGNOSIS — Z00.00 MEDICARE ANNUAL WELLNESS VISIT, SUBSEQUENT: Primary | ICD-10-CM

## 2024-02-20 ASSESSMENT — PATIENT HEALTH QUESTIONNAIRE - PHQ9
10. IF YOU CHECKED OFF ANY PROBLEMS, HOW DIFFICULT HAVE THESE PROBLEMS MADE IT FOR YOU TO DO YOUR WORK, TAKE CARE OF THINGS AT HOME, OR GET ALONG WITH OTHER PEOPLE: 1
SUM OF ALL RESPONSES TO PHQ QUESTIONS 1-9: 6
9. THOUGHTS THAT YOU WOULD BE BETTER OFF DEAD, OR OF HURTING YOURSELF: 0
7. TROUBLE CONCENTRATING ON THINGS, SUCH AS READING THE NEWSPAPER OR WATCHING TELEVISION: 1
5. POOR APPETITE OR OVEREATING: 0
1. LITTLE INTEREST OR PLEASURE IN DOING THINGS: 1
SUM OF ALL RESPONSES TO PHQ QUESTIONS 1-9: 6
6. FEELING BAD ABOUT YOURSELF - OR THAT YOU ARE A FAILURE OR HAVE LET YOURSELF OR YOUR FAMILY DOWN: 2
3. TROUBLE FALLING OR STAYING ASLEEP: 0
SUM OF ALL RESPONSES TO PHQ9 QUESTIONS 1 & 2: 2
4. FEELING TIRED OR HAVING LITTLE ENERGY: 1
SUM OF ALL RESPONSES TO PHQ QUESTIONS 1-9: 6
SUM OF ALL RESPONSES TO PHQ QUESTIONS 1-9: 6
2. FEELING DOWN, DEPRESSED OR HOPELESS: 1
8. MOVING OR SPEAKING SO SLOWLY THAT OTHER PEOPLE COULD HAVE NOTICED. OR THE OPPOSITE, BEING SO FIGETY OR RESTLESS THAT YOU HAVE BEEN MOVING AROUND A LOT MORE THAN USUAL: 0

## 2024-02-20 ASSESSMENT — LIFESTYLE VARIABLES
HOW OFTEN DO YOU HAVE A DRINK CONTAINING ALCOHOL: NEVER
HOW MANY STANDARD DRINKS CONTAINING ALCOHOL DO YOU HAVE ON A TYPICAL DAY: PATIENT DOES NOT DRINK

## 2024-02-20 NOTE — PROGRESS NOTES
Medicare Annual Wellness Visit    Brunilda Armas is here for Medicare AWV    Assessment & Plan   Medicare annual wellness visit, subsequent  Recommendations for Preventive Services Due: see orders and patient instructions/AVS.  Recommended screening schedule for the next 5-10 years is provided to the patient in written form: see Patient Instructions/AVS.     Return for Medicare Annual Wellness Visit in 1 year.     Subjective     Patient's complete Health Risk Assessment and screening values have been reviewed and are found in Flowsheets. The following problems were reviewed today and where indicated follow up appointments were made and/or referrals ordered.    Positive Risk Factor Screenings with Interventions:        Depression:  PHQ-2 Score: 2  PHQ-9 Total Score: 6    Interpretation:  5-9 mild   10-14 moderate   15-19 moderately severe   20-27 severe     Interventions:  Monitored by specialist. No acute findings meriting change in the plan     Drug Use:   Substance and Sexual Activity   Drug Use Yes    Types: Marijuana (Weed)    Comment: Edibles and THC pen     Interventions:  Patient declined any further intervention or treatment       Self-assessment of health:  In general, how would you say your health is?: (!) Poor (Mental status, Has found a lump and having fear of cancer. Insurance confusion)    Interventions:  Patient declines any further evaluation or treatment    General HRA Questions:  Select all that apply: (!) Stress (Insurance issues)    Stress Interventions:  Patient comments: See Above      Activity, Diet, and Weight:               There is no height or weight on file to calculate BMI. (!) Abnormal    Obesity Interventions:  Patient declines any further evaluation or treatment      Dentist Screen:  Have you seen the dentist within the past year?: (!) No    Intervention:  Advised to schedule with their dentist     Vision Screen:  Do you have difficulty driving, watching TV, or doing any of your daily

## 2024-03-19 DIAGNOSIS — J45.40 MODERATE PERSISTENT ASTHMA WITHOUT COMPLICATION: ICD-10-CM

## 2024-03-19 DIAGNOSIS — R42 VERTIGO: ICD-10-CM

## 2024-03-19 NOTE — TELEPHONE ENCOUNTER
Medication:   Requested Prescriptions     Pending Prescriptions Disp Refills    albuterol sulfate HFA (PROVENTIL;VENTOLIN;PROAIR) 108 (90 Base) MCG/ACT inhaler [Pharmacy Med Name: ALBUTEROL HFA 90 MCG INHALER] 18 g      Sig: INHALE TWO PUFFS BY MOUTH EVERY 6 HOURS AS NEEDED FOR WHEEZING OR FOR SHORTNESS OF BREATH    meclizine (ANTIVERT) 12.5 MG tablet [Pharmacy Med Name: MECLIZINE 12.5 MG TABLET] 30 tablet 0     Sig: TAKE ONE TABLET BY MOUTH THREE TIMES A DAY AS NEEDED FOR DIZZINESS        Last Filled:  05/4/23,04/10/23    Patient Phone Number: [phone] (home)     Last appt: 2/20/2024  Return if symptoms worsen or fail to improve.  Next appt: Visit date not found    Last OARRS:       12/10/2019     4:24 PM   RX Monitoring   Periodic Controlled Substance Monitoring No signs of potential drug abuse or diversion identified.

## 2024-03-20 RX ORDER — ALBUTEROL SULFATE 90 UG/1
AEROSOL, METERED RESPIRATORY (INHALATION)
Qty: 18 G | Refills: 5 | Status: SHIPPED | OUTPATIENT
Start: 2024-03-20

## 2024-03-20 RX ORDER — MECLIZINE HCL 12.5 MG/1
TABLET ORAL
Qty: 30 TABLET | Refills: 0 | Status: SHIPPED | OUTPATIENT
Start: 2024-03-20

## 2024-03-21 ENCOUNTER — OFFICE VISIT (OUTPATIENT)
Dept: PSYCHOLOGY | Age: 44
End: 2024-03-21
Payer: MEDICARE

## 2024-03-21 DIAGNOSIS — F43.10 PTSD (POST-TRAUMATIC STRESS DISORDER): Primary | ICD-10-CM

## 2024-03-21 PROCEDURE — 90832 PSYTX W PT 30 MINUTES: CPT | Performed by: PSYCHOLOGIST

## 2024-03-21 PROCEDURE — 1036F TOBACCO NON-USER: CPT | Performed by: PSYCHOLOGIST

## 2024-03-21 ASSESSMENT — PATIENT HEALTH QUESTIONNAIRE - PHQ9
8. MOVING OR SPEAKING SO SLOWLY THAT OTHER PEOPLE COULD HAVE NOTICED. OR THE OPPOSITE, BEING SO FIGETY OR RESTLESS THAT YOU HAVE BEEN MOVING AROUND A LOT MORE THAN USUAL: MORE THAN HALF THE DAYS
5. POOR APPETITE OR OVEREATING: NEARLY EVERY DAY
SUM OF ALL RESPONSES TO PHQ9 QUESTIONS 1 & 2: 3
SUM OF ALL RESPONSES TO PHQ QUESTIONS 1-9: 13
9. THOUGHTS THAT YOU WOULD BE BETTER OFF DEAD, OR OF HURTING YOURSELF: NOT AT ALL
SUM OF ALL RESPONSES TO PHQ QUESTIONS 1-9: 13
3. TROUBLE FALLING OR STAYING ASLEEP: NEARLY EVERY DAY
7. TROUBLE CONCENTRATING ON THINGS, SUCH AS READING THE NEWSPAPER OR WATCHING TELEVISION: SEVERAL DAYS
6. FEELING BAD ABOUT YOURSELF - OR THAT YOU ARE A FAILURE OR HAVE LET YOURSELF OR YOUR FAMILY DOWN: NOT AT ALL
1. LITTLE INTEREST OR PLEASURE IN DOING THINGS: SEVERAL DAYS
10. IF YOU CHECKED OFF ANY PROBLEMS, HOW DIFFICULT HAVE THESE PROBLEMS MADE IT FOR YOU TO DO YOUR WORK, TAKE CARE OF THINGS AT HOME, OR GET ALONG WITH OTHER PEOPLE: VERY DIFFICULT
2. FEELING DOWN, DEPRESSED OR HOPELESS: MORE THAN HALF THE DAYS
SUM OF ALL RESPONSES TO PHQ QUESTIONS 1-9: 13
4. FEELING TIRED OR HAVING LITTLE ENERGY: SEVERAL DAYS
SUM OF ALL RESPONSES TO PHQ QUESTIONS 1-9: 13

## 2024-03-21 ASSESSMENT — ANXIETY QUESTIONNAIRES
3. WORRYING TOO MUCH ABOUT DIFFERENT THINGS: 3-NEARLY EVERY DAY
5. BEING SO RESTLESS THAT IT IS HARD TO SIT STILL: 2-OVER HALF THE DAYS
1. FEELING NERVOUS, ANXIOUS, OR ON EDGE: NEARLY EVERY DAY
6. BECOMING EASILY ANNOYED OR IRRITABLE: 0-NOT AT ALL
7. FEELING AFRAID AS IF SOMETHING AWFUL MIGHT HAPPEN: 3-NEARLY EVERY DAY
4. TROUBLE RELAXING: 3-NEARLY EVERY DAY
2. NOT BEING ABLE TO STOP OR CONTROL WORRYING: 3-NEARLY EVERY DAY
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
## 2024-03-21 NOTE — PATIENT INSTRUCTIONS
Continue to work on being on the  pro bonif[place] wait list counseling:     [email or web address]     2. Continue to work on joining reading club to increase social support to decrease depression and ease grief process, try to attend at least 1 x before our next appointment   3. Continue to scheduled regular downtime for self at home daily  4. Continue to be mindful of the guilt/blame/shame trauma \"hammer\" used to ease the emotional pain of the possible ending of your relationship  5. Continue to maintain emotional boundaries with mother  6. Continue to maintain ending relationship with Max, keep emotional boundaries as much as possible   7. Continue to schedule consistent down time for yourself for PTSD management   8. Return to clinic for Dr Reed in 1 month

## 2024-04-24 ENCOUNTER — OFFICE VISIT (OUTPATIENT)
Dept: PSYCHOLOGY | Age: 44
End: 2024-04-24
Payer: MEDICARE

## 2024-04-24 DIAGNOSIS — F43.10 PTSD (POST-TRAUMATIC STRESS DISORDER): Primary | ICD-10-CM

## 2024-04-24 PROCEDURE — 1036F TOBACCO NON-USER: CPT | Performed by: PSYCHOLOGIST

## 2024-04-24 PROCEDURE — 90832 PSYTX W PT 30 MINUTES: CPT | Performed by: PSYCHOLOGIST

## 2024-04-24 ASSESSMENT — PATIENT HEALTH QUESTIONNAIRE - PHQ9
SUM OF ALL RESPONSES TO PHQ QUESTIONS 1-9: 16
1. LITTLE INTEREST OR PLEASURE IN DOING THINGS: MORE THAN HALF THE DAYS
10. IF YOU CHECKED OFF ANY PROBLEMS, HOW DIFFICULT HAVE THESE PROBLEMS MADE IT FOR YOU TO DO YOUR WORK, TAKE CARE OF THINGS AT HOME, OR GET ALONG WITH OTHER PEOPLE: VERY DIFFICULT
SUM OF ALL RESPONSES TO PHQ QUESTIONS 1-9: 16
4. FEELING TIRED OR HAVING LITTLE ENERGY: MORE THAN HALF THE DAYS
9. THOUGHTS THAT YOU WOULD BE BETTER OFF DEAD, OR OF HURTING YOURSELF: NOT AT ALL
3. TROUBLE FALLING OR STAYING ASLEEP: MORE THAN HALF THE DAYS
8. MOVING OR SPEAKING SO SLOWLY THAT OTHER PEOPLE COULD HAVE NOTICED. OR THE OPPOSITE, BEING SO FIGETY OR RESTLESS THAT YOU HAVE BEEN MOVING AROUND A LOT MORE THAN USUAL: MORE THAN HALF THE DAYS
7. TROUBLE CONCENTRATING ON THINGS, SUCH AS READING THE NEWSPAPER OR WATCHING TELEVISION: MORE THAN HALF THE DAYS
2. FEELING DOWN, DEPRESSED OR HOPELESS: NEARLY EVERY DAY
5. POOR APPETITE OR OVEREATING: NEARLY EVERY DAY
6. FEELING BAD ABOUT YOURSELF - OR THAT YOU ARE A FAILURE OR HAVE LET YOURSELF OR YOUR FAMILY DOWN: NOT AT ALL
SUM OF ALL RESPONSES TO PHQ9 QUESTIONS 1 & 2: 5

## 2024-04-24 ASSESSMENT — ANXIETY QUESTIONNAIRES
2. NOT BEING ABLE TO STOP OR CONTROL WORRYING: 3-NEARLY EVERY DAY
3. WORRYING TOO MUCH ABOUT DIFFERENT THINGS: 3-NEARLY EVERY DAY
GAD7 TOTAL SCORE: 18
5. BEING SO RESTLESS THAT IT IS HARD TO SIT STILL: 3-NEARLY EVERY DAY
1. FEELING NERVOUS, ANXIOUS, OR ON EDGE: NEARLY EVERY DAY
7. FEELING AFRAID AS IF SOMETHING AWFUL MIGHT HAPPEN: 3-NEARLY EVERY DAY
4. TROUBLE RELAXING: 3-NEARLY EVERY DAY
6. BECOMING EASILY ANNOYED OR IRRITABLE: 0-NOT AT ALL

## 2024-04-24 NOTE — PATIENT INSTRUCTIONS
Continue to work on being on the  pro bonifacio wait list counseling:     [email or web address]     2. Continue to work on joining reading club to increase social support to decrease depression and ease grief process, try to attend at least 1 x before our next appointment   3. Continue to scheduled regular downtime for self at home daily  4. Continue to be mindful of the guilt/blame/shame trauma \"hammer\" used to ease the emotional pain of the possible ending of your relationship  5. Continue to maintain emotional boundaries with mother  6. Continue to maintain ending relationship with Max, keep emotional boundaries as much as possible   7. Continue to schedule consistent down time for yourself for PTSD management: have fun with others socially and dating wise.   8. Return to clinic for Dr Reed in 1 month

## 2024-04-24 NOTE — PROGRESS NOTES
syndrome)     Multiple benign melanocytic nevi 04/10/2019    Neuropathy     Obesity     OCD (obsessive compulsive disorder)     PTSD (post-traumatic stress disorder)     Urinary incontinence     Vertigo      Problems with primary support group, Problems related to the social environment, and Other psychosocial and environmental problems    Plan:  Pt interventions:    Practiced assertive communication, Trained in strategies for increasing balanced thinking, and Discussed self-care (sleep, nutrition, rewarding activities, social support, exercise)    Pt Behavioral Change Plan:    Continue to work on being on the  pro bonifacio wait list counseling:     [email or web address]     2. Continue to work on joining [employer] club to increase social support to decrease depression and ease grief process, try to attend at least 1 x before our next appointment   3. Continue to scheduled regular downtime for self at home daily  4. Continue to be mindful of the guilt/blame/shame trauma \"hammer\" used to ease the emotional pain of the possible ending of your relationship  5. Continue to maintain emotional boundaries with mother  6. Continue to maintain ending relationship with Max, keep emotional boundaries as much as possible   7. Continue to schedule consistent down time for yourself for PTSD management   8. Return to clinic for Dr Reed in 1 month

## 2024-05-17 DIAGNOSIS — Z85.42 HISTORY OF UTERINE CANCER: Primary | ICD-10-CM

## 2024-05-23 ENCOUNTER — SCHEDULED TELEPHONE ENCOUNTER (OUTPATIENT)
Dept: PSYCHOLOGY | Age: 44
End: 2024-05-23

## 2024-05-23 DIAGNOSIS — F43.10 PTSD (POST-TRAUMATIC STRESS DISORDER): Primary | ICD-10-CM

## 2024-05-23 NOTE — PROGRESS NOTES
Behavioral Health Consultation Follow-up  Theresa Reed PsyD  Psychologist  5/23/2024  1:01 PM    NOTE - this visit conducted via audio means : MyChart, Doxy, etc, in accordance with CMS guidelines. The patient (or guardian if applicable) is aware that this is a billable service, which includes applicable co-pays. This Virtual Visit was conducted with patient's (and/or legal guardian's) consent.  Telemedicine APA guidelines were reviewed and discussed. The patient was located in a state where the provider was licensed to provide care.     Location of [place]: [facility]  Location of patient: home     Time spent with Patient: 30 minutes  This is patient's  31st   [facility] appointment.    Reason for Consult:  PTSD  Referring Provider: Elizabeth Raymond DO  [address] [place] [address]    Feedback given to PCP.    S:    Last appt: 4/24. [place] in [place], \"glad she is gone\". Relief and grief. And waiting for the \"shoe to fall again\", when she returns. Explored ways to support her during this alone time versus being abandoned. Mother comes back June 1. Pt leaves for [place] June 4th.     Fun: going to [place], going to stay at school, tour program, to meet admissions. June 7 th.     OB/GYN: has consult in June, hysterectomy and concern about cancer returning.      O:  MSE:    Appetite normal  Sleep disturbance No  Fatigue Yes  Loss of pleasure No  Impulsive behavior No  Speech    normal rate, normal volume, and well articulated  Mood    stable, managing   Thought Content    intact  Thought Process    linear, goal directed, and coherent  Associations    logical connections  Insight    Good  Judgment    Intact  Orientation    oriented to person, place, time, and general circumstances  Memory    recent and remote memory intact  Attention/Concentration    intact  Morbid ideation No  Suicide Assessment    no suicidal ideation      History:    Medications:   Current Outpatient Medications   Medication Sig Dispense Refill

## 2024-05-23 NOTE — PATIENT INSTRUCTIONS
Continue to work on being on the  pro bonifacio wait list counseling:     [email or web address]     2. Continue to work on joining reading club to increase social support to decrease depression and ease grief process, try to attend at least 1 x before our next appointment   3. Continue to scheduled regular downtime for self at home daily  4. Continue to be mindful of the guilt/blame/shame trauma \"hammer\" used to ease the emotional pain of the possible ending of your relationship  5. Continue to maintain emotional boundaries with mother  6. Continue to maintain ending relationship with Max, keep emotional boundaries as much as possible   7. Continue to schedule consistent down time for yourself for PTSD management   8. Enjoy trip to [place]: June 4-7 th  9. Enjoy time alone to yourself while mother is out of town until June 1!  10.. Return to clinic for Dr Reed in 1 month

## 2024-06-17 DIAGNOSIS — J30.9 ALLERGIC RHINITIS, UNSPECIFIED SEASONALITY, UNSPECIFIED TRIGGER: ICD-10-CM

## 2024-06-17 DIAGNOSIS — R42 VERTIGO: ICD-10-CM

## 2024-06-17 DIAGNOSIS — R11.0 CHRONIC NAUSEA: ICD-10-CM

## 2024-06-17 DIAGNOSIS — J45.40 MODERATE PERSISTENT ASTHMA WITHOUT COMPLICATION: ICD-10-CM

## 2024-06-17 RX ORDER — ALBUTEROL SULFATE 2.5 MG/3ML
SOLUTION RESPIRATORY (INHALATION)
Qty: 360 ML | Refills: 3 | Status: SHIPPED | OUTPATIENT
Start: 2024-06-17

## 2024-06-17 RX ORDER — ONDANSETRON 4 MG/1
TABLET, FILM COATED ORAL
Qty: 30 TABLET | Refills: 3 | Status: SHIPPED | OUTPATIENT
Start: 2024-06-17

## 2024-06-17 NOTE — TELEPHONE ENCOUNTER
Medication:   Requested Prescriptions     Pending Prescriptions Disp Refills    ondansetron (ZOFRAN) 4 MG tablet [Pharmacy Med Name: ONDANSETRON HCL 4 MG TABLET] 30 tablet 3     Sig: TAKE ONE TABLET BY MOUTH EVERY 8 HOURS AS NEEDED FOR NAUSEA AND VOMITING    albuterol (PROVENTIL) (2.5 MG/3ML) 0.083% nebulizer solution [Pharmacy Med Name: ALBUTEROL 0.083% INH SOL (60 VIALS)] 360 mL 3     Sig: INHALE THREE MILLILITERS (1 VIAL) VIA NEBULIZATION BY MOUTH EVERY 6 HOURS AS NEEDED FOR WHEEZING        Last Filled:  04/10/23.    Patient Phone Number: [phone] (home)     Last appt: 2/20/2024   Next appt: Visit date not found    Last OARRS:       12/10/2019     4:24 PM   RX Monitoring   Periodic Controlled Substance Monitoring No signs of potential drug abuse or diversion identified.

## 2024-06-18 RX ORDER — FLUTICASONE PROPIONATE 50 MCG
SPRAY, SUSPENSION (ML) NASAL
OUTPATIENT
Start: 2024-06-18

## 2024-07-25 ENCOUNTER — OFFICE VISIT (OUTPATIENT)
Dept: PSYCHOLOGY | Age: 44
End: 2024-07-25
Payer: MEDICARE

## 2024-07-25 DIAGNOSIS — F43.10 PTSD (POST-TRAUMATIC STRESS DISORDER): Primary | ICD-10-CM

## 2024-07-25 PROCEDURE — 1036F TOBACCO NON-USER: CPT | Performed by: PSYCHOLOGIST

## 2024-07-25 PROCEDURE — 90837 PSYTX W PT 60 MINUTES: CPT | Performed by: PSYCHOLOGIST

## 2024-07-25 NOTE — PATIENT INSTRUCTIONS
Continue to work on being on the  pro bonifacio wait list counseling:     [email or web address]     2. Continue to work on joining reading club to increase social support to decrease depression and ease grief process, try to attend at least 1 x between our appointments  3. Continue to scheduled regular downtime for self at home daily  4. Continue to be mindful of the guilt/blame/shame trauma \"hammer\" used to ease the emotional pain of the possible ending of your relationship  5. Continue to maintain emotional boundaries with mother  6. Continue to maintain ending relationship with Max, keep emotional boundaries as much as possible   7. Continue to schedule consistent down time for yourself for PTSD management   8. Enjoy staying connected to new social support in [place]  9. Enjoy time alone to yourself while mother is out of town for 2 weeks in August  10. Go to new OB/GYN specialist Dr Lidia Link on 8/8  11. Return to clinic for Dr Reed in 2-4 weeks
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
## 2024-08-14 ENCOUNTER — OFFICE VISIT (OUTPATIENT)
Dept: PSYCHOLOGY | Age: 44
End: 2024-08-14
Payer: MEDICARE

## 2024-08-14 DIAGNOSIS — F43.10 PTSD (POST-TRAUMATIC STRESS DISORDER): Primary | ICD-10-CM

## 2024-08-14 PROCEDURE — 90832 PSYTX W PT 30 MINUTES: CPT | Performed by: PSYCHOLOGIST

## 2024-08-14 PROCEDURE — 1036F TOBACCO NON-USER: CPT | Performed by: PSYCHOLOGIST

## 2024-08-14 ASSESSMENT — ANXIETY QUESTIONNAIRES
4. TROUBLE RELAXING: 3-NEARLY EVERY DAY
3. WORRYING TOO MUCH ABOUT DIFFERENT THINGS: 3-NEARLY EVERY DAY
GAD7 TOTAL SCORE: 18
2. NOT BEING ABLE TO STOP OR CONTROL WORRYING: 3-NEARLY EVERY DAY
1. FEELING NERVOUS, ANXIOUS, OR ON EDGE: NEARLY EVERY DAY
6. BECOMING EASILY ANNOYED OR IRRITABLE: 0-NOT AT ALL
7. FEELING AFRAID AS IF SOMETHING AWFUL MIGHT HAPPEN: 3-NEARLY EVERY DAY
5. BEING SO RESTLESS THAT IT IS HARD TO SIT STILL: 3-NEARLY EVERY DAY

## 2024-08-14 ASSESSMENT — PATIENT HEALTH QUESTIONNAIRE - PHQ9
SUM OF ALL RESPONSES TO PHQ QUESTIONS 1-9: 11
8. MOVING OR SPEAKING SO SLOWLY THAT OTHER PEOPLE COULD HAVE NOTICED. OR THE OPPOSITE, BEING SO FIGETY OR RESTLESS THAT YOU HAVE BEEN MOVING AROUND A LOT MORE THAN USUAL: MORE THAN HALF THE DAYS
SUM OF ALL RESPONSES TO PHQ9 QUESTIONS 1 & 2: 2
2. FEELING DOWN, DEPRESSED OR HOPELESS: SEVERAL DAYS
6. FEELING BAD ABOUT YOURSELF - OR THAT YOU ARE A FAILURE OR HAVE LET YOURSELF OR YOUR FAMILY DOWN: NOT AT ALL
SUM OF ALL RESPONSES TO PHQ QUESTIONS 1-9: 11
9. THOUGHTS THAT YOU WOULD BE BETTER OFF DEAD, OR OF HURTING YOURSELF: NOT AT ALL
5. POOR APPETITE OR OVEREATING: NEARLY EVERY DAY
3. TROUBLE FALLING OR STAYING ASLEEP: SEVERAL DAYS
SUM OF ALL RESPONSES TO PHQ QUESTIONS 1-9: 11
4. FEELING TIRED OR HAVING LITTLE ENERGY: SEVERAL DAYS
SUM OF ALL RESPONSES TO PHQ QUESTIONS 1-9: 11
10. IF YOU CHECKED OFF ANY PROBLEMS, HOW DIFFICULT HAVE THESE PROBLEMS MADE IT FOR YOU TO DO YOUR WORK, TAKE CARE OF THINGS AT HOME, OR GET ALONG WITH OTHER PEOPLE: VERY DIFFICULT
1. LITTLE INTEREST OR PLEASURE IN DOING THINGS: SEVERAL DAYS
7. TROUBLE CONCENTRATING ON THINGS, SUCH AS READING THE NEWSPAPER OR WATCHING TELEVISION: MORE THAN HALF THE DAYS

## 2024-08-14 NOTE — PATIENT INSTRUCTIONS
Continue to work on being on the  pro bonifacio wait list counseling:     [email or web address]     2. Continue to work on joining reading club to increase social support to decrease depression and ease grief process, try to attend at least 1 x between our appointments  3. Continue to scheduled regular downtime for self at home daily  4. Continue to be mindful of the guilt/blame/shame trauma \"hammer\" used to ease the emotional pain of the possible ending of your relationship  5. Continue to maintain emotional boundaries with mother  6. Continue to maintain ending relationship with Max, keep emotional boundaries as much as possible   7. Continue to schedule consistent down time for yourself for PTSD management   8. Continue to enjoy staying connected to new social support in [place]  9. Have fun on your first date in [place] with long time male friend  10. Continue to work on concern about cancer returning, specialist appointment in September 11. Return to clinic for Dr Reed in 2 weeks

## 2024-08-14 NOTE — PROGRESS NOTES
Behavioral Health Consultation Follow-up  Theresa Reed PsyD  Psychologist  8/14/2024  2:40 PM      Time spent with Patient: 23 minutes  This is patient's  33rd   [facility] appointment.    Reason for Consult:  PTSD  Referring Provider: Elizabeth Raymond DO  [address] [place] [address]    Feedback given to PCP.    S:    Last appt: 7/25. Mood is bright and she is smiling ear to ear. Been connecting with old friend that is a professor. Will be meeting him in [place] next weekend for their \"first date\". More boundaries around her relationship with her mother, and pt basically said today, \"I'm done\".     Medical concern: has GYN appointment in September to address worry about any potential cancer returning.     O:  MSE:    Appearance    alert, cooperative  Appetite normal  Sleep disturbance No  Fatigue No  Loss of pleasure No  Impulsive behavior No  Speech    normal rate, normal volume, and well articulated  Mood    Anxious  Affect    normal affect  Thought Content    intact  Thought Process    linear, goal directed, and coherent  Associations    logical connections  Insight    Good  Judgment    Intact  Orientation    oriented to person, place, time, and general circumstances  Memory    recent and remote memory intact  Attention/Concentration    intact  Morbid ideation No  Suicide Assessment    no suicidal ideation      History:    Medications:   Current Outpatient Medications   Medication Sig Dispense Refill    ondansetron (ZOFRAN) 4 MG tablet TAKE ONE TABLET BY MOUTH EVERY 8 HOURS AS NEEDED FOR NAUSEA AND VOMITING 30 tablet 3    albuterol (PROVENTIL) (2.5 MG/3ML) 0.083% nebulizer solution INHALE THREE MILLILITERS (1 VIAL) VIA NEBULIZATION BY MOUTH EVERY 6 HOURS AS NEEDED FOR WHEEZING 360 mL 3    albuterol sulfate HFA (PROVENTIL;VENTOLIN;PROAIR) 108 (90 Base) MCG/ACT inhaler INHALE TWO PUFFS BY MOUTH EVERY 6 HOURS AS NEEDED FOR WHEEZING OR FOR SHORTNESS OF BREATH 18 g 5    meclizine (ANTIVERT) 12.5 MG tablet

## 2024-08-28 ENCOUNTER — SCHEDULED TELEPHONE ENCOUNTER (OUTPATIENT)
Dept: PSYCHOLOGY | Age: 44
End: 2024-08-28
Payer: MEDICARE

## 2024-08-28 DIAGNOSIS — F43.10 PTSD (POST-TRAUMATIC STRESS DISORDER): Primary | ICD-10-CM

## 2024-08-28 PROCEDURE — 98968 PH1 ASSMT&MGMT NQHP 21-30: CPT | Performed by: PSYCHOLOGIST

## 2024-08-28 NOTE — PROGRESS NOTES
Behavioral Health Consultation Follow-up  Theresa Reed PsyD  Psychologist  8/28/2024  1:06 PM    NOTE - this visit conducted via audio means : MyChart, Doxy, etc, in accordance with CMS guidelines. The patient (or guardian if applicable) is aware that this is a billable service, which includes applicable co-pays. This Virtual Visit was conducted with patient's (and/or legal guardian's) consent.  Telemedicine APA guidelines were reviewed and discussed. The patient was located in a state where the provider was licensed to provide care.     Location of provider: [facility]  Location of patient: home     Time spent with Patient: 30 minutes  This is patient's  34 th   [facility] appointment.    Reason for Consult:  PTSD  Referring Provider: Elizabeth Raymond DO  [address] [place] [address]    Feedback given to PCP.    S:    Last appt: 8/14. Telephone visit due to mother taking pt's transportation. Stressor: mother left just now, taking car, moving out. Feeling \"amazing\" that her mother has finally left to go live with her mother's brother. More focus on how she can focus on her health. Good news: has GYN follow up on 9/25 with some imaging. Worry about cancer returning but agreed she is being proactive. Having mother out of the house will reduce stress greatly.     O:  MSE:    Appetite normal  Sleep disturbance No  Fatigue No  Loss of pleasure Yes  Impulsive behavior No  Speech    normal rate, normal volume, and well articulated  Mood    stable, managing  Thought Content    intact  Thought Process    linear, goal directed, and coherent  Associations    logical connections  Insight    Good  Judgment    Intact  Orientation    oriented to person, place, time, and general circumstances  Memory    recent and remote memory intact  Attention/Concentration    intact  Morbid ideation No  Suicide Assessment    no suicidal ideation      History:    Medications:   Current Outpatient Medications   Medication Sig Dispense  Speech Language Pathology      Forest Lopez  MRN: [id number]    Patient not seen today secondary to Other (Comment). Attempted SLP session x2 this date. Pt declining tx despite MAX motivational cues. Will follow-up next available date 3/15/22.

## 2024-08-28 NOTE — PATIENT INSTRUCTIONS
Continue to work on being on the  pro bonifacio wait list counseling:     [email or web address]     2. Continue to work on joining reading club to increase social support to decrease depression and ease grief process, try to attend at least 1 x between our appointments  3. Continue to scheduled regular downtime for self at home daily  4. Continue to be mindful of the guilt/blame/shame trauma \"hammer\" used to ease the emotional pain of the possible ending of your relationship  5. Continue to maintain emotional boundaries with mother  6. Continue to maintain ending relationship with Max, keep emotional boundaries as much as possible   7. Continue to schedule consistent down time for yourself for PTSD management   8. Continue to enjoy staying connected to new social support in [place]  9. Continue to work on concern about cancer returning, specialist appointment in September, 25 th with DR Link   10. Return to clinic for Dr Reed in 3 weeks

## 2024-09-09 ENCOUNTER — TELEPHONE (OUTPATIENT)
Dept: PRIMARY CARE CLINIC | Age: 44
End: 2024-09-09

## 2024-09-12 ENCOUNTER — TELEMEDICINE (OUTPATIENT)
Dept: PSYCHOLOGY | Age: 44
End: 2024-09-12

## 2024-09-12 DIAGNOSIS — F60.3 BORDERLINE PERSONALITY DISORDER (HCC): ICD-10-CM

## 2024-09-12 DIAGNOSIS — F43.10 PTSD (POST-TRAUMATIC STRESS DISORDER): Primary | ICD-10-CM

## 2024-10-01 ENCOUNTER — TELEPHONE (OUTPATIENT)
Dept: PRIMARY CARE CLINIC | Age: 44
End: 2024-10-01

## 2024-10-01 NOTE — TELEPHONE ENCOUNTER
Pt called stated thanks to Nely saying she's a horrible person that she now have lost Dr. PORTER and Theresa and that she's going to die because of Nely because she can't get her asthma medication or get treated for her cancer said tell Nely thanks and hung up.

## 2024-10-09 ENCOUNTER — TELEMEDICINE (OUTPATIENT)
Dept: PSYCHOLOGY | Age: 44
End: 2024-10-09

## 2024-10-09 DIAGNOSIS — F43.10 PTSD (POST-TRAUMATIC STRESS DISORDER): Primary | ICD-10-CM

## 2024-10-09 DIAGNOSIS — F60.3 BORDERLINE PERSONALITY DISORDER (HCC): ICD-10-CM

## 2024-10-09 NOTE — PROGRESS NOTES
Behavioral Health Consultation Follow-up  Theresa Reed PsyD  Psychologist  10/9/2024  1:02 PM    NOTE - this visit conducted via audio means : MyChart, Doxy, etc, in accordance with CMS guidelines. The patient (or guardian if applicable) is aware that this is a billable service, which includes applicable co-pays. This Virtual Visit was conducted with patient's (and/or legal guardian's) consent.  Telemedicine APA guidelines were reviewed and discussed. The patient was located in a state where the provider was licensed to provide care.     Location of provider: [facility]  Location of patient: home     Time spent with Patient: 35 minutes  This is patient's  36 th   [facility] appointment.    Reason for Consult:  PTSD  Referring Provider: Elizabeth Raymond DO  [address] [place] [address]    Feedback given to PCP.    S:    Last appt: 9/12. More MI to move pt towards engaging in referral for outpatient psychotherapy. Continues to feel angry about dismissal but understands the policy and procedure in regards to how I am attached to this office.     O:  MSE:    Appetite abnormal: poor  Sleep disturbance Yes  Fatigue Yes  Loss of pleasure Yes  Impulsive behavior No  Speech    normal rate, normal volume, and well articulated  Mood    stable, managing but understandably stressed by transition of care   Thought Content    intact  Thought Process    linear, goal directed, and coherent  Associations    logical connections  Insight    fair to good  Judgment    Intact  Orientation    oriented to person, place, time, and general circumstances  Memory    recent and remote memory intact  Attention/Concentration    intact  Morbid ideation No  Suicide Assessment    no suicidal ideation    History:    Medications:   Current Outpatient Medications   Medication Sig Dispense Refill    ondansetron (ZOFRAN) 4 MG tablet TAKE ONE TABLET BY MOUTH EVERY 8 HOURS AS NEEDED FOR NAUSEA AND VOMITING 30 tablet 3    albuterol (PROVENTIL)

## 2024-10-23 ENCOUNTER — PATIENT MESSAGE (OUTPATIENT)
Dept: PSYCHOLOGY | Age: 44
End: 2024-10-23

## 2024-10-23 NOTE — TELEPHONE ENCOUNTER
Telephone contact: 12 minutes    S/O: reached out after receiving my chart message about suicidal feelings. When assessed further she strongly emphasized \"I will not harm myself\" and that she was having a \"bad day\" due to dear friend getting  and the anniversary of grandmother's death. Mother relationship continues to be strained. When pt reached out to her today, mother stated she couldn't \"handle\" what pt wanted to talk about.     Reminded pt we have telephone appointment tomorrow. Lots of worry this is the last one. Explained that this is not the case but will still need to talk about transition of care plan at some point.     A/P: Denied any si/hi risk, intent, or plan. Expressed understanding that my chart is not appropriate to use for potential emergency care. Provided and reviewed contact with  Psychiatric emergency services: [phone]. Pt contracted for safety and will call this number if needs to go to ER.    Will call for scheduled telephone appointment tomorrow at 3 PM.

## 2024-10-24 ENCOUNTER — TELEMEDICINE (OUTPATIENT)
Dept: PSYCHOLOGY | Age: 44
End: 2024-10-24

## 2024-10-24 DIAGNOSIS — F43.10 PTSD (POST-TRAUMATIC STRESS DISORDER): Primary | ICD-10-CM

## 2024-10-24 DIAGNOSIS — F60.3 BORDERLINE PERSONALITY DISORDER (HCC): ICD-10-CM

## 2024-10-24 NOTE — PATIENT INSTRUCTIONS
Continue to reach out to [facility] for counseling and case management support, will talk with agency on Monday, 10/28   2. Continue to stay connected socially with others to reduce isolation   3. 4. Continue to maintain emotional boundaries with mother  5. Continue to maintain ending relationship with Max, keep emotional boundaries as much as possible even though his mother is helping with repair of father's car so that you have transportation    6. Go to mammogram on 10/30 for breast screening  7. Continue to consult with Dr Link in regards to corrective vaginal surgery from prior cancer surgery  7. Go to ER if mood worsens between medical appointments or call [facility] Crisis team: [phone]   8. Return to clinic for Dr Reed on 10/9 telephone appointment

## 2024-10-24 NOTE — PROGRESS NOTES
O:  MSE:    Appearance    alert, cooperative  Appetite better  Sleep disturbance Yes  Fatigue Yes  Loss of pleasure Yes  Impulsive behavior No  Speech    normal rate, normal volume, and well articulated  Mood    stressed but managing, returning to baseline  Affect    anxiety  Thought Content    intact  Thought Process    linear, goal directed, and coherent  Associations    logical connections  Insight    Good  Judgment    Intact  Orientation    oriented to person, place, time, and general circumstances  Memory    recent and remote memory intact  Attention/Concentration    intact  Morbid ideation No  Suicide Assessment    no suicidal ideation    History:    Medications:   Current Outpatient Medications   Medication Sig Dispense Refill    ondansetron (ZOFRAN) 4 MG tablet TAKE ONE TABLET BY MOUTH EVERY 8 HOURS AS NEEDED FOR NAUSEA AND VOMITING 30 tablet 3    albuterol (PROVENTIL) (2.5 MG/3ML) 0.083% nebulizer solution INHALE THREE MILLILITERS (1 VIAL) VIA NEBULIZATION BY MOUTH EVERY 6 HOURS AS NEEDED FOR WHEEZING 360 mL 3    albuterol sulfate HFA (PROVENTIL;VENTOLIN;PROAIR) 108 (90 Base) MCG/ACT inhaler INHALE TWO PUFFS BY MOUTH EVERY 6 HOURS AS NEEDED FOR WHEEZING OR FOR SHORTNESS OF BREATH 18 g 5    meclizine (ANTIVERT) 12.5 MG tablet TAKE ONE TABLET BY MOUTH THREE TIMES A DAY AS NEEDED FOR DIZZINESS 30 tablet 0    montelukast (SINGULAIR) 10 MG tablet TAKE ONE TABLET BY MOUTH ONCE NIGHTLY 30 tablet 3    fluticasone-salmeterol (ADVAIR) 500-50 MCG/ACT AEPB diskus inhaler INHALE ONE PUFF BY MOUTH TWICE A DAY IN THE MORNING AND EVENING 60 each 0    fluticasone (FLONASE) 50 MCG/ACT nasal spray 2 sprays by Nasal route daily 16 g 5    azelastine (ASTELIN) 0.1 % nasal spray 1 spray by Nasal route 2 times daily Use in each nostril as directed 30 mL 6    SUMAtriptan (IMITREX) 50 MG tablet Take 1 tablet by mouth once as needed for Migraine 9 tablet 1    FLUoxetine (PROZAC) 40 MG capsule TAKE TWO CAPSULES BY MOUTH DAILY 60

## 2024-11-07 ENCOUNTER — TELEMEDICINE (OUTPATIENT)
Dept: PSYCHOLOGY | Age: 44
End: 2024-11-07

## 2024-11-07 DIAGNOSIS — F60.3 BORDERLINE PERSONALITY DISORDER (HCC): ICD-10-CM

## 2024-11-07 DIAGNOSIS — F43.10 PTSD (POST-TRAUMATIC STRESS DISORDER): Primary | ICD-10-CM

## 2024-11-07 NOTE — PATIENT INSTRUCTIONS
Continue to reach out to [facility] for counseling and case management support, will talk with agency on Monday, 10/28   2. Continue to stay connected socially with others to reduce isolation   3. 4. Continue to maintain emotional boundaries with mother  5. Continue to maintain ending relationship with Max, keep emotional boundaries as much as possible even though his mother is helping with repair of father's car so that you have transportation    7. Continue to consult with Dr Link in regards to corrective vaginal surgery from prior cancer surgery  7. Go to ER if mood worsens between medical appointments or call [facility] Crisis team: [phone]   8. Return to clinic for Dr Reed on 11/21, telephone visit

## 2024-11-07 NOTE — PROGRESS NOTES
repair of father's car so that you have transportation    7. Continue to consult with Dr Link in regards to corrective vaginal surgery from prior cancer surgery  7. Go to ER if mood worsens between medical appointments or call  [facility] Crisis team: [phone]   8. Return to clinic for Dr Reed on 11/21, telephone visit

## 2024-11-21 ENCOUNTER — TELEMEDICINE (OUTPATIENT)
Dept: PSYCHOLOGY | Age: 44
End: 2024-11-21
Payer: MEDICARE

## 2024-11-21 DIAGNOSIS — F43.10 PTSD (POST-TRAUMATIC STRESS DISORDER): Primary | ICD-10-CM

## 2024-11-21 DIAGNOSIS — F60.3 BORDERLINE PERSONALITY DISORDER (HCC): ICD-10-CM

## 2024-11-21 PROCEDURE — 98968 PH1 ASSMT&MGMT NQHP 21-30: CPT | Performed by: PSYCHOLOGIST

## 2024-11-21 NOTE — PROGRESS NOTES
DDD (degenerative disc disease), lumbar     Depression     Headache(784.0)     Hearing loss 1988    Hearing aid used as child no longer useful due to degeneration    IBS (irritable bowel syndrome)     Multiple benign melanocytic nevi 04/10/2019    Neuropathy     Obesity     OCD (obsessive compulsive disorder)     PTSD (post-traumatic stress disorder)     Urinary incontinence     Vertigo      Problems with primary support group, Problems related to the social environment, Occupational problems, Economic problems, and Other psychosocial and environmental problems    Plan:  Pt interventions:    Practiced assertive communication, Trained in strategies for increasing balanced thinking, Provided education, Discussed benefits of referral for specialty care, and Supportive techniques    Pt Behavioral Change Plan:    Continue to reach out to [facility] for counseling and case management support,   Continue to stay connected socially with others to reduce isolation   3. Continue to maintain emotional boundaries with mother  4. Continue to maintain ending relationship with Max, keep emotional boundaries as much as possible even though his mother is helping with repair of father's car so that you have transportation    5. Continue to consult with Dr Link in regards to corrective vaginal surgery from prior cancer surgery  6. Go to ER if mood worsens between medical appointments or call [facility] Crisis team: [phone]   7. Return to clinic for Dr Reed in 2 weeks, telephone visit

## 2024-11-21 NOTE — PATIENT INSTRUCTIONS
Continue to reach out to [facility] for counseling and case management support,   Continue to stay connected socially with others to reduce isolation   3. Continue to maintain emotional boundaries with mother  4. Continue to maintain ending relationship with Max, keep emotional boundaries as much as possible even though his mother is helping with repair of father's car so that you have transportation    5. Continue to consult with Dr Link in regards to corrective vaginal surgery from prior cancer surgery  6. Go to ER if mood worsens between medical appointments or call [facility] Crisis team: [phone]   7. Return to clinic for Dr Reed in 2 weeks, telephone visit

## 2024-12-05 ENCOUNTER — TELEMEDICINE (OUTPATIENT)
Dept: PSYCHOLOGY | Age: 44
End: 2024-12-05
Payer: MEDICARE

## 2024-12-05 DIAGNOSIS — F60.3 BORDERLINE PERSONALITY DISORDER (HCC): ICD-10-CM

## 2024-12-05 DIAGNOSIS — F43.10 PTSD (POST-TRAUMATIC STRESS DISORDER): Primary | ICD-10-CM

## 2024-12-05 PROCEDURE — 98968 PH1 ASSMT&MGMT NQHP 21-30: CPT | Performed by: PSYCHOLOGIST

## 2024-12-05 NOTE — PROGRESS NOTES
Social History:   Social History     Socioeconomic History    Marital status: Single     Spouse name: Not on file    Number of children: 0    Years of education: 16    Highest education level: Bachelor's degree (e.g., BA, AB, BS)   Occupational History    Not on file   Tobacco Use    Smoking status: Never    Smokeless tobacco: Never    Tobacco comments:     Heavy Passive exposure in childhood   Vaping Use    Vaping status: Never Used   Substance and Sexual Activity    Alcohol use: Yes     Comment: occasional.  Drank heavily when she was younger    Drug use: Yes     Types: Marijuana (Weed)     Comment: Edibles and THC pen    Sexual activity: Not Currently     Partners: Male   Other Topics Concern    Not on file   Social History Narrative    Patient does hold a degree in psychology.  She has been with the current person that she is with for approximately 20 years, no unstable housing at this time but does have a history of homelessness.  Patient has no children and is not currently working.        No guns at home, no  service for the patient.  Patient has been in a legal luciano with her aunt over monies her father was supposed to left her, with this luciano having lasted approximately 4 years.        Lives with mom and feels safe      Social Determinants of Health     Financial Resource Strain: Low Risk  (9/28/2023)    Overall Financial Resource Strain (CARDIA)     Difficulty of Paying Living Expenses: Not hard at all   Food Insecurity: Not on file (9/28/2023)   Transportation Needs: Unknown (9/28/2023)    PRAPARE - Transportation     Lack of Transportation (Medical): Not on file     Lack of Transportation (Non-Medical): No   Physical Activity: Not on file   Stress: Not on file   Social Connections: Not on file   Intimate Partner Violence: Not on file   Housing Stability: Unknown (9/28/2023)    Housing Stability Vital Sign     Unable to Pay for Housing in the Last Year: Not on file     Number of Places

## 2024-12-05 NOTE — PATIENT INSTRUCTIONS
Continue to reach out to [facility] for counseling and case management support,   Continue to stay connected socially with others to reduce isolation   3. Continue to maintain emotional boundaries with mother  4. Continue to maintain ending relationship with Max, keep emotional boundaries as much as possible   5. Continue to consult with Dr Link in regards to corrective vaginal surgery from prior cancer surgery  6. Go to ER if mood worsens between medical appointments or call [facility] Crisis team: [phone]   7. Return to clinic for Dr Reed in 2 weeks, telephone visit

## 2024-12-19 ENCOUNTER — TELEMEDICINE (OUTPATIENT)
Dept: PSYCHOLOGY | Age: 44
End: 2024-12-19

## 2024-12-19 DIAGNOSIS — F43.10 PTSD (POST-TRAUMATIC STRESS DISORDER): Primary | ICD-10-CM

## 2024-12-19 DIAGNOSIS — J45.40 MODERATE PERSISTENT ASTHMA WITHOUT COMPLICATION: ICD-10-CM

## 2024-12-19 DIAGNOSIS — F60.3 BORDERLINE PERSONALITY DISORDER (HCC): ICD-10-CM

## 2024-12-19 RX ORDER — ALBUTEROL SULFATE 90 UG/1
INHALANT RESPIRATORY (INHALATION)
Qty: 18 G | Refills: 5 | OUTPATIENT
Start: 2024-12-19

## 2024-12-19 NOTE — PATIENT INSTRUCTIONS
Continue to reach out to [facility] for counseling and case management support,   Continue to stay connected socially with others to reduce isolation   3. Continue to maintain emotional boundaries with mother  4. Continue to maintain ending relationship with Max, keep emotional boundaries as much as possible   5. Continue to consult with Dr Link in regards to corrective vaginal surgery from prior cancer surgery  6. Go to ER if mood worsens between medical appointments or call [facility] Crisis team: [phone]   7. Return to clinic for Dr Reed in 3 weeks, telephone visit

## 2024-12-19 NOTE — PROGRESS NOTES
Behavioral Health Consultation Follow-up  Theresa Reed PsyD  Psychologist  12/19/2024  11:36 AM    NOTE - this visit conducted via audio means : MyChart, Doxy, etc, in accordance with CMS guidelines. The patient (or guardian if applicable) is aware that this is a billable service, which includes applicable co-pays. This Virtual Visit was conducted with patient's (and/or legal guardian's) consent.  Telemedicine APA guidelines were reviewed and discussed. The patient was located in a state where the provider was licensed to provide care.     Location of provider: [place]  Location of patient: home     Time spent with Patient: 37 minutes  This is patient's  [address]    [facility] appointment.    Reason for Consult:  PTSD  Referring Provider: Theresa Reed PSYD  [address] [place] [address]    Feedback given to PCP.    S:    Last appt: 12/5. Met someone, dating new man Chong. Mother and Max \"to let me\", distant connect to ex Max.  Applied for mortgage, 50K to pay out Max and pay credit card debt but was declined.   Theme: false advertising but feels she haslearned so much about \"myself\" by connecting with Florentino the last year. Bittersweet in that she is really wanting a marriage.     Medical worry: found out silvino perez, can happen when one changes partners. Pelvic exercises to strengthen trauma to vagina due to hysterectomy surgery years ago. Relief that it was this versus her fear of thinking cancer had returned. Good news: working closely with OB/GYN Dr Link and now has new PCP Teresa in the [facility] system as well. Continues to work on finding  specialist.     Complication with new man: connected to Chong at holiday even locally. Lived in Chong's father's house, [place] for \"over a decade\". Eric is Chong's cousin, lives in [place], , 3 children. His ex wife had alcohol problem, raised on his own, last child now graduating HS.     School: if get scholarship, pt would go. Has to wait and

## 2025-01-09 ENCOUNTER — TELEMEDICINE (OUTPATIENT)
Dept: PSYCHOLOGY | Age: 45
End: 2025-01-09

## 2025-01-09 DIAGNOSIS — F60.3 BORDERLINE PERSONALITY DISORDER (HCC): ICD-10-CM

## 2025-01-09 DIAGNOSIS — F43.10 PTSD (POST-TRAUMATIC STRESS DISORDER): Primary | ICD-10-CM

## 2025-01-09 NOTE — PROGRESS NOTES
20 years, no unstable housing at this time but does have a history of homelessness.  Patient has no children and is not currently working.        No guns at home, no  service for the patient.  Patient has been in a legal luciano with her aunt over monies her father was supposed to left her, with this luciano having lasted approximately 4 years.        Lives with mom and feels safe      Social Determinants of Health     Financial Resource Strain: Low Risk  (2023)    Overall Financial Resource Strain (CARDIA)     Difficulty of Paying Living Expenses: Not hard at all   Food Insecurity: Not on file (2023)   Transportation Needs: Unknown (2023)    PRAPARE - Transportation     Lack of Transportation (Medical): Not on file     Lack of Transportation (Non-Medical): No   Physical Activity: Not on file   Stress: Not on file   Social Connections: Not on file   Intimate Partner Violence: Not on file   Housing Stability: Unknown (2023)    Housing Stability Vital Sign     Unable to Pay for Housing in the Last Year: Not on file     Number of Places Lived in the Last Year: Not on file     Unstable Housing in the Last Year: No       TOBACCO:   reports that she has never smoked. She has never used smokeless tobacco.  ETOH:   reports current alcohol use.    Family History:   Family History   Problem Relation Age of Onset    Other Mother         autoimmune dz    Arthritis Mother         [place]    Osteoarthritis Mother     Other Father         cirrhosis    Substance Abuse Father     Cancer Father         Skin cancer    Alcohol Abuse Father         Cirrhosis of liver    Arthritis Father     Diabetes Father     Early Death Father     High Cholesterol Father     Obesity Father     Breast Cancer Maternal Grandmother          due to    Allergy (Severe) Maternal Grandmother         Valium    Arthritis Maternal Grandmother         Rheumatoid amd Oesteo    High Blood Pressure Maternal Grandmother

## 2025-01-22 ENCOUNTER — TELEMEDICINE (OUTPATIENT)
Dept: PSYCHOLOGY | Age: 45
End: 2025-01-22

## 2025-01-22 DIAGNOSIS — F43.10 PTSD (POST-TRAUMATIC STRESS DISORDER): Primary | ICD-10-CM

## 2025-01-22 DIAGNOSIS — F60.3 BORDERLINE PERSONALITY DISORDER (HCC): ICD-10-CM

## 2025-01-22 NOTE — PROGRESS NOTES
Behavioral Health Consultation Follow-up  Theresa Reed PsyD  Psychologist  1/22/2025  2:39 PM      NOTE - this visit conducted via audio means : MyChart, Doxy, etc, in accordance with CMS guidelines. The patient (or guardian if applicable) is aware that this is a billable service, which includes applicable co-pays. This Virtual Visit was conducted with patient's (and/or legal guardian's) consent.  Telemedicine APA guidelines were reviewed and discussed. The patient was located in a state where the provider was licensed to provide care.     [place] of provider: [facility]  Location of patient: home   Time spent with Patient: 40 minutes  This is patient's  [address]    [facility] appointment.    Reason for Consult:  PTSD  Referring Provider: LUCAS Raymond MD    Feedback given to PCP.    S:    Last appt: 1/9. Telephone visit with pt (and mother). Challenging due to time limits so much of our time focused on psycho-ed for mother about PTSD and treatment needed. This sparked deeper discussion about need for \"communication help\" between pt and mother. Considered benefits of family therapy. Insurance and cost considerable barrier to treatment. Mother has medicare, she will find out if family therapy treatment covered. Pt working on her own. Will reach out to colleagues to see if medicare covered family therapist is an option. Agreed f/u next week needed to discuss more prep and readiness for this treatment at some point.     O:  MSE:    Appetite normal  Sleep disturbance Yes  Fatigue Yes  Loss of pleasure Yes  Impulsive behavior No  Speech    normal rate, normal volume, and well articulated  Mood    stable, but stressed understandably about finances and family relationship stress   Thought Content    intact  Thought Process    linear, goal directed, and coherent  Associations    logical connections  Insight    Good  Judgment    Intact  Orientation    oriented to person, place, time, and general circumstances  Memory

## 2025-01-23 NOTE — PATIENT INSTRUCTIONS
Mother will find out if Medicare will cover family therapy, will discuss options next week  Brunilda will find out if family therapy covered by her insurance  Continue to reach out to [facility] for counseling and case management support  Continue to stay connected socially with others to reduce isolation   Continue to maintain emotional boundaries with mother  Continue to maintain ending relationship with Max, keep emotional boundaries as much as possible   Continue to consult with Dr Link in regards to corrective vaginal surgery from prior cancer surgery  Go to ER if mood worsens between medical appointments or call [facility] Crisis team: [phone]   Return to clinic for Dr Reed in 1 week, telephone visit with mother

## 2025-01-29 ENCOUNTER — TELEMEDICINE (OUTPATIENT)
Dept: PSYCHOLOGY | Age: 45
End: 2025-01-29

## 2025-01-29 DIAGNOSIS — F43.10 PTSD (POST-TRAUMATIC STRESS DISORDER): Primary | ICD-10-CM

## 2025-01-29 DIAGNOSIS — F60.3 BORDERLINE PERSONALITY DISORDER (HCC): ICD-10-CM

## 2025-01-29 NOTE — PATIENT INSTRUCTIONS
Dr Reed will find out what psychologist options are available for telemedicine in mother's state. Will send referrals via my chart to patient   Brunilda found out her insurance may be family therapy option but going to focus on POTS concern. New PCP stated there may be specialist in [facility].

## 2025-01-29 NOTE — PROGRESS NOTES
3/21/2024     5:46 PM 2/20/2024    10:56 AM 2/15/2024    12:54 PM 12/21/2023     3:22 PM   PHQ Scores   PHQ2 Score 2 4 5 3 2 3 4   PHQ9 Score 11 17 16 13 6 15 18     Interpretation of Total Score Depression Severity: 1-4 = Minimal depression, 5-9 = Mild depression, 10-14 = Moderate depression, 15-19 = Moderately severe depression, 20-27 = Severe depression     Safety: Denied any si/hi risk, intent, or plan      Diagnosis:    PTSD, BPD      Diagnosis Date    ADHD (attention deficit hyperactivity disorder)     Asthma     Back pain     Bilateral ovarian cysts     Bipolar 1 disorder (HCC)     Cancer (HCC)     adenoca in situ cervix    Chronic back pain     DDD (degenerative disc disease), lumbar     Depression     Headache(784.0)     Hearing loss 1988    Hearing aid used as child no longer useful due to degeneration    IBS (irritable bowel syndrome)     Multiple benign melanocytic nevi 04/10/2019    Neuropathy     Obesity     OCD (obsessive compulsive disorder)     PTSD (post-traumatic stress disorder)     Urinary incontinence     Vertigo      Problems with primary support group, Problems related to the social environment, Housing problems, Economic problems, and Other psychosocial and environmental problems    Plan:  Pt interventions:    Practiced assertive communication, Trained in strategies for increasing balanced thinking, Discussed self-care (sleep, nutrition, rewarding activities, social support, exercise), Discussed benefits of referral for specialty care, Provided education on PTSD symptoms and treatment options for evidence-based treatment (Cognitive Processing Therapy and Prolonged Exposure), Motivational Interviewing to increase patient confidence and compliance with adhering to behavioral change plan, and Supportive techniques    Pt Behavioral Change Plan:    Dr Reed will find out what psychologist options are available for telemedicine in [facility]. Will send referrals via my chart to patient

## 2025-02-12 ENCOUNTER — PATIENT MESSAGE (OUTPATIENT)
Dept: PRIMARY CARE CLINIC | Age: 45
End: 2025-02-12

## 2025-02-19 ENCOUNTER — TELEMEDICINE (OUTPATIENT)
Dept: PSYCHOLOGY | Age: 45
End: 2025-02-19

## 2025-02-19 DIAGNOSIS — F43.10 PTSD (POST-TRAUMATIC STRESS DISORDER): Primary | ICD-10-CM

## 2025-02-19 DIAGNOSIS — F60.3 BORDERLINE PERSONALITY DISORDER (HCC): ICD-10-CM

## 2025-02-19 NOTE — PROGRESS NOTES
Grandmother         Rheumatoid amd Oesteo    High Blood Pressure Maternal Grandmother     Miscarriages / Stillbirths Maternal Grandmother     Obesity Maternal Grandmother     Osteoarthritis Maternal Grandmother     Rheum Arthritis Maternal Grandmother     Suicide Other         Uncle killed himself when patient age 5    Suicide Cousin         Cousin killed himself in 2017    Mental Illness Maternal Grandfather         Parkinson's Disease    Alcohol Abuse Paternal Grandfather         Cirrhosis of liver    Mental Illness Paternal Grandfather         Schizophrenia    Substance Abuse Paternal Grandfather     Alcohol Abuse Paternal Grandmother     Substance Abuse Paternal Grandmother     Substance Abuse Paternal Aunt          A:    See S: above        8/14/2024     4:00 PM 7/25/2024     2:21 PM 4/24/2024     5:06 PM 3/21/2024     5:46 PM 2/20/2024    10:56 AM 2/15/2024    12:54 PM 12/21/2023     3:22 PM   PHQ Scores   PHQ2 Score 2 4 5 3 2 3 4   PHQ9 Score 11 17 16 13 6 15 18     Interpretation of Total Score Depression Severity: 1-4 = Minimal depression, 5-9 = Mild depression, 10-14 = Moderate depression, 15-19 = Moderately severe depression, 20-27 = Severe depression     Safety: Denied any si/hi risk, intent, or plan.      Diagnosis:    PTSD, BPD      Diagnosis Date    ADHD (attention deficit hyperactivity disorder)     Asthma     Back pain     Bilateral ovarian cysts     Bipolar 1 disorder (HCC)     Cancer (HCC)     adenoca in situ cervix    Chronic back pain     DDD (degenerative disc disease), lumbar     Depression     Headache(784.0)     Hearing loss 1988    Hearing aid used as child no longer useful due to degeneration    IBS (irritable bowel syndrome)     Multiple benign melanocytic nevi 04/10/2019    Neuropathy     Obesity     OCD (obsessive compulsive disorder)     PTSD (post-traumatic stress disorder)     Urinary incontinence     Vertigo      Problems with primary support group, Problems related to the social

## 2025-02-19 NOTE — PATIENT INSTRUCTIONS
Will send list of psychotherapy specialists provider by her new PCP for Dr Reed to review via my chart   Continue to stay connected socially with others to reduce isolation   Continue to maintain emotional boundaries with mother  Continue to maintain ending relationship with Max, keep emotional boundaries as much as possible   Continue to consult with Dr Link in regards to corrective vaginal surgery from prior cancer surgery  Go to ER if mood worsens between medical appointments or call [facility] Crisis team: [phone]   Return to clinic for Dr Reed in 1 month, telephone visit individually

## 2025-03-06 ENCOUNTER — SCHEDULED TELEPHONE ENCOUNTER (OUTPATIENT)
Dept: PSYCHOLOGY | Age: 45
End: 2025-03-06

## 2025-03-06 DIAGNOSIS — F43.10 PTSD (POST-TRAUMATIC STRESS DISORDER): Primary | ICD-10-CM

## 2025-03-06 NOTE — PROGRESS NOTES
Not on file   Social History Narrative    Patient does hold a degree in psychology.  She has been with the current person that she is with for approximately 20 years, no unstable housing at this time but does have a history of homelessness.  Patient has no children and is not currently working.        No guns at home, no  service for the patient.  Patient has been in a legal luciano with her aunt over mon her father was supposed to left her, with this luciano having lasted approximately 4 years.        Lives with mom and feels safe      Social Determinants of Health     Financial Resource Strain: Low Risk  (2023)    Overall Financial Resource Strain (CARDIA)     Difficulty of Paying Living Expenses: Not hard at all   Food Insecurity: Not on file (2023)   Transportation Needs: Unknown (2023)    PRAPARE - Transportation     Lack of Transportation (Medical): Not on file     Lack of Transportation (Non-Medical): No   Physical Activity: Not on file   Stress: Not on file   Social Connections: Not on file   Intimate Partner Violence: Not on file   Housing Stability: Unknown (2023)    Housing Stability Vital Sign     Unable to Pay for Housing in the Last Year: Not on file     Number of Places Lived in the Last Year: Not on file     Unstable Housing in the Last Year: No       TOBACCO:   reports that she has never smoked. She has never used smokeless tobacco.  ETOH:   reports current alcohol use.    Family History:   Family History   Problem Relation Age of Onset    Other Mother         autoimmune dz    Arthritis Mother         Oesteo    Osteoarthritis Mother     Other Father         cirrhosis    Substance Abuse Father     Cancer Father         Skin cancer    Alcohol Abuse Father         Cirrhosis of liver    Arthritis Father     Diabetes Father     Early Death Father     High Cholesterol Father     Obesity Father     Breast Cancer Maternal Grandmother          due to    Allergy

## 2025-03-06 NOTE — PATIENT INSTRUCTIONS
Will review list of psychotherapy specialists provider by her new PCP next appointment   Continue to stay connected socially with others to reduce isolation   Continue to maintain emotional boundaries with mother  Continue to maintain ending relationship with Max, keep emotional boundaries as much as possible   Continue to consult with Dr Link in regards to corrective vaginal surgery from prior cancer surgery  Go to ER if mood worsens between medical appointments or call [facility] Crisis team: [phone]   Return to clinic for Dr Reed in 1 week, telephone visit individually

## 2025-03-12 ENCOUNTER — TELEMEDICINE (OUTPATIENT)
Dept: PSYCHOLOGY | Age: 45
End: 2025-03-12

## 2025-03-12 DIAGNOSIS — F43.10 PTSD (POST-TRAUMATIC STRESS DISORDER): Primary | ICD-10-CM

## 2025-03-12 DIAGNOSIS — F60.3 BORDERLINE PERSONALITY DISORDER (HCC): ICD-10-CM

## 2025-03-12 NOTE — PROGRESS NOTES
Behavioral Health Consultation Follow-up  Theresa Reed PsyD  Psychologist  3/12/2025  1:02 PM    NOTE - this visit conducted via audio means : MyChart, Doxy, etc, in accordance with CMS guidelines. The patient (or guardian if applicable) is aware that this is a billable service, which includes applicable co-pays. This Virtual Visit was conducted with patient's (and/or legal guardian's) consent.  Telemedicine APA guidelines were reviewed and discussed. The patient was located in a state where the provider was licensed to provide care.     [place]  Location of patient: home     Time spent with Patient: 35 minutes  This is patient's  47 th   [facility] appointment.    Reason for Consult:  PTSD  Referring Provider: Elizabeth Raymond DO  [address] [place] [address]    Feedback given to PCP.    S:    Last appt: 3/6. More MI around readiness for engaging in treatment with new psychotherapy specialist. Provided referral list by new PCP. Reviewed today. Pt will call to see if any of these are viable option [see below]    1) Nancy Nolen [facility]-S:  [email or web address],   [phone] - does INTENSIVES        2) Hawa Lewis [facility],NCC,GLCMA, R-DMT, R-MPA - Movement therapists, EMDR certified, [employer] [phone]     3) [email or web address] both therapy and case management     4) [email or web address]  [place]: [phone]   AMMY Sinclair excellent       O:  MSE:    Appetite abnormal: poor  Sleep disturbance Yes  Fatigue Yes  Loss of pleasure Yes  Impulsive behavior No  Speech    normal rate, normal volume, and well articulated  Mood    stable, managing   Thought Content    intact  Thought Process    linear, goal directed, and coherent  Associations    logical connections  Insight    Good  Judgment    Intact  Orientation    oriented to person, place, time, and general circumstances  Memory    recent and remote memory

## 2025-03-12 NOTE — PATIENT INSTRUCTIONS
Call referral options above to find out of option  Continue to stay connected socially with others to reduce isolation   Continue to maintain emotional boundaries with mother  Continue to maintain ending relationship with Max, keep emotional boundaries as much as possible   Continue to consult with Dr Link in regards to corrective vaginal surgery from prior cancer surgery  Go to ER if mood worsens between medical appointments or call  Mobile Crisis team: [phone]   Return to clinic for Dr Reed in 3 weeks, telephone visit individually

## 2025-04-17 ENCOUNTER — SCHEDULED TELEPHONE ENCOUNTER (OUTPATIENT)
Dept: PSYCHOLOGY | Age: 45
End: 2025-04-17

## 2025-04-17 DIAGNOSIS — F43.10 PTSD (POST-TRAUMATIC STRESS DISORDER): Primary | ICD-10-CM

## 2025-04-17 DIAGNOSIS — F60.3 BORDERLINE PERSONALITY DISORDER (HCC): ICD-10-CM

## 2025-04-17 ASSESSMENT — PATIENT HEALTH QUESTIONNAIRE - PHQ9
SUM OF ALL RESPONSES TO PHQ QUESTIONS 1-9: 2
1. LITTLE INTEREST OR PLEASURE IN DOING THINGS: SEVERAL DAYS
SUM OF ALL RESPONSES TO PHQ QUESTIONS 1-9: 2
2. FEELING DOWN, DEPRESSED OR HOPELESS: SEVERAL DAYS
SUM OF ALL RESPONSES TO PHQ QUESTIONS 1-9: 2
SUM OF ALL RESPONSES TO PHQ QUESTIONS 1-9: 2

## 2025-04-17 NOTE — PATIENT INSTRUCTIONS
Call referral options above to find out options  Continue to stay connected socially with others to reduce isolation   Continue to maintain emotional boundaries with mother  Continue to maintain ending relationship with Max, keep emotional boundaries as much as possible   Continue to consult with Dr Link in regards to corrective vaginal surgery from prior cancer surgery  Go to ER if mood worsens between medical appointments or call  Mobile Crisis team: [phone]   Continue to pelvic floor treatment   Return to clinic for Dr Reed in 2 weeks, telephone visit individually. Aware this is last consult. Aware provider is leaving healthcare system on 5/22/25.

## 2025-04-17 NOTE — PROGRESS NOTES
Behavioral Health Consultation Follow-up  Theresa Reed PsyD  Psychologist  4/17/2025  1:04 PM    NOTE - this visit conducted via audio means : MyChart, Doxy, etc, in accordance with CMS guidelines. The patient (or guardian if applicable) is aware that this is a billable service, which includes applicable co-pays. This Virtual Visit was conducted with patient's (and/or legal guardian's) consent.  Telemedicine APA guidelines were reviewed and discussed. The patient was located in a state where the provider was licensed to provide care.     [place] of [place]: [place]  Location of patient: home     Time spent with Patient: 35 minutes  This is patient's  48 th   [facility] appointment.    Reason for Consult:  PTSD  Referring Provider: Elizabeth Raymond DO  [address] [place] [address]    Feedback given to PCP.    S:    Last appt: 3/12. Still pursuing somatoform treatment [see referrals below pt is working on]. Pelvic floor therapy very helpful. Pelvic wall therapy, less anxious about SSDI now \"I know what is wrong with me\". \"I'm healing generational trauma\" with this treatment. feels very supported by Dr Link OB/GYN and referred to PCP Teresa. New diagnosis of vaginismus and lower spinal cord area building increased confidence with understanding her medical issues more clearly. Still some question about POTS diagnosis. Has cardiology appt next Monday.     Insurance CM support: great news, has Wesley Clark, CNP [for 36 years]. Will go with pt to appts. Feeling \"listened to\". Amber \"going as my ears\".     Rest of appt focused on news that provider is leaving the healthcare system. Transition of care discussed. Has referrals and continues to look into them.     Nancy Nolen [facility]-S:  [email or web address],   [phone] - does INTENSIVES        2) Hawa Lewis [facility],NCC,GLCMA, R-DMT, R-MPA - Movement therapists, EMDR certified, [employer] [phone]      3)

## 2025-05-01 ENCOUNTER — SCHEDULED TELEPHONE ENCOUNTER (OUTPATIENT)
Dept: PSYCHOLOGY | Age: 45
End: 2025-05-01
Payer: MEDICARE

## 2025-05-01 DIAGNOSIS — F43.10 PTSD (POST-TRAUMATIC STRESS DISORDER): Primary | ICD-10-CM

## 2025-05-01 DIAGNOSIS — F60.3 BORDERLINE PERSONALITY DISORDER (HCC): ICD-10-CM

## 2025-05-01 PROCEDURE — 98968 PH1 ASSMT&MGMT NQHP 21-30: CPT | Performed by: PSYCHOLOGIST

## 2025-05-01 NOTE — PATIENT INSTRUCTIONS
1) Call referral for trauma informed psychotherapy options:    -Nancy Tamanna [facility]-S:  [email or web address],   [phone] - does MESHA     -Hawa Lewis [facility],NCC,GLCMA, R-DMT, R-MPA - Movement therapists, EMDR certified, [employer] [phone]   -[email or web address] both therapy and case management   -[email or web address]  [place]: [phone]   AMMY Sinclair   -[place] [employer] [facility]   [address] [address], [place], [address]  [email or web address]  [phone]     2. Continue to stay connected socially with others to reduce isolation   3. Continue to maintain emotional boundaries with mother  4. Continue to maintain ending relationship with Max, keep emotional boundaries as much as possible   5. Continue to consult with Dr Link in regards to corrective vaginal surgery from prior cancer surgery  6. Go to ER if mood worsens between medical appointments or call  Mobile Crisis team: [phone]   7. Continue to pelvic floor treatment   8. No further follow up with Dr Reed, aware this provider is leaving healthcare system on 5/22. Referrals provided above.

## 2025-05-01 NOTE — PROGRESS NOTES
Behavioral Health Consultation Follow-up  Theresa Reed PsyD  Psychologist  5/1/2025  3:15 PM    NOTE - this visit conducted via audio means : MyClydiat, Doxy, etc, in accordance with CMS guidelines. The patient (or guardian if applicable) is aware that this is a billable service, which includes applicable co-pays. This Virtual Visit was conducted with patient's (and/or legal guardian's) consent.  Telemedicine APA guidelines were reviewed and discussed. The patient was located in a state where the provider was licensed to provide care.     Location of provider: [place]  Location of patient: home     Time spent with Patient:40  minutes  This is patient's  49 th   [facility] appointment.    Reason for Consult:  PTSD  Referring Provider: Elizabeth Raymond DO  [address] [place] [address]    Feedback given to PCP.    S:    Last appt: 4/17.  Doing book group with ladies, different people but good. Contact with Varn last night. More processing of this provider leaving the health care on 5/22. Discussed transitions of care and treatment progress she has made with this provider.     O:  MSE:    Appetite normal  Sleep disturbance No  Fatigue Yes  Loss of pleasure Yes  Impulsive behavior No  Speech    normal rate, normal volume, and well articulated  Mood    stable, managing   Thought Content    intact  Thought Process    linear, goal directed, and coherent  Associations    logical connections  Insight    Good  Judgment    Intact  Orientation    oriented to person, place, time, and general circumstances  Memory    recent and remote memory intact  Attention/Concentration    intact  Morbid ideation No  Suicide Assessment    no suicidal ideation      History:    Medications:   Current Outpatient Medications   Medication Sig Dispense Refill    ondansetron (ZOFRAN) 4 MG tablet TAKE ONE TABLET BY MOUTH EVERY 8 HOURS AS NEEDED FOR NAUSEA AND VOMITING 30 tablet 3    albuterol (PROVENTIL) (2.5 MG/3ML) 0.083% nebulizer